# Patient Record
Sex: FEMALE | Race: WHITE | NOT HISPANIC OR LATINO | URBAN - METROPOLITAN AREA
[De-identification: names, ages, dates, MRNs, and addresses within clinical notes are randomized per-mention and may not be internally consistent; named-entity substitution may affect disease eponyms.]

---

## 2018-11-25 ENCOUNTER — INPATIENT (INPATIENT)
Facility: HOSPITAL | Age: 78
LOS: 8 days | Discharge: HOME | End: 2018-12-04
Attending: INTERNAL MEDICINE | Admitting: INTERNAL MEDICINE

## 2018-11-25 VITALS
SYSTOLIC BLOOD PRESSURE: 139 MMHG | TEMPERATURE: 97 F | RESPIRATION RATE: 18 BRPM | HEART RATE: 70 BPM | DIASTOLIC BLOOD PRESSURE: 63 MMHG | OXYGEN SATURATION: 88 %

## 2018-11-25 DIAGNOSIS — Z95.1 PRESENCE OF AORTOCORONARY BYPASS GRAFT: Chronic | ICD-10-CM

## 2018-11-25 DIAGNOSIS — Z98.890 OTHER SPECIFIED POSTPROCEDURAL STATES: Chronic | ICD-10-CM

## 2018-11-25 LAB
ALBUMIN SERPL ELPH-MCNC: 3.1 G/DL — LOW (ref 3.5–5.2)
ALP SERPL-CCNC: 178 U/L — HIGH (ref 30–115)
ALT FLD-CCNC: 35 U/L — SIGNIFICANT CHANGE UP (ref 0–41)
ANION GAP SERPL CALC-SCNC: 17 MMOL/L — HIGH (ref 7–14)
AST SERPL-CCNC: 39 U/L — SIGNIFICANT CHANGE UP (ref 0–41)
BASOPHILS # BLD AUTO: 0.02 K/UL — SIGNIFICANT CHANGE UP (ref 0–0.2)
BASOPHILS NFR BLD AUTO: 0.3 % — SIGNIFICANT CHANGE UP (ref 0–1)
BILIRUB SERPL-MCNC: 0.3 MG/DL — SIGNIFICANT CHANGE UP (ref 0.2–1.2)
BUN SERPL-MCNC: 80 MG/DL — CRITICAL HIGH (ref 10–20)
CALCIUM SERPL-MCNC: 9.5 MG/DL — SIGNIFICANT CHANGE UP (ref 8.5–10.1)
CHLORIDE SERPL-SCNC: 100 MMOL/L — SIGNIFICANT CHANGE UP (ref 98–110)
CO2 SERPL-SCNC: 24 MMOL/L — SIGNIFICANT CHANGE UP (ref 17–32)
CREAT SERPL-MCNC: 5.5 MG/DL — CRITICAL HIGH (ref 0.7–1.5)
EOSINOPHIL # BLD AUTO: 0.07 K/UL — SIGNIFICANT CHANGE UP (ref 0–0.7)
EOSINOPHIL NFR BLD AUTO: 0.9 % — SIGNIFICANT CHANGE UP (ref 0–8)
FLU A RESULT: NEGATIVE — SIGNIFICANT CHANGE UP
FLU A RESULT: NEGATIVE — SIGNIFICANT CHANGE UP
FLUAV AG NPH QL: NEGATIVE — SIGNIFICANT CHANGE UP
FLUBV AG NPH QL: NEGATIVE — SIGNIFICANT CHANGE UP
GAS PNL BLDA: SIGNIFICANT CHANGE UP
GAS PNL BLDA: SIGNIFICANT CHANGE UP
GAS PNL BLDV: SIGNIFICANT CHANGE UP
GLUCOSE BLDC GLUCOMTR-MCNC: 113 MG/DL — HIGH (ref 70–99)
GLUCOSE SERPL-MCNC: 93 MG/DL — SIGNIFICANT CHANGE UP (ref 70–99)
HCT VFR BLD CALC: 31.2 % — LOW (ref 37–47)
HGB BLD-MCNC: 9.3 G/DL — LOW (ref 12–16)
IMM GRANULOCYTES NFR BLD AUTO: 1.4 % — HIGH (ref 0.1–0.3)
LYMPHOCYTES # BLD AUTO: 0.58 K/UL — LOW (ref 1.2–3.4)
LYMPHOCYTES # BLD AUTO: 7.3 % — LOW (ref 20.5–51.1)
MCHC RBC-ENTMCNC: 26.5 PG — LOW (ref 27–31)
MCHC RBC-ENTMCNC: 29.8 G/DL — LOW (ref 32–37)
MCV RBC AUTO: 88.9 FL — SIGNIFICANT CHANGE UP (ref 81–99)
MONOCYTES # BLD AUTO: 0.91 K/UL — HIGH (ref 0.1–0.6)
MONOCYTES NFR BLD AUTO: 11.4 % — HIGH (ref 1.7–9.3)
NEUTROPHILS # BLD AUTO: 6.31 K/UL — SIGNIFICANT CHANGE UP (ref 1.4–6.5)
NEUTROPHILS NFR BLD AUTO: 78.7 % — HIGH (ref 42.2–75.2)
PLATELET # BLD AUTO: 120 K/UL — LOW (ref 130–400)
POTASSIUM SERPL-MCNC: 5 MMOL/L — SIGNIFICANT CHANGE UP (ref 3.5–5)
POTASSIUM SERPL-SCNC: 5 MMOL/L — SIGNIFICANT CHANGE UP (ref 3.5–5)
PROT SERPL-MCNC: 5.6 G/DL — LOW (ref 6–8)
RBC # BLD: 3.51 M/UL — LOW (ref 4.2–5.4)
RBC # FLD: 17.8 % — HIGH (ref 11.5–14.5)
RSV RESULT: NEGATIVE — SIGNIFICANT CHANGE UP
RSV RNA RESP QL NAA+PROBE: NEGATIVE — SIGNIFICANT CHANGE UP
SODIUM SERPL-SCNC: 141 MMOL/L — SIGNIFICANT CHANGE UP (ref 135–146)
WBC # BLD: 8 K/UL — SIGNIFICANT CHANGE UP (ref 4.8–10.8)
WBC # FLD AUTO: 8 K/UL — SIGNIFICANT CHANGE UP (ref 4.8–10.8)

## 2018-11-25 RX ORDER — LEVOTHYROXINE SODIUM 125 MCG
1 TABLET ORAL
Qty: 0 | Refills: 0 | COMMUNITY

## 2018-11-25 RX ORDER — CARVEDILOL PHOSPHATE 80 MG/1
1 CAPSULE, EXTENDED RELEASE ORAL
Qty: 0 | Refills: 0 | COMMUNITY

## 2018-11-25 RX ORDER — INSULIN GLARGINE 100 [IU]/ML
0 INJECTION, SOLUTION SUBCUTANEOUS
Qty: 0 | Refills: 0 | COMMUNITY

## 2018-11-25 RX ORDER — SERTRALINE 25 MG/1
50 TABLET, FILM COATED ORAL DAILY
Qty: 0 | Refills: 0 | Status: DISCONTINUED | OUTPATIENT
Start: 2018-11-25 | End: 2018-12-04

## 2018-11-25 RX ORDER — CALCITRIOL 0.5 UG/1
0.25 CAPSULE ORAL DAILY
Qty: 0 | Refills: 0 | Status: DISCONTINUED | OUTPATIENT
Start: 2018-11-25 | End: 2018-11-29

## 2018-11-25 RX ORDER — CARVEDILOL PHOSPHATE 80 MG/1
12.5 CAPSULE, EXTENDED RELEASE ORAL EVERY 12 HOURS
Qty: 0 | Refills: 0 | Status: DISCONTINUED | OUTPATIENT
Start: 2018-11-25 | End: 2018-12-04

## 2018-11-25 RX ORDER — INSULIN LISPRO 100/ML
5 VIAL (ML) SUBCUTANEOUS
Qty: 0 | Refills: 0 | Status: DISCONTINUED | OUTPATIENT
Start: 2018-11-25 | End: 2018-11-30

## 2018-11-25 RX ORDER — SERTRALINE 25 MG/1
1 TABLET, FILM COATED ORAL
Qty: 0 | Refills: 0 | COMMUNITY

## 2018-11-25 RX ORDER — CEFTRIAXONE 500 MG/1
1 INJECTION, POWDER, FOR SOLUTION INTRAMUSCULAR; INTRAVENOUS ONCE
Qty: 0 | Refills: 0 | Status: COMPLETED | OUTPATIENT
Start: 2018-11-25 | End: 2018-11-25

## 2018-11-25 RX ORDER — SITAGLIPTIN 50 MG/1
1 TABLET, FILM COATED ORAL
Qty: 0 | Refills: 0 | COMMUNITY

## 2018-11-25 RX ORDER — AZITHROMYCIN 500 MG/1
500 TABLET, FILM COATED ORAL EVERY 24 HOURS
Qty: 0 | Refills: 0 | Status: DISCONTINUED | OUTPATIENT
Start: 2018-11-25 | End: 2018-11-29

## 2018-11-25 RX ORDER — INSULIN GLARGINE 100 [IU]/ML
15 INJECTION, SOLUTION SUBCUTANEOUS AT BEDTIME
Qty: 0 | Refills: 0 | Status: DISCONTINUED | OUTPATIENT
Start: 2018-11-25 | End: 2018-11-30

## 2018-11-25 RX ORDER — CALCITRIOL 0.5 UG/1
1 CAPSULE ORAL
Qty: 0 | Refills: 0 | COMMUNITY

## 2018-11-25 RX ORDER — SODIUM CHLORIDE 9 MG/ML
1000 INJECTION, SOLUTION INTRAVENOUS ONCE
Qty: 0 | Refills: 0 | Status: COMPLETED | OUTPATIENT
Start: 2018-11-25 | End: 2018-11-25

## 2018-11-25 RX ORDER — DEXTROSE 50 % IN WATER 50 %
15 SYRINGE (ML) INTRAVENOUS ONCE
Qty: 0 | Refills: 0 | Status: DISCONTINUED | OUTPATIENT
Start: 2018-11-25 | End: 2018-12-04

## 2018-11-25 RX ORDER — GLUCAGON INJECTION, SOLUTION 0.5 MG/.1ML
1 INJECTION, SOLUTION SUBCUTANEOUS ONCE
Qty: 0 | Refills: 0 | Status: DISCONTINUED | OUTPATIENT
Start: 2018-11-25 | End: 2018-12-04

## 2018-11-25 RX ORDER — SODIUM CHLORIDE 9 MG/ML
1000 INJECTION INTRAMUSCULAR; INTRAVENOUS; SUBCUTANEOUS
Qty: 0 | Refills: 0 | Status: DISCONTINUED | OUTPATIENT
Start: 2018-11-25 | End: 2018-11-26

## 2018-11-25 RX ORDER — HEPARIN SODIUM 5000 [USP'U]/ML
5000 INJECTION INTRAVENOUS; SUBCUTANEOUS EVERY 8 HOURS
Qty: 0 | Refills: 0 | Status: DISCONTINUED | OUTPATIENT
Start: 2018-11-25 | End: 2018-12-04

## 2018-11-25 RX ORDER — CEFTRIAXONE 500 MG/1
1 INJECTION, POWDER, FOR SOLUTION INTRAMUSCULAR; INTRAVENOUS EVERY 24 HOURS
Qty: 0 | Refills: 0 | Status: DISCONTINUED | OUTPATIENT
Start: 2018-11-26 | End: 2018-11-27

## 2018-11-25 RX ORDER — DEXTROSE 50 % IN WATER 50 %
12.5 SYRINGE (ML) INTRAVENOUS ONCE
Qty: 0 | Refills: 0 | Status: DISCONTINUED | OUTPATIENT
Start: 2018-11-25 | End: 2018-12-04

## 2018-11-25 RX ORDER — ISOSORBIDE MONONITRATE 60 MG/1
1 TABLET, EXTENDED RELEASE ORAL
Qty: 0 | Refills: 0 | COMMUNITY

## 2018-11-25 RX ORDER — LEVOTHYROXINE SODIUM 125 MCG
50 TABLET ORAL DAILY
Qty: 0 | Refills: 0 | Status: DISCONTINUED | OUTPATIENT
Start: 2018-11-25 | End: 2018-12-04

## 2018-11-25 RX ORDER — PANTOPRAZOLE SODIUM 20 MG/1
40 TABLET, DELAYED RELEASE ORAL
Qty: 0 | Refills: 0 | Status: DISCONTINUED | OUTPATIENT
Start: 2018-11-25 | End: 2018-12-04

## 2018-11-25 RX ORDER — ISOSORBIDE MONONITRATE 60 MG/1
30 TABLET, EXTENDED RELEASE ORAL DAILY
Qty: 0 | Refills: 0 | Status: DISCONTINUED | OUTPATIENT
Start: 2018-11-25 | End: 2018-12-04

## 2018-11-25 RX ORDER — IPRATROPIUM/ALBUTEROL SULFATE 18-103MCG
3 AEROSOL WITH ADAPTER (GRAM) INHALATION
Qty: 0 | Refills: 0 | Status: COMPLETED | OUTPATIENT
Start: 2018-11-25 | End: 2018-11-25

## 2018-11-25 RX ORDER — INSULIN LISPRO 100/ML
VIAL (ML) SUBCUTANEOUS
Qty: 0 | Refills: 0 | Status: DISCONTINUED | OUTPATIENT
Start: 2018-11-25 | End: 2018-11-30

## 2018-11-25 RX ORDER — DEXTROSE 50 % IN WATER 50 %
25 SYRINGE (ML) INTRAVENOUS ONCE
Qty: 0 | Refills: 0 | Status: DISCONTINUED | OUTPATIENT
Start: 2018-11-25 | End: 2018-12-04

## 2018-11-25 RX ORDER — ASPIRIN/CALCIUM CARB/MAGNESIUM 324 MG
81 TABLET ORAL DAILY
Qty: 0 | Refills: 0 | Status: DISCONTINUED | OUTPATIENT
Start: 2018-11-25 | End: 2018-12-04

## 2018-11-25 RX ORDER — ASPIRIN/CALCIUM CARB/MAGNESIUM 324 MG
1 TABLET ORAL
Qty: 0 | Refills: 0 | COMMUNITY

## 2018-11-25 RX ORDER — BUDESONIDE AND FORMOTEROL FUMARATE DIHYDRATE 160; 4.5 UG/1; UG/1
2 AEROSOL RESPIRATORY (INHALATION)
Qty: 0 | Refills: 0 | COMMUNITY

## 2018-11-25 RX ORDER — FUROSEMIDE 40 MG
1 TABLET ORAL
Qty: 0 | Refills: 0 | COMMUNITY

## 2018-11-25 RX ORDER — SODIUM CHLORIDE 9 MG/ML
1000 INJECTION, SOLUTION INTRAVENOUS
Qty: 0 | Refills: 0 | Status: DISCONTINUED | OUTPATIENT
Start: 2018-11-25 | End: 2018-12-04

## 2018-11-25 RX ORDER — CEFTRIAXONE 500 MG/1
INJECTION, POWDER, FOR SOLUTION INTRAMUSCULAR; INTRAVENOUS
Qty: 0 | Refills: 0 | Status: DISCONTINUED | OUTPATIENT
Start: 2018-11-25 | End: 2018-11-27

## 2018-11-25 RX ADMIN — INSULIN GLARGINE 15 UNIT(S): 100 INJECTION, SOLUTION SUBCUTANEOUS at 23:34

## 2018-11-25 RX ADMIN — HEPARIN SODIUM 5000 UNIT(S): 5000 INJECTION INTRAVENOUS; SUBCUTANEOUS at 23:33

## 2018-11-25 RX ADMIN — CEFTRIAXONE 100 GRAM(S): 500 INJECTION, POWDER, FOR SOLUTION INTRAMUSCULAR; INTRAVENOUS at 16:00

## 2018-11-25 RX ADMIN — SODIUM CHLORIDE 50 MILLILITER(S): 9 INJECTION INTRAMUSCULAR; INTRAVENOUS; SUBCUTANEOUS at 22:28

## 2018-11-25 RX ADMIN — Medication 3 MILLILITER(S): at 16:02

## 2018-11-25 RX ADMIN — SODIUM CHLORIDE 1000 MILLILITER(S): 9 INJECTION, SOLUTION INTRAVENOUS at 16:02

## 2018-11-25 RX ADMIN — Medication 3 MILLILITER(S): at 16:00

## 2018-11-25 RX ADMIN — Medication 3 MILLILITER(S): at 16:01

## 2018-11-25 RX ADMIN — Medication 125 MILLIGRAM(S): at 16:02

## 2018-11-25 RX ADMIN — CEFTRIAXONE 100 GRAM(S): 500 INJECTION, POWDER, FOR SOLUTION INTRAMUSCULAR; INTRAVENOUS at 22:37

## 2018-11-25 RX ADMIN — CARVEDILOL PHOSPHATE 12.5 MILLIGRAM(S): 80 CAPSULE, EXTENDED RELEASE ORAL at 23:09

## 2018-11-25 NOTE — H&P ADULT - NSHPPHYSICALEXAM_GEN_ALL_CORE
Vital Signs Last 24 Hrs  T(C): 35.9 (25 Nov 2018 15:16), Max: 35.9 (25 Nov 2018 15:16)  T(F): 96.7 (25 Nov 2018 15:16), Max: 96.7 (25 Nov 2018 15:16)  HR: 63 (25 Nov 2018 16:16) (63 - 70)  BP: 139/63 (25 Nov 2018 15:16) (139/63 - 139/63)  BP(mean): --  RR: 18 (25 Nov 2018 15:16) (18 - 18)  SpO2: 99% (25 Nov 2018 16:16) (88% - 99%)    PHYSICAL EXAM:  GENERAL: AAOx3, no acute distress.  HEAD:  Atraumatic, Normocephalic  EYES: EOMI, PERRLA, conjunctiva and sclera clear  NECK: Supple, no JVD, cervical lymphadenopathy or thyromegaly  CHEST/LUNG: b/l diffuse rhonic and wheezing.  Pt on bipap.   HEART: Regular rate and rhythm; normal S1, S2, No murmurs, rubs, or gallops  ABDOMEN: Soft, nontender, nondistended; Bowel sounds present, no abdominal bruit, masses, or hepatosplenomegaly  EXTREMITIES: decreased pulses b/l - dorsalis pedis, tibial artery pulse.  Pale feet but warm to touch.    PSYCH:  Cooperative and appropriate  NEUROLOGY: No asterixis or tremor. Motor and sensory functions grossly intact  SKIN: Intact  Lymphatic system: No lymphadenopathy

## 2018-11-25 NOTE — H&P ADULT - ATTENDING COMMENTS
Agree with plan as above but may d/c rocephin. Patient still actively wheezing. Pt woken up for exam but deferred questioning to her granddaughter present at bedside. Granddaughter prefers her other family members be present when pt wakes up to discuss code status.     Labs, medications, images have been reviewed independently.

## 2018-11-25 NOTE — H&P ADULT - NSHPSOCIALHISTORY_GEN_ALL_CORE
Former smoker - quit 1 year ago when she was placed on home oxygen  -Pt smoked for 50 years  -Denies alcohol and illicit drug use.

## 2018-11-25 NOTE — ED PROVIDER NOTE - MEDICAL DECISION MAKING DETAILS
Patient to be admitted to an inpatient telemetry floor. Case discussed with and care endorsed to medical admitting resident. Admitting physician notified.

## 2018-11-25 NOTE — H&P ADULT - NSHPREVIEWOFSYSTEMS_GEN_ALL_CORE
CONSTITUTIONAL: No weakness, fevers or chills  EYES/ENT: No visual changes;  No vertigo or throat pain   NECK: No pain or stiffness  RESPIRATORY: SEE hpi  CARDIOVASCULAR: No chest pain or palpitations  GASTROINTESTINAL: No abdominal or epigastric pain. No nausea, vomiting, or hematemesis; No diarrhea or constipation. No melena or hematochezia.  GENITOURINARY: No dysuria, frequency or hematuria  NEUROLOGICAL: No numbness or weakness  SKIN: No itching, rashes

## 2018-11-25 NOTE — H&P ADULT - NSHPLABSRESULTS_GEN_ALL_CORE
9.3    8.00  )-----------( 120      ( 25 Nov 2018 16:00 )             31.2     11-25 @ 16:00  Auto Basophil #0.02 K/uL  Auto Basophil %0.3 %  Auto Eosinophil #0.07 K/uL  Auto Eosiniophil %0.9 %  Auto Immature Granulocyte %1.4 %<H>  Auto Lymphocyte #0.58 K/uL<L>  Auto Lymphocyte %7.3 %<L>  Auto Monocyte #0.91 K/uL<H>  Auto Monocyte %11.4 %<H>  Auto Neutrophil #6.31 K/uL  Auto Neutrophil %78.7 %<H>      141  |  100  |  80<HH>  ----------------------------<  93  11-25 @ 16:00  5.0   |  24  |  5.5<HH>        Ca    9.5     TPro  5.6<L>  /  Alb  3.1<L>  /  TBili  0.3  /  DBili  x   /  AST  39  /  ALT  35  /  AlkPhos  178<H>        CXR:  Right lower lobe infiltrate.  No PPM or AICD's.  Sternal clips visible.

## 2018-11-25 NOTE — ED PROVIDER NOTE - OBJECTIVE STATEMENT
78 yo female with PMHx COPD, HTN, HLD, DM, CKD presents for shortness of breath, congestion, productive cough x few days. Patient states she is home for the holidays and multiple sick contacts and congestion has been steadily getting worse. Patient is on Home O2 2L but states she is so congested she cannot breath from her nose. Patient feels very weak. Patient/family denies fevers, chest pain, abdominal pain, nausea, vomiting, diarrhea, leg pain/swelling, changes in PO intake, changes in urine output, changes in mental status.

## 2018-11-25 NOTE — H&P ADULT - HISTORY OF PRESENT ILLNESS
78 yo female with PMHx COPD on home oxygen, HTN, HLD, DM, history of bypass, CKD not on dialysis presents for shortness of breath, congestion, productive cough x 4 days. Patient states she is home for the holidays with multiple sick contacts at home and congestion has been steadily getting worse. Patient has 2L of home oxygen at home but states she is so congested she cannot breath from her nose. Patient feels tired and weak. Has a cough with productive sputum - greenish in color. Patient/family denies fevers, chest pain, abdominal pain, nausea, vomiting, diarrhea, leg pain/swelling, changes in PO intake, changes in urine output, changes in mental status.  Baseline, patient is able to walk but has to frequently stop due to severe PAD.  She reports her vascular surgeon says she's too high risk for surgery.      In ED, pt was found to be saturating at high 70's on 4L of NC and she was placed on bipap - saturating at 100%.

## 2018-11-25 NOTE — ED ADULT NURSE NOTE - NSIMPLEMENTINTERV_GEN_ALL_ED
Implemented All Fall with Harm Risk Interventions:  [place] to call system. Call bell, personal items and telephone within reach. Instruct patient to call for assistance. Room bathroom lighting operational. Non-slip footwear when patient is off stretcher. Physically safe environment: no spills, clutter or unnecessary equipment. Stretcher in lowest position, wheels locked, appropriate side rails in place. Provide visual cue, wrist band, yellow gown, etc. Monitor gait and stability. Monitor for mental status changes and reorient to person, place, and time. Review medications for side effects contributing to fall risk. Reinforce activity limits and safety measures with patient and family. Provide visual clues: red socks.

## 2018-11-25 NOTE — H&P ADULT - ASSESSMENT
78 yo female with PMHx COPD on home oxygen, HTN, HLD, DM, history of bypass, CKD not on dialysis presents for shortness of breath, congestion, productive cough x 4 days.    # COPD Exacerbation with bilateral lower lobe opacities  - pulmonary consult  - Rocephin 1 gram q 24, Azithromycin 500 q24  - nebulizers  - steroids 60 q 12  - flu and RVP panel sent by ED  - blood cultures    # HTN  -c/w home meds: carvedilol, enalapril, imdur    # DM  -monitor fingersticks  -ISS    # CAD s/p CABG  -c/w aspirin    # MARTIN on CKD  - Baseline creatinine unknown.  Please call Keysha Bruner in AM at [phone] to obtain baseline labs  - Nephro consult  - daily bmp's   - NS @ 50 cc/hr    #FENP:  Fluids: NS @ 50 cc/hr  Electrolytes: repeat potassium is 4.4  Nutrition: DASH/ TLC  Prophylaxis: Heparin and protonix  Activity: out of bed to chair + ambualte as tolerated    #Dispo: From home    #Code Status:  Pt and family want patient to be DNI but would like CPR performed.  It was explained that CPR stands for cardiopulmonary resuscitation and intubation is a part of CPR.  Family disagrees and re-iterate DNI with CPR.  MOLST form was brought to patient to sign DNI alone and family refused.  At this time, patient is full code. 76 yo female with PMHx COPD on home oxygen, HTN, HLD, DM, history of bypass, CKD not on dialysis presents for shortness of breath, congestion, productive cough x 4 days.    # COPD Exacerbation with bilateral lower lobe opacities  - pulmonary consult  - Rocephin 1 gram q 24, Azithromycin 500 q24  - nebulizers  - steroids 60 q 12  - flu and RVP panel sent by ED  - blood cultures    # HTN  -c/w home meds: carvedilol, imdur  -hold enalapril due to MARTIN    # DM  -monitor fingersticks  -ISS    # CAD s/p CABG  -c/w aspirin    # MARTIN on CKD  - Baseline creatinine unknown.  Please call Keysha Bruner in AM at [phone] to obtain baseline labs  - Nephro consult  - hold nephrotoxic agents  - daily bmp's   - NS @ 50 cc/hr    #FENP:  Fluids: NS @ 50 cc/hr  Electrolytes: repeat potassium is 4.4  Nutrition: DASH/ TLC  Prophylaxis: Heparin and protonix  Activity: out of bed to chair + ambulate as tolerated    #Dispo: From home    #Code Status:  Pt and family want patient to be DNI but would like CPR performed.  It was explained that CPR stands for cardiopulmonary resuscitation and intubation is a part of CPR.  Family disagrees and re-iterate DNI with CPR.  MOLST form was brought to patient to sign DNI alone and family refused.  At this time, patient is full code.

## 2018-11-25 NOTE — ED ADULT NURSE NOTE - OBJECTIVE STATEMENT
SOB and diff breathing for week, pt got sick with URI and breathing has progressively gottne worse, pt has copd on home o2 2lnc, hypoxic upon arrival. Pt placed on bipap, given neb tx , iv solumedrol and iv abx,

## 2018-11-25 NOTE — ED PROVIDER NOTE - PHYSICAL EXAMINATION
GEN: Tachypnic in respiratory distress. Appears tired.    HEAD:  Normocephalic, atraumatic.    EYES:  Clear conjunctivae without injection, drainage or discharge.    ENMT:  Dry MM.     NECK:  Supple, no masses. Normal ROM.    CARDIAC:  RRR, normal S1 and S2, no murmurs, rubs or gallops.    RESP:  Tachypnic to 30s in respiratory distress with retractions. B/L wheezing in all lung fields.    ABDOMEN:  Soft, non-tender, non-distended, no masses. Normal BS throughout.    MUSCULOSKELETAL: No leg swelling, no calf tenderness.   NEURO:  AAO x 3.     SKIN:  Normal skin color for age and race, well-perfused; warm and dry.

## 2018-11-25 NOTE — ED PROVIDER NOTE - ATTENDING CONTRIBUTION TO CARE
77 y F PMH COPD on 2L NC home O2, HTN, takes a water pill, HLD, DM, CKD, pw SOB and congestion worsening x 2 days. + greenish sputum. Feels chilled though family says this is baseline. No documented fevers, no chills, nausea, vomiting, chest pain, leg swelling, numbness, tingling, weakness. Low SpO2 on initial eval to high 70s.  Exam: NAD, Appears weak, appears stated age, NCAT, HEENT: mmm, EOMI, PERRLA, Neck: supple, nontender, nl ROM, Heart: RRR, no murmur, Lungs: tachypnea to 24, b/l diffuse wheezing, using accessory muscles for respiratory effort, no retractions or tripodding. no stridor, managing secretions, Abd: NTND, no guarding or rebound, no hernia palpated, no CVAT. MSK: chest, back, and ext nontender, nl rom, no deformity. No LE edema. normal 2+ b/l DP and radial pulses. Neuro: A&Ox3, normal strength, nl sensation throughout, speech normal though foreshortened sentences due to resp effort.   A/P: likely COPD exacerbation. Eval for PNA or other complication. BiPAP started on initial eval. Labs, imaging, symptom control, montior, reassess.

## 2018-11-25 NOTE — ED ADULT TRIAGE NOTE - CHIEF COMPLAINT QUOTE
pt complaining of cough congestion and shortness of breath. pt o2 dependant stating her nose is stuffed, difficulty breathing

## 2018-11-25 NOTE — ED PROVIDER NOTE - CARE PLAN
Principal Discharge DX:	Pneumonia  Secondary Diagnosis:	CO2 retention  Secondary Diagnosis:	COPD exacerbation  Secondary Diagnosis:	Shortness of breath  Secondary Diagnosis:	Hypoxia

## 2018-11-25 NOTE — H&P ADULT - PMH
Chronic kidney disease    COPD (chronic obstructive pulmonary disease)    Coronary artery disease    Diabetes mellitus    Dyslipidemia    Dyslipidemia    Hypertension

## 2018-11-26 LAB
ALBUMIN SERPL ELPH-MCNC: 2.7 G/DL — LOW (ref 3.5–5.2)
ALP SERPL-CCNC: 174 U/L — HIGH (ref 30–115)
ALT FLD-CCNC: 32 U/L — SIGNIFICANT CHANGE UP (ref 0–41)
ANION GAP SERPL CALC-SCNC: 17 MMOL/L — HIGH (ref 7–14)
ANION GAP SERPL CALC-SCNC: 17 MMOL/L — HIGH (ref 7–14)
AST SERPL-CCNC: 30 U/L — SIGNIFICANT CHANGE UP (ref 0–41)
BASOPHILS # BLD AUTO: 0.01 K/UL — SIGNIFICANT CHANGE UP (ref 0–0.2)
BASOPHILS NFR BLD AUTO: 0.2 % — SIGNIFICANT CHANGE UP (ref 0–1)
BILIRUB DIRECT SERPL-MCNC: <0.2 MG/DL — SIGNIFICANT CHANGE UP (ref 0–0.2)
BILIRUB INDIRECT FLD-MCNC: >0 MG/DL — LOW (ref 0.2–1.2)
BILIRUB SERPL-MCNC: 0.2 MG/DL — SIGNIFICANT CHANGE UP (ref 0.2–1.2)
BUN SERPL-MCNC: 78 MG/DL — CRITICAL HIGH (ref 10–20)
BUN SERPL-MCNC: 81 MG/DL — CRITICAL HIGH (ref 10–20)
CALCIUM SERPL-MCNC: 8.8 MG/DL — SIGNIFICANT CHANGE UP (ref 8.5–10.1)
CALCIUM SERPL-MCNC: 8.9 MG/DL — SIGNIFICANT CHANGE UP (ref 8.5–10.1)
CHLORIDE SERPL-SCNC: 102 MMOL/L — SIGNIFICANT CHANGE UP (ref 98–110)
CHLORIDE SERPL-SCNC: 102 MMOL/L — SIGNIFICANT CHANGE UP (ref 98–110)
CO2 SERPL-SCNC: 20 MMOL/L — SIGNIFICANT CHANGE UP (ref 17–32)
CO2 SERPL-SCNC: 22 MMOL/L — SIGNIFICANT CHANGE UP (ref 17–32)
CREAT SERPL-MCNC: 4.9 MG/DL — CRITICAL HIGH (ref 0.7–1.5)
CREAT SERPL-MCNC: 5.3 MG/DL — CRITICAL HIGH (ref 0.7–1.5)
EOSINOPHIL # BLD AUTO: 0 K/UL — SIGNIFICANT CHANGE UP (ref 0–0.7)
EOSINOPHIL NFR BLD AUTO: 0 % — SIGNIFICANT CHANGE UP (ref 0–8)
ESTIMATED AVERAGE GLUCOSE: 120 MG/DL — HIGH (ref 68–114)
GLUCOSE BLDC GLUCOMTR-MCNC: 200 MG/DL — HIGH (ref 70–99)
GLUCOSE BLDC GLUCOMTR-MCNC: 236 MG/DL — HIGH (ref 70–99)
GLUCOSE BLDC GLUCOMTR-MCNC: 241 MG/DL — HIGH (ref 70–99)
GLUCOSE BLDC GLUCOMTR-MCNC: 244 MG/DL — HIGH (ref 70–99)
GLUCOSE BLDC GLUCOMTR-MCNC: 273 MG/DL — HIGH (ref 70–99)
GLUCOSE SERPL-MCNC: 153 MG/DL — HIGH (ref 70–99)
GLUCOSE SERPL-MCNC: 204 MG/DL — HIGH (ref 70–99)
HBA1C BLD-MCNC: 5.8 % — HIGH (ref 4–5.6)
HCT VFR BLD CALC: 29.5 % — LOW (ref 37–47)
HGB BLD-MCNC: 8.8 G/DL — LOW (ref 12–16)
IMM GRANULOCYTES NFR BLD AUTO: 2 % — HIGH (ref 0.1–0.3)
LYMPHOCYTES # BLD AUTO: 0.28 K/UL — LOW (ref 1.2–3.4)
LYMPHOCYTES # BLD AUTO: 5.2 % — LOW (ref 20.5–51.1)
MCHC RBC-ENTMCNC: 26.7 PG — LOW (ref 27–31)
MCHC RBC-ENTMCNC: 29.8 G/DL — LOW (ref 32–37)
MCV RBC AUTO: 89.4 FL — SIGNIFICANT CHANGE UP (ref 81–99)
MONOCYTES # BLD AUTO: 0.11 K/UL — SIGNIFICANT CHANGE UP (ref 0.1–0.6)
MONOCYTES NFR BLD AUTO: 2 % — SIGNIFICANT CHANGE UP (ref 1.7–9.3)
NEUTROPHILS # BLD AUTO: 4.87 K/UL — SIGNIFICANT CHANGE UP (ref 1.4–6.5)
NEUTROPHILS NFR BLD AUTO: 90.6 % — HIGH (ref 42.2–75.2)
NRBC # BLD: 0 /100 WBCS — SIGNIFICANT CHANGE UP (ref 0–0)
NT-PROBNP SERPL-SCNC: HIGH PG/ML (ref 0–300)
PLATELET # BLD AUTO: 122 K/UL — LOW (ref 130–400)
POTASSIUM SERPL-MCNC: 4.9 MMOL/L — SIGNIFICANT CHANGE UP (ref 3.5–5)
POTASSIUM SERPL-MCNC: 4.9 MMOL/L — SIGNIFICANT CHANGE UP (ref 3.5–5)
POTASSIUM SERPL-SCNC: 4.9 MMOL/L — SIGNIFICANT CHANGE UP (ref 3.5–5)
POTASSIUM SERPL-SCNC: 4.9 MMOL/L — SIGNIFICANT CHANGE UP (ref 3.5–5)
PROT SERPL-MCNC: 5.5 G/DL — LOW (ref 6–8)
RBC # BLD: 3.3 M/UL — LOW (ref 4.2–5.4)
RBC # FLD: 17.6 % — HIGH (ref 11.5–14.5)
SODIUM SERPL-SCNC: 139 MMOL/L — SIGNIFICANT CHANGE UP (ref 135–146)
SODIUM SERPL-SCNC: 141 MMOL/L — SIGNIFICANT CHANGE UP (ref 135–146)
WBC # BLD: 5.38 K/UL — SIGNIFICANT CHANGE UP (ref 4.8–10.8)
WBC # FLD AUTO: 5.38 K/UL — SIGNIFICANT CHANGE UP (ref 4.8–10.8)

## 2018-11-26 RX ORDER — ACETAMINOPHEN 500 MG
650 TABLET ORAL ONCE
Qty: 0 | Refills: 0 | Status: COMPLETED | OUTPATIENT
Start: 2018-11-26 | End: 2018-11-26

## 2018-11-26 RX ORDER — SODIUM CHLORIDE 9 MG/ML
1000 INJECTION INTRAMUSCULAR; INTRAVENOUS; SUBCUTANEOUS
Qty: 0 | Refills: 0 | Status: DISCONTINUED | OUTPATIENT
Start: 2018-11-26 | End: 2018-11-28

## 2018-11-26 RX ORDER — SODIUM CHLORIDE 9 MG/ML
500 INJECTION INTRAMUSCULAR; INTRAVENOUS; SUBCUTANEOUS ONCE
Qty: 0 | Refills: 0 | Status: COMPLETED | OUTPATIENT
Start: 2018-11-26 | End: 2018-11-26

## 2018-11-26 RX ORDER — ACETAMINOPHEN 500 MG
650 TABLET ORAL EVERY 6 HOURS
Qty: 0 | Refills: 0 | Status: DISCONTINUED | OUTPATIENT
Start: 2018-11-26 | End: 2018-12-04

## 2018-11-26 RX ORDER — ONDANSETRON 8 MG/1
4 TABLET, FILM COATED ORAL EVERY 6 HOURS
Qty: 0 | Refills: 0 | Status: DISCONTINUED | OUTPATIENT
Start: 2018-11-26 | End: 2018-12-04

## 2018-11-26 RX ORDER — SODIUM CHLORIDE 9 MG/ML
1000 INJECTION INTRAMUSCULAR; INTRAVENOUS; SUBCUTANEOUS
Qty: 0 | Refills: 0 | Status: DISCONTINUED | OUTPATIENT
Start: 2018-11-26 | End: 2018-11-26

## 2018-11-26 RX ADMIN — SODIUM CHLORIDE 50 MILLILITER(S): 9 INJECTION INTRAMUSCULAR; INTRAVENOUS; SUBCUTANEOUS at 13:50

## 2018-11-26 RX ADMIN — HEPARIN SODIUM 5000 UNIT(S): 5000 INJECTION INTRAVENOUS; SUBCUTANEOUS at 13:10

## 2018-11-26 RX ADMIN — PANTOPRAZOLE SODIUM 40 MILLIGRAM(S): 20 TABLET, DELAYED RELEASE ORAL at 08:14

## 2018-11-26 RX ADMIN — Medication 5 UNIT(S): at 08:14

## 2018-11-26 RX ADMIN — Medication 60 MILLIGRAM(S): at 17:16

## 2018-11-26 RX ADMIN — Medication 5 UNIT(S): at 13:21

## 2018-11-26 RX ADMIN — Medication 2: at 18:14

## 2018-11-26 RX ADMIN — CEFTRIAXONE 100 GRAM(S): 500 INJECTION, POWDER, FOR SOLUTION INTRAMUSCULAR; INTRAVENOUS at 22:22

## 2018-11-26 RX ADMIN — SERTRALINE 50 MILLIGRAM(S): 25 TABLET, FILM COATED ORAL at 11:15

## 2018-11-26 RX ADMIN — SODIUM CHLORIDE 50 MILLILITER(S): 9 INJECTION INTRAMUSCULAR; INTRAVENOUS; SUBCUTANEOUS at 00:34

## 2018-11-26 RX ADMIN — Medication 5 UNIT(S): at 18:15

## 2018-11-26 RX ADMIN — INSULIN GLARGINE 15 UNIT(S): 100 INJECTION, SOLUTION SUBCUTANEOUS at 23:02

## 2018-11-26 RX ADMIN — SODIUM CHLORIDE 500 MILLILITER(S): 9 INJECTION INTRAMUSCULAR; INTRAVENOUS; SUBCUTANEOUS at 12:01

## 2018-11-26 RX ADMIN — Medication 650 MILLIGRAM(S): at 01:02

## 2018-11-26 RX ADMIN — Medication 3: at 13:25

## 2018-11-26 RX ADMIN — Medication 60 MILLIGRAM(S): at 05:54

## 2018-11-26 RX ADMIN — SODIUM CHLORIDE 50 MILLILITER(S): 9 INJECTION INTRAMUSCULAR; INTRAVENOUS; SUBCUTANEOUS at 22:57

## 2018-11-26 RX ADMIN — Medication 81 MILLIGRAM(S): at 11:16

## 2018-11-26 RX ADMIN — ISOSORBIDE MONONITRATE 30 MILLIGRAM(S): 60 TABLET, EXTENDED RELEASE ORAL at 11:15

## 2018-11-26 RX ADMIN — CALCITRIOL 0.25 MICROGRAM(S): 0.5 CAPSULE ORAL at 11:16

## 2018-11-26 RX ADMIN — Medication 1: at 08:15

## 2018-11-26 RX ADMIN — Medication 50 MICROGRAM(S): at 05:54

## 2018-11-26 RX ADMIN — CARVEDILOL PHOSPHATE 12.5 MILLIGRAM(S): 80 CAPSULE, EXTENDED RELEASE ORAL at 17:16

## 2018-11-26 RX ADMIN — HEPARIN SODIUM 5000 UNIT(S): 5000 INJECTION INTRAVENOUS; SUBCUTANEOUS at 05:54

## 2018-11-26 RX ADMIN — AZITHROMYCIN 255 MILLIGRAM(S): 500 TABLET, FILM COATED ORAL at 00:34

## 2018-11-26 RX ADMIN — CARVEDILOL PHOSPHATE 12.5 MILLIGRAM(S): 80 CAPSULE, EXTENDED RELEASE ORAL at 05:58

## 2018-11-26 RX ADMIN — HEPARIN SODIUM 5000 UNIT(S): 5000 INJECTION INTRAVENOUS; SUBCUTANEOUS at 23:02

## 2018-11-26 RX ADMIN — Medication 650 MILLIGRAM(S): at 02:02

## 2018-11-26 NOTE — CONSULT NOTE ADULT - ASSESSMENT
IMPRESSION:  Acute COPD exacerbation - likely secondary to viral etiology    PLAN:    CNS:    HEENT: Oral care    PULMONARY:  HOB @ 45 degrees.  Nebs q4 and PRN  Continue solumedrol  Keep SpO2>92%  BiPAP at bedtime    CARDIOVASCULAR: I<O    GI: GI prophylaxis.  Feeding     RENAL:  Follow up lytes.  Correct as needed    INFECTIOUS DISEASE: Follow up cultures.  Continue IV abx  Check RVP    HEMATOLOGICAL:  DVT prophylaxis.    ENDOCRINE:  Follow up FS.  Insulin protocol if needed. IMPRESSION:    Acute COPD exacerbation - likely secondary to viral etiology  multiple co morbidities    PLAN:    CNS: avoid CNS depressant    HEENT: Oral care    PULMONARY:  HOB @ 45 degrees.  Nebs q4 and PRN  Continue solumedrol  Keep SpO2>92%  BiPAP at bedtime    CARDIOVASCULAR: I<O    GI: GI prophylaxis.  Feeding     RENAL:  Follow up lytes.  Correct as needed    INFECTIOUS DISEASE: Follow up cultures.  Continue IV abx  Check RVP    HEMATOLOGICAL:  DVT prophylaxis.    ENDOCRINE:  Follow up FS.  Insulin protocol if needed.    no need for MICU for now will follow

## 2018-11-26 NOTE — PROGRESS NOTE ADULT - ASSESSMENT
76 yo female with PMHx COPD on home oxygen, HTN, HLD, DM, history of bypass, CKD not on dialysis presents for shortness of breath, congestion, productive cough x 4 days.    COPD Exacerbation with bilateral lower lobe opacities  - Resume Solumedrol, currently 99% on 2L NC while sleeping, can be off NC  - s/p Rocephin 1 gram q 24, Azithromycin 500 q24. Resume  - nebulizers  - steroids 60 q 12, resume  - flu and RVP panel negative  - blood cultures    HTN  -c/w home meds: carvedilol, imdur  -hold enalapril due to MARTIN    DM  -monitor fingersticks  -ISS    CAD s/p CABG; c/w aspirin    MARTIN on CKD  - Nephro consult  - hold nephrotoxic agents  - daily bmp's   - NS increased to 75 cc/hr. Bolus 500cc now.  - F/u BMP in AM    TTE pending for baseline  Nutrition: DASH/ TLC  Prophylaxis: Heparin and protonix  Activity: out of bed to chair + ambulate as tolerated    Dispo: From home    Code Status:  Pt and family want patient to be DNI but would like CPR performed.  It was explained that CPR stands for cardiopulmonary resuscitation and intubation is a part of CPR.  Family disagrees and re-iterate DNI with CPR.  MOLST form was brought to patient to sign DNI alone and family refused.  At this time, patient is full code. 78 yo female with PMHx COPD on home oxygen, HTN, HLD, DM, history of bypass, CKD not on dialysis presents for shortness of breath, congestion, productive cough x 4 days.    COPD Exacerbation with bilateral lower lobe opacities  - Resume Solumedrol, currently 99% on 2L NC while sleeping, can be off NC  - s/p Rocephin 1 gram q 24, Azithromycin 500 q24. Resume  - nebulizers  - steroids 60 q 12, resume  - flu and RVP panel negative  - blood cultures    HTN  -c/w home meds: carvedilol, imdur  -hold enalapril due to MARTIN    DM  -monitor fingersticks  -ISS    CAD s/p CABG; c/w aspirin    MARTIN on CKD  - Nephro consult  - hold nephrotoxic agents  - daily bmp's   - NS at 50 cc/hr. Bolus 500cc now.  - F/u BMP in AM    TTE pending for baseline  Nutrition: DASH/ TLC  Prophylaxis: Heparin and protonix  Activity: out of bed to chair + ambulate as tolerated    Dispo: From home    Code Status:  Pt and family want patient to be DNI but would like CPR performed.  It was explained that CPR stands for cardiopulmonary resuscitation and intubation is a part of CPR.  Family disagrees and re-iterate DNI with CPR.  MOLST form was brought to patient to sign DNI alone and family refused.  At this time, patient is full code.

## 2018-11-26 NOTE — CONSULT NOTE ADULT - SUBJECTIVE AND OBJECTIVE BOX
NEPHROLOGY CONSULTATION NOTE    Patient is a 77y Female pmh HTN, DM, CKD (baseline cr. not available), COPD on home oxygen, CAD s/p CABG, DLD, PAD presented to the hospital with SOB, congestion and productive cough with green sputum for 2 days. As per daughter, pt has decreased oral intake for couple of days, though she usually eats very little routinely. Daughter also reports that her father/patient's , is suffering from Respiratory infection and is on ABx. Patient denies any n/v/f, abdominal pain, diarrhea, weakness, numbness.    PAST MEDICAL & SURGICAL HISTORY:  Dyslipidemia  Coronary artery disease  Diabetes mellitus  COPD (chronic obstructive pulmonary disease)  Dyslipidemia  Chronic kidney disease  Hypertension  H/O hernia repair  S/P CABG (coronary artery bypass graft)    Allergies:  No Known Allergies    Home Medications:  Aspirin Enteric Coated 81 mg oral delayed release tablet: 1 tab(s) orally once a day (25 Nov 2018 20:26)  budesonide-formoterol 160 mcg-4.5 mcg/inh inhalation aerosol: 2 puff(s) inhaled 2 times a day (25 Nov 2018 20:26)  calcitriol 0.25 mcg oral capsule: 1 cap(s) orally once a day (25 Nov 2018 20:26)  carvedilol 12.5 mg oral tablet: 1 tab(s) orally 2 times a day (25 Nov 2018 20:26)  enalapril 10 mg oral tablet:  (25 Nov 2018 20:26)  furosemide 20 mg oral tablet: 1 tab(s) orally once a day (25 Nov 2018 20:26)  glipiZIDE 5 mg oral tablet:  (25 Nov 2018 20:26)  insulin glargine 100 units/mL subcutaneous solution:  (25 Nov 2018 20:26)  isosorbide mononitrate 30 mg oral tablet, extended release: 1 tab(s) orally once a day (in the morning) (25 Nov 2018 20:26)  levothyroxine 50 mcg (0.05 mg) oral tablet: 1 tab(s) orally once a day (25 Nov 2018 20:26)  sertraline 50 mg oral tablet: 1 tab(s) orally once a day (25 Nov 2018 20:26)  SITagliptin 25 mg oral tablet: 1 tab(s) orally once a day (25 Nov 2018 20:26)    Hospital Medications:   MEDICATIONS  (STANDING):  aspirin enteric coated 81 milliGRAM(s) Oral daily  azithromycin  IVPB 500 milliGRAM(s) IV Intermittent every 24 hours  calcitriol   Capsule 0.25 MICROGram(s) Oral daily  carvedilol 12.5 milliGRAM(s) Oral every 12 hours  cefTRIAXone   IVPB      cefTRIAXone   IVPB 1 Gram(s) IV Intermittent every 24 hours  dextrose 5%. 1000 milliLiter(s) (50 mL/Hr) IV Continuous <Continuous>  heparin  Injectable 5000 Unit(s) SubCutaneous every 8 hours  insulin glargine Injectable (LANTUS) 15 Unit(s) SubCutaneous at bedtime  insulin lispro (HumaLOG) corrective regimen sliding scale   SubCutaneous three times a day before meals  insulin lispro Injectable (HumaLOG) 5 Unit(s) SubCutaneous three times a day before meals  isosorbide   mononitrate ER Tablet (IMDUR) 30 milliGRAM(s) Oral daily  levothyroxine 50 MICROGram(s) Oral daily  methylPREDNISolone sodium succinate Injectable 60 milliGRAM(s) IV Push every 12 hours  pantoprazole    Tablet 40 milliGRAM(s) Oral before breakfast  sertraline 50 milliGRAM(s) Oral daily  sodium chloride 0.9%. 1000 milliLiter(s) (50 mL/Hr) IV Continuous <Continuous>      SOCIAL HISTORY:  Denies ETOH,Smoking,   FAMILY HISTORY:        REVIEW OF SYSTEMS:    All other review of systems is negative unless indicated above.    VITALS:  T(F): 98.3 (11-26-18 @ 08:23), Max: 98.3 (11-26-18 @ 08:23)  HR: 74 (11-26-18 @ 11:53)  BP: 150/68 (11-26-18 @ 11:53)  RR: 22 (11-26-18 @ 08:23)  SpO2: 100% (11-26-18 @ 11:53)        I&O's Detail        PHYSICAL EXAM:  Constitutional: NAD  Respiratory: CTAB, no wheezes, rales or rhonchi  Cardiovascular: S1, S2, RRR  Gastrointestinal: BS+, soft, NT/ND  Extremities: No peripheral edema  Neurological: A/O x 3  : No atm.     Vascular Access:    LABS:  11-26    139  |  102  |  81<HH>  ----------------------------<  204<H>  4.9   |  20  |  5.3<HH>    Ca    8.8      26 Nov 2018 09:30    TPro  5.5<L>  /  Alb  2.7<L>  /  TBili  0.2  /  DBili  <0.2  /  AST  30  /  ALT  32  /  AlkPhos  174<H>  11-26    Creatinine Trend: 5.3 <--, 4.9 <--, 5.5 <--                        8.8    5.38  )-----------( 122      ( 26 Nov 2018 09:30 )             29.5     Blood Gas Profile - Arterial (11.25.18 @ 23:16)    pH, Arterial: 7.24    pCO2, Arterial: 55 mmHg    pO2, Arterial: 90 mmHg    HCO3, Arterial: 24 mmoL/L    Base Excess, Arterial: -4.1 mmoL/L    Oxygen Saturation, Arterial: 97 %    Blood Gas Arterial, Lactate: 0.6 mmoL/L (11.25.18 @ 23:16)    Urine Studies:              RADIOLOGY & ADDITIONAL STUDIES:  < from: US Kidney and Bladder (11.26.18 @ 09:40) >  Impression:    No hydronephrosis or stone in either kidney.    Bilateral renal simple cysts.    < end of copied text >    < from: Xray Chest 1 View-PORTABLE IMMEDIATE (11.25.18 @ 18:29) >  Impression:      Pulmonary venous hypertension, mild. Follow-up as needed.    < end of copied text >

## 2018-11-26 NOTE — CONSULT NOTE ADULT - ASSESSMENT
Patient with MARTIN on CKD presented to hospital for SOB, congestion and productive cough.  PMH HTN, DM, CKD (baseline cr. not available), COPD on home oxygen, CAD s/p CABG, DLD, PAD.    # MARTIN on CKD Plus acidosis   - serum cr. trending between 5.5 and 4.9   - can be Prerenal MARTIN on CKD due to dehydration/ decreased intake vs CKD stable/progression    - ABG noted for Respiratory acidosis plus HAGMA. serum lactate WNL.   - No need for RRT at moment   - will contact her primary nephrologist in [facility] to inquire about her baseline Cr.   - cont. IVF with NS, hold lasix for now.   - Monitor I & O   - US KUB noted, no hydro   - Repeat BMP, check IP, Mg, check ECHO   - Repeat VBG    # HTN   - BP noted, above goal   - start home meds    # Anemia   - Hb noted   - no need for KYMBERLY at the moment   - check Fe studies   - Monitor h/h, Tx if hb < 7.    # PNA / Sepsis   - cont. ABx   - consider ID consult    Will follow Patient with MARTIN on CKD presented to hospital for SOB, congestion and productive cough.  PMH HTN, DM, CKD (baseline cr. not available), COPD on home oxygen, CAD s/p CABG, DLD, PAD.     MARTIN on CKD Plus acidosis   - serum cr. trending between 5.5 and 4.9   - can be Prerenal MARTIN on CKD due to dehydration/ decreased intake vs CKD stable/progression    - ABG noted for Respiratory acidosis plus HAGMA. serum lactate WNL.   - No need for RRT at moment   - will contact her primary nephrologist in [facility] to inquire about her baseline Cr.   - cont. IVF with NS, hold lasix for now.   - Monitor I & O   - US KUB noted, no hydro   - Repeat BMP, check IP, Mg, check ECHO   - Repeat VBG     HTN   - BP noted, above goal   - start home meds     Anemia   - Hb noted   - no need for KYMBERLY at the moment   - check Fe studies   - Monitor h/h, Tx if hb < 7.     PNA / Sepsis   - cont. ABx   -    Will follow

## 2018-11-26 NOTE — CONSULT NOTE ADULT - SUBJECTIVE AND OBJECTIVE BOX
Patient is a 77y old  Female who presents with a chief complaint of difficulty breathing (25 Nov 2018 20:21)      HPI:  76 yo female with PMHx COPD on home oxygen, HTN, HLD, DM, history of bypass, CKD not on dialysis presents for shortness of breath, congestion, productive cough x 4 days. Patient states she is home for the holidays with multiple sick contacts at home and congestion has been steadily getting worse. Patient has 2L of home oxygen at home but states she is so congested she cannot breath from her nose. Patient feels tired and weak. Has a cough with productive sputum - greenish in color. Patient/family denies fevers, chest pain, abdominal pain, nausea, vomiting, diarrhea, leg pain/swelling, changes in PO intake, changes in urine output, changes in mental status.  Baseline, patient is able to walk but has to frequently stop due to severe PAD.  She reports her vascular surgeon says she's too high risk for surgery.      In ED, pt was found to be saturating at high 70's on 4L of NC and she was placed on bipap - saturating at 100%. (25 Nov 2018 20:21)      PAST MEDICAL & SURGICAL HISTORY:  Dyslipidemia  Coronary artery disease  Diabetes mellitus  COPD (chronic obstructive pulmonary disease)  Dyslipidemia  Chronic kidney disease  Hypertension  H/O hernia repair  S/P CABG (coronary artery bypass graft)      FAMILY HISTORY:  :  No known cardiovacular family hisotry     ROS:  See HPI     Allergies    No Known Allergies      PHYSICAL EXAM    ICU Vital Signs Last 24 Hrs  T(C): 35.9 (25 Nov 2018 15:16), Max: 35.9 (25 Nov 2018 15:16)  T(F): 96.7 (25 Nov 2018 15:16), Max: 96.7 (25 Nov 2018 15:16)  HR: 71 (26 Nov 2018 04:00) (63 - 71)  BP: 166/72 (25 Nov 2018 23:04) (139/63 - 166/72)  RR: 18 (25 Nov 2018 15:16) (18 - 18)  SpO2: 99% on 3L (26 Nov 2018 04:00) (88% - 99%)      General: In NAD   HEENT:  CHIKI              Lymphatic system: No cervical LN   Lungs: Bilateral wheezing  Cardiovascular: Regular  Gastrointestinal: Soft, Positive BS  Musculoskeletal: No clubbing.  Moves all extremities.  Full range of motion   Skin: Warm.  Intact  Neurological: No motor or sensory deficit         LABS:                          9.3    8.00  )-----------( 120      ( 25 Nov 2018 16:00 )             31.2                                               11-26    141  |  102  |  78<HH>  ----------------------------<  153<H>  4.9   |  22  |  4.9<HH>    Ca    8.9      26 Nov 2018 01:00    TPro  5.6<L>  /  Alb  3.1<L>  /  TBili  0.3  /  DBili  x   /  AST  39  /  ALT  35  /  AlkPhos  178<H>  11-25                                                                                           LIVER FUNCTIONS - ( 25 Nov 2018 16:00 )  Alb: 3.1 g/dL / Pro: 5.6 g/dL / ALK PHOS: 178 U/L / ALT: 35 U/L / AST: 39 U/L / GGT: x                                                                                                                                   ABG - ( 25 Nov 2018 23:16 )  pH, Arterial: 7.24  pH, Blood: x     /  pCO2: 55    /  pO2: 90    / HCO3: 24    / Base Excess: -4.1  /  SaO2: 97              MEDICATIONS  (STANDING):  aspirin enteric coated 81 milliGRAM(s) Oral daily  azithromycin  IVPB 500 milliGRAM(s) IV Intermittent every 24 hours  calcitriol   Capsule 0.25 MICROGram(s) Oral daily  carvedilol 12.5 milliGRAM(s) Oral every 12 hours  cefTRIAXone   IVPB      cefTRIAXone   IVPB 1 Gram(s) IV Intermittent every 24 hours  dextrose 5%. 1000 milliLiter(s) (50 mL/Hr) IV Continuous <Continuous>  dextrose 50% Injectable 12.5 Gram(s) IV Push once  dextrose 50% Injectable 25 Gram(s) IV Push once  dextrose 50% Injectable 25 Gram(s) IV Push once  heparin  Injectable 5000 Unit(s) SubCutaneous every 8 hours  insulin glargine Injectable (LANTUS) 15 Unit(s) SubCutaneous at bedtime  insulin lispro (HumaLOG) corrective regimen sliding scale   SubCutaneous three times a day before meals  insulin lispro Injectable (HumaLOG) 5 Unit(s) SubCutaneous three times a day before meals  isosorbide   mononitrate ER Tablet (IMDUR) 30 milliGRAM(s) Oral daily  levothyroxine 50 MICROGram(s) Oral daily  methylPREDNISolone sodium succinate Injectable 60 milliGRAM(s) IV Push every 12 hours  pantoprazole    Tablet 40 milliGRAM(s) Oral before breakfast  sertraline 50 milliGRAM(s) Oral daily  sodium chloride 0.9%. 1000 milliLiter(s) (50 mL/Hr) IV Continuous <Continuous>    MEDICATIONS  (PRN):  dextrose 40% Gel 15 Gram(s) Oral once PRN Blood Glucose LESS THAN 70 milliGRAM(s)/deciliter  glucagon  Injectable 1 milliGRAM(s) IntraMuscular once PRN Glucose LESS THAN 70 milligrams/deciliter Patient is a 77y old  Female who presents with a chief complaint of difficulty breathing (25 Nov 2018 20:21)      HPI:  78 yo female with PMHx COPD on home oxygen, HTN, HLD, DM, history of bypass, CKD not on dialysis presents for shortness of breath, congestion, productive cough x 4 days. Patient states she is home for the holidays with multiple sick contacts at home and congestion has been steadily getting worse. Patient has 2L of home oxygen at home but states she is so congested she cannot breath from her nose. Patient feels tired and weak. Has a cough with productive sputum - greenish in color. Patient/family denies fevers, chest pain, abdominal pain, nausea, vomiting, diarrhea, leg pain/swelling, changes in PO intake, changes in urine output, changes in mental status.  Baseline, patient is able to walk but has to frequently stop due to severe PAD.  She reports her vascular surgeon says she's too high risk for surgery.      In ED, pt was found to be saturating at high 70's on 4L of NC and she was placed on bipap - saturating at 100%. (25 Nov 2018 20:21) today feels much better      PAST MEDICAL & SURGICAL HISTORY:  Dyslipidemia  Coronary artery disease  Diabetes mellitus  COPD (chronic obstructive pulmonary disease)  Dyslipidemia  Chronic kidney disease  Hypertension  H/O hernia repair  S/P CABG (coronary artery bypass graft)      FAMILY HISTORY:  :  No known cardiovacular family hisotry     ROS:  See HPI     Allergies    No Known Allergies      PHYSICAL EXAM    ICU Vital Signs Last 24 Hrs  T(C): 35.9 (25 Nov 2018 15:16), Max: 35.9 (25 Nov 2018 15:16)  T(F): 96.7 (25 Nov 2018 15:16), Max: 96.7 (25 Nov 2018 15:16)  HR: 71 (26 Nov 2018 04:00) (63 - 71)  BP: 166/72 (25 Nov 2018 23:04) (139/63 - 166/72)  RR: 18 (25 Nov 2018 15:16) (18 - 18)  SpO2: 99% on 3L (26 Nov 2018 04:00) (88% - 99%)      General: In NAD   HEENT:  CHIKI              Lymphatic system: No cervical LN   Lungs: Bilateral wheezing  Cardiovascular: Regular  Gastrointestinal: Soft, Positive BS  Musculoskeletal: No clubbing.  Moves all extremities.  Full range of motion   Skin: Warm.  Intact  Neurological: No motor or sensory deficit         LABS:                          9.3    8.00  )-----------( 120      ( 25 Nov 2018 16:00 )             31.2                                               11-26    141  |  102  |  78<HH>  ----------------------------<  153<H>  4.9   |  22  |  4.9<HH>    Ca    8.9      26 Nov 2018 01:00    TPro  5.6<L>  /  Alb  3.1<L>  /  TBili  0.3  /  DBili  x   /  AST  39  /  ALT  35  /  AlkPhos  178<H>  11-25                                                                                           LIVER FUNCTIONS - ( 25 Nov 2018 16:00 )  Alb: 3.1 g/dL / Pro: 5.6 g/dL / ALK PHOS: 178 U/L / ALT: 35 U/L / AST: 39 U/L / GGT: x                                                                                                                                   ABG - ( 25 Nov 2018 23:16 )  pH, Arterial: 7.24  pH, Blood: x     /  pCO2: 55    /  pO2: 90    / HCO3: 24    / Base Excess: -4.1  /  SaO2: 97              MEDICATIONS  (STANDING):  aspirin enteric coated 81 milliGRAM(s) Oral daily  azithromycin  IVPB 500 milliGRAM(s) IV Intermittent every 24 hours  calcitriol   Capsule 0.25 MICROGram(s) Oral daily  carvedilol 12.5 milliGRAM(s) Oral every 12 hours  cefTRIAXone   IVPB      cefTRIAXone   IVPB 1 Gram(s) IV Intermittent every 24 hours  dextrose 5%. 1000 milliLiter(s) (50 mL/Hr) IV Continuous <Continuous>  dextrose 50% Injectable 12.5 Gram(s) IV Push once  dextrose 50% Injectable 25 Gram(s) IV Push once  dextrose 50% Injectable 25 Gram(s) IV Push once  heparin  Injectable 5000 Unit(s) SubCutaneous every 8 hours  insulin glargine Injectable (LANTUS) 15 Unit(s) SubCutaneous at bedtime  insulin lispro (HumaLOG) corrective regimen sliding scale   SubCutaneous three times a day before meals  insulin lispro Injectable (HumaLOG) 5 Unit(s) SubCutaneous three times a day before meals  isosorbide   mononitrate ER Tablet (IMDUR) 30 milliGRAM(s) Oral daily  levothyroxine 50 MICROGram(s) Oral daily  methylPREDNISolone sodium succinate Injectable 60 milliGRAM(s) IV Push every 12 hours  pantoprazole    Tablet 40 milliGRAM(s) Oral before breakfast  sertraline 50 milliGRAM(s) Oral daily  sodium chloride 0.9%. 1000 milliLiter(s) (50 mL/Hr) IV Continuous <Continuous>    MEDICATIONS  (PRN):  dextrose 40% Gel 15 Gram(s) Oral once PRN Blood Glucose LESS THAN 70 milliGRAM(s)/deciliter  glucagon  Injectable 1 milliGRAM(s) IntraMuscular once PRN Glucose LESS THAN 70 milligrams/deciliter

## 2018-11-26 NOTE — PROGRESS NOTE ADULT - SUBJECTIVE AND OBJECTIVE BOX
LENGTH OF HOSPITAL STAY: 1d    CHIEF COMPLAINT:   Patient is a 77y old  Female who presents with a chief complaint of difficulty breathing (26 Nov 2018 08:12)      HISTORY OF PRESENTING ILLNESS:    HPI:  76 yo female with PMHx COPD on home oxygen, HTN, HLD, DM, history of bypass, CKD not on dialysis presents for shortness of breath, congestion, productive cough x 4 days. Patient states she is home for the holidays with multiple sick contacts at home and congestion has been steadily getting worse. Patient has 2L of home oxygen at home but states she is so congested she cannot breath from her nose. Patient feels tired and weak. Has a cough with productive sputum - greenish in color. Patient/family denies fevers, chest pain, abdominal pain, nausea, vomiting, diarrhea, leg pain/swelling, changes in PO intake, changes in urine output, changes in mental status.  Baseline, patient is able to walk but has to frequently stop due to severe PAD.  She reports her vascular surgeon says she's too high risk for surgery.      In ED, pt was found to be saturating at high 70's on 4L of NC and she was placed on bipap - saturating at 100%. (25 Nov 2018 20:21)    PAST MEDICAL & SURGICAL HISTORY  PAST MEDICAL & SURGICAL HISTORY:  Dyslipidemia  Coronary artery disease  Diabetes mellitus  COPD (chronic obstructive pulmonary disease)  Dyslipidemia  Chronic kidney disease  Hypertension  H/O hernia repair  S/P CABG (coronary artery bypass graft)    SOCIAL HISTORY:    ALLERGIES:  No Known Allergies    MEDICATIONS:  STANDING MEDICATIONS  aspirin enteric coated 81 milliGRAM(s) Oral daily  azithromycin  IVPB 500 milliGRAM(s) IV Intermittent every 24 hours  calcitriol   Capsule 0.25 MICROGram(s) Oral daily  carvedilol 12.5 milliGRAM(s) Oral every 12 hours  cefTRIAXone   IVPB      cefTRIAXone   IVPB 1 Gram(s) IV Intermittent every 24 hours  dextrose 5%. 1000 milliLiter(s) IV Continuous <Continuous>  dextrose 50% Injectable 12.5 Gram(s) IV Push once  dextrose 50% Injectable 25 Gram(s) IV Push once  dextrose 50% Injectable 25 Gram(s) IV Push once  heparin  Injectable 5000 Unit(s) SubCutaneous every 8 hours  insulin glargine Injectable (LANTUS) 15 Unit(s) SubCutaneous at bedtime  insulin lispro (HumaLOG) corrective regimen sliding scale   SubCutaneous three times a day before meals  insulin lispro Injectable (HumaLOG) 5 Unit(s) SubCutaneous three times a day before meals  isosorbide   mononitrate ER Tablet (IMDUR) 30 milliGRAM(s) Oral daily  levothyroxine 50 MICROGram(s) Oral daily  methylPREDNISolone sodium succinate Injectable 60 milliGRAM(s) IV Push every 12 hours  pantoprazole    Tablet 40 milliGRAM(s) Oral before breakfast  sertraline 50 milliGRAM(s) Oral daily  sodium chloride 0.9%. 1000 milliLiter(s) IV Continuous <Continuous>    PRN MEDICATIONS  acetaminophen   Tablet .. 650 milliGRAM(s) Oral every 6 hours PRN  dextrose 40% Gel 15 Gram(s) Oral once PRN  glucagon  Injectable 1 milliGRAM(s) IntraMuscular once PRN  ondansetron Injectable 4 milliGRAM(s) IV Push every 6 hours PRN    VITALS & PHYSICAL EXAM:  Vital Signs Last 24 Hrs  T(C): 36.8 (26 Nov 2018 08:23), Max: 36.8 (26 Nov 2018 08:23)  T(F): 98.3 (26 Nov 2018 08:23), Max: 98.3 (26 Nov 2018 08:23)  HR: 72 (26 Nov 2018 08:23) (63 - 72)  BP: 138/63 (26 Nov 2018 08:23) (138/63 - 166/72)  BP(mean): --  RR: 22 (26 Nov 2018 08:23) (18 - 22)  SpO2: 96% (26 Nov 2018 08:23) (88% - 99%)    GENERAL: AAOx3, no acute distress.  HEAD:  Atraumatic, Normocephalic  EYES: EOMI, PERRLA, conjunctiva and sclera clear  NECK: Supple, no JVD, cervical lymphadenopathy or thyromegaly  CHEST/LUNG: b/l diffuse wheezing.  Pt on bipap.   HEART: Regular rate and rhythm; normal S1, S2, No murmurs, rubs, or gallops  ABDOMEN: Soft, nontender, nondistended; Bowel sounds present, no abdominal bruit, masses, or hepatosplenomegaly  EXTREMITIES: decreased pulses b/l - dorsalis pedis, tibial artery pulse.  Pale feet but warm to touch.    PSYCH:  Cooperative and appropriate  NEUROLOGY: No asterixis or tremor. Motor and sensory functions grossly intact  SKIN: Intact  Lymphatic system: No lymphadenopathy    LABS:                        8.8    5.38  )-----------( 122      ( 26 Nov 2018 09:30 )             29.5     11-26    139  |  102  |  81<HH>  ----------------------------<  204<H>  4.9   |  20  |  5.3<HH>    Ca    8.8      26 Nov 2018 09:30    TPro  5.5<L>  /  Alb  2.7<L>  /  TBili  0.2  /  DBili  <0.2  /  AST  30  /  ALT  32  /  AlkPhos  174<H>  11-26    ABG - ( 25 Nov 2018 23:16 )  pH, Arterial: 7.24  pH, Blood: x     /  pCO2: 55    /  pO2: 90    / HCO3: 24    / Base Excess: -4.1  /  SaO2: 97          RADIOLOGY:  < from: US Kidney and Bladder (11.26.18 @ 09:40) >  Impression:  No hydronephrosis or stone in either kidney.  Bilateral renal simple cysts.  < end of copied text >    < from: Xray Chest 1 View-PORTABLE IMMEDIATE (11.25.18 @ 18:29) >  Impression:    Pulmonary venous hypertension, mild. Follow-up as needed.  < end of copied text >

## 2018-11-27 LAB
ALBUMIN SERPL ELPH-MCNC: 2.5 G/DL — LOW (ref 3.5–5.2)
ALP SERPL-CCNC: 147 U/L — HIGH (ref 30–115)
ALT FLD-CCNC: 28 U/L — SIGNIFICANT CHANGE UP (ref 0–41)
ANION GAP SERPL CALC-SCNC: 15 MMOL/L — HIGH (ref 7–14)
ANISOCYTOSIS BLD QL: SLIGHT — SIGNIFICANT CHANGE UP
AST SERPL-CCNC: 20 U/L — SIGNIFICANT CHANGE UP (ref 0–41)
BASOPHILS # BLD AUTO: 0 K/UL — SIGNIFICANT CHANGE UP (ref 0–0.2)
BASOPHILS NFR BLD AUTO: 0 % — SIGNIFICANT CHANGE UP (ref 0–1)
BILIRUB SERPL-MCNC: <0.2 MG/DL — SIGNIFICANT CHANGE UP (ref 0.2–1.2)
BUN SERPL-MCNC: 93 MG/DL — CRITICAL HIGH (ref 10–20)
CALCIUM SERPL-MCNC: 9.1 MG/DL — SIGNIFICANT CHANGE UP (ref 8.5–10.1)
CHLORIDE SERPL-SCNC: 105 MMOL/L — SIGNIFICANT CHANGE UP (ref 98–110)
CK MB CFR SERPL CALC: 2.9 NG/ML — SIGNIFICANT CHANGE UP (ref 0.6–6.3)
CK SERPL-CCNC: 18 U/L — SIGNIFICANT CHANGE UP (ref 0–225)
CO2 SERPL-SCNC: 20 MMOL/L — SIGNIFICANT CHANGE UP (ref 17–32)
CREAT SERPL-MCNC: 5.4 MG/DL — CRITICAL HIGH (ref 0.7–1.5)
EOSINOPHIL # BLD AUTO: 0 K/UL — SIGNIFICANT CHANGE UP (ref 0–0.7)
EOSINOPHIL NFR BLD AUTO: 0 % — SIGNIFICANT CHANGE UP (ref 0–8)
GIANT PLATELETS BLD QL SMEAR: PRESENT — SIGNIFICANT CHANGE UP
GLUCOSE BLDC GLUCOMTR-MCNC: 228 MG/DL — HIGH (ref 70–99)
GLUCOSE BLDC GLUCOMTR-MCNC: 251 MG/DL — HIGH (ref 70–99)
GLUCOSE BLDC GLUCOMTR-MCNC: 261 MG/DL — HIGH (ref 70–99)
GLUCOSE BLDC GLUCOMTR-MCNC: 273 MG/DL — HIGH (ref 70–99)
GLUCOSE BLDC GLUCOMTR-MCNC: 277 MG/DL — HIGH (ref 70–99)
GLUCOSE SERPL-MCNC: 219 MG/DL — HIGH (ref 70–99)
HCT VFR BLD CALC: 28.5 % — LOW (ref 37–47)
HGB BLD-MCNC: 8.4 G/DL — LOW (ref 12–16)
LYMPHOCYTES # BLD AUTO: 0.38 K/UL — LOW (ref 1.2–3.4)
LYMPHOCYTES # BLD AUTO: 3.6 % — LOW (ref 20.5–51.1)
MACROCYTES BLD QL: SLIGHT — SIGNIFICANT CHANGE UP
MAGNESIUM SERPL-MCNC: 1.9 MG/DL — SIGNIFICANT CHANGE UP (ref 1.8–2.4)
MANUAL SMEAR VERIFICATION: SIGNIFICANT CHANGE UP
MCHC RBC-ENTMCNC: 26.6 PG — LOW (ref 27–31)
MCHC RBC-ENTMCNC: 29.5 G/DL — LOW (ref 32–37)
MCV RBC AUTO: 90.2 FL — SIGNIFICANT CHANGE UP (ref 81–99)
MONOCYTES # BLD AUTO: 0 K/UL — LOW (ref 0.1–0.6)
MONOCYTES NFR BLD AUTO: 0 % — LOW (ref 1.7–9.3)
MYELOCYTES NFR BLD: 0.9 % — HIGH (ref 0–0)
NEUTROPHILS # BLD AUTO: 10.21 K/UL — HIGH (ref 1.4–6.5)
NEUTROPHILS NFR BLD AUTO: 91.9 % — HIGH (ref 42.2–75.2)
NEUTS BAND # BLD: 3.6 % — SIGNIFICANT CHANGE UP (ref 0–6)
NRBC # BLD: 0 /100 WBCS — SIGNIFICANT CHANGE UP (ref 0–0)
PLAT MORPH BLD: NORMAL — SIGNIFICANT CHANGE UP
PLATELET # BLD AUTO: 135 K/UL — SIGNIFICANT CHANGE UP (ref 130–400)
POIKILOCYTOSIS BLD QL AUTO: SIGNIFICANT CHANGE UP
POTASSIUM SERPL-MCNC: 5.2 MMOL/L — HIGH (ref 3.5–5)
POTASSIUM SERPL-SCNC: 5.2 MMOL/L — HIGH (ref 3.5–5)
PROT SERPL-MCNC: 5.3 G/DL — LOW (ref 6–8)
RBC # BLD: 3.16 M/UL — LOW (ref 4.2–5.4)
RBC # FLD: 17.6 % — HIGH (ref 11.5–14.5)
RBC BLD AUTO: NORMAL — SIGNIFICANT CHANGE UP
SODIUM SERPL-SCNC: 140 MMOL/L — SIGNIFICANT CHANGE UP (ref 135–146)
TROPONIN T SERPL-MCNC: <0.01 NG/ML — SIGNIFICANT CHANGE UP
WBC # BLD: 10.69 K/UL — SIGNIFICANT CHANGE UP (ref 4.8–10.8)
WBC # FLD AUTO: 10.69 K/UL — SIGNIFICANT CHANGE UP (ref 4.8–10.8)

## 2018-11-27 RX ORDER — IPRATROPIUM/ALBUTEROL SULFATE 18-103MCG
3 AEROSOL WITH ADAPTER (GRAM) INHALATION EVERY 6 HOURS
Qty: 0 | Refills: 0 | Status: DISCONTINUED | OUTPATIENT
Start: 2018-11-27 | End: 2018-11-29

## 2018-11-27 RX ORDER — SODIUM BICARBONATE 1 MEQ/ML
650 SYRINGE (ML) INTRAVENOUS THREE TIMES A DAY
Qty: 0 | Refills: 0 | Status: DISCONTINUED | OUTPATIENT
Start: 2018-11-27 | End: 2018-12-04

## 2018-11-27 RX ORDER — INFLUENZA VIRUS VACCINE 15; 15; 15; 15 UG/.5ML; UG/.5ML; UG/.5ML; UG/.5ML
0.5 SUSPENSION INTRAMUSCULAR ONCE
Qty: 0 | Refills: 0 | Status: DISCONTINUED | OUTPATIENT
Start: 2018-11-27 | End: 2018-12-04

## 2018-11-27 RX ADMIN — Medication 3: at 17:54

## 2018-11-27 RX ADMIN — Medication 81 MILLIGRAM(S): at 12:10

## 2018-11-27 RX ADMIN — CARVEDILOL PHOSPHATE 12.5 MILLIGRAM(S): 80 CAPSULE, EXTENDED RELEASE ORAL at 17:56

## 2018-11-27 RX ADMIN — SERTRALINE 50 MILLIGRAM(S): 25 TABLET, FILM COATED ORAL at 12:09

## 2018-11-27 RX ADMIN — HEPARIN SODIUM 5000 UNIT(S): 5000 INJECTION INTRAVENOUS; SUBCUTANEOUS at 06:11

## 2018-11-27 RX ADMIN — Medication 3 MILLILITER(S): at 20:14

## 2018-11-27 RX ADMIN — Medication 60 MILLIGRAM(S): at 06:10

## 2018-11-27 RX ADMIN — Medication 650 MILLIGRAM(S): at 15:45

## 2018-11-27 RX ADMIN — INSULIN GLARGINE 15 UNIT(S): 100 INJECTION, SOLUTION SUBCUTANEOUS at 22:54

## 2018-11-27 RX ADMIN — Medication 650 MILLIGRAM(S): at 21:07

## 2018-11-27 RX ADMIN — HEPARIN SODIUM 5000 UNIT(S): 5000 INJECTION INTRAVENOUS; SUBCUTANEOUS at 13:50

## 2018-11-27 RX ADMIN — AZITHROMYCIN 255 MILLIGRAM(S): 500 TABLET, FILM COATED ORAL at 23:45

## 2018-11-27 RX ADMIN — Medication 60 MILLIGRAM(S): at 17:55

## 2018-11-27 RX ADMIN — CARVEDILOL PHOSPHATE 12.5 MILLIGRAM(S): 80 CAPSULE, EXTENDED RELEASE ORAL at 08:27

## 2018-11-27 RX ADMIN — ISOSORBIDE MONONITRATE 30 MILLIGRAM(S): 60 TABLET, EXTENDED RELEASE ORAL at 12:09

## 2018-11-27 RX ADMIN — CALCITRIOL 0.25 MICROGRAM(S): 0.5 CAPSULE ORAL at 12:09

## 2018-11-27 RX ADMIN — Medication 5 UNIT(S): at 17:54

## 2018-11-27 RX ADMIN — Medication 3 MILLILITER(S): at 15:48

## 2018-11-27 RX ADMIN — SODIUM CHLORIDE 50 MILLILITER(S): 9 INJECTION INTRAMUSCULAR; INTRAVENOUS; SUBCUTANEOUS at 06:11

## 2018-11-27 RX ADMIN — Medication 2: at 08:25

## 2018-11-27 RX ADMIN — PANTOPRAZOLE SODIUM 40 MILLIGRAM(S): 20 TABLET, DELAYED RELEASE ORAL at 08:27

## 2018-11-27 RX ADMIN — AZITHROMYCIN 255 MILLIGRAM(S): 500 TABLET, FILM COATED ORAL at 00:42

## 2018-11-27 RX ADMIN — HEPARIN SODIUM 5000 UNIT(S): 5000 INJECTION INTRAVENOUS; SUBCUTANEOUS at 22:55

## 2018-11-27 RX ADMIN — Medication 1: at 12:07

## 2018-11-27 RX ADMIN — Medication 5 UNIT(S): at 08:25

## 2018-11-27 RX ADMIN — Medication 50 MICROGRAM(S): at 08:27

## 2018-11-27 RX ADMIN — SODIUM CHLORIDE 50 MILLILITER(S): 9 INJECTION INTRAMUSCULAR; INTRAVENOUS; SUBCUTANEOUS at 12:11

## 2018-11-27 NOTE — PROGRESS NOTE ADULT - ASSESSMENT
78 yo female with PMHx COPD on home oxygen, HTN, HLD, DM, history of bypass, CKD not on dialysis presents for shortness of breath, congestion, productive cough x 4 days.    #AHRF 2/2 COPD Exacerbation   - C/w solumedrol   -  d/c rocephin c/w azithromycin   - Q6 nebulizers  - c/w solumedrol 60 q 12  - flu and RVP panel negative  - blood cultures  - check echo   - c/w bipap HS and PRN    #MARTIN on CKD  - Nephro consult  - hold nephrotoxic agents  - daily bmp's   - c./w NS at 50 cc/hr     #Anemia  - likely anemia of chronic disease vs TEDDY   - will check iron studies     #HTN  -c/w home meds: carvedilol, imdur  -hold enalapril due to MARTIN    #DM  -monitor fingersticks  -ISS    #CAD s/p CABG; c/w aspirin    Nutrition: DASH/ TLC  Prophylaxis: Heparin and protonix  Activity: out of bed to chair + ambulate as tolerated    Dispo: From home    Code Status:  Pt and family want patient to be DNI but would like CPR performed.  It was explained that CPR stands for cardiopulmonary resuscitation and intubation is a part of CPR.  Family disagrees and re-iterate DNI with CPR.  MOLST form was brought to patient to sign DNI alone and family refused.  At this time, patient is full code. 77F with PMHx COPD on home oxygen, HTN, HLD, DM, history of bypass, CKD not on dialysis presents for shortness of breath, congestion, productive cough x 4 days.    #AHRF 2/2 COPD Exacerbation   - C/w solumedrol   -  d/c rocephin c/w azithromycin   - Q6 nebulizers  - c/w solumedrol 60 q 12  - flu and RVP panel negative  - blood cultures  - check echo   - c/w bipap HS and PRN    #MARTIN on CKD  - Nephro consult  - hold nephrotoxic agents  - daily bmp's   - c./w NS at 50 cc/hr     #Anemia  - likely anemia of chronic disease vs TEDDY   - will check iron studies     #HTN  -c/w home meds: carvedilol, imdur  -hold enalapril due to MARTIN    #DM  -monitor fingersticks  -ISS    #CAD s/p CABG; c/w aspirin    Nutrition: DASH/ TLC  Prophylaxis: Heparin and protonix  Activity: out of bed to chair + ambulate as tolerated    Dispo: From home    Code Status:  Pt and family want patient to be DNI but would like CPR performed.  It was explained that CPR stands for cardiopulmonary resuscitation and intubation is a part of CPR.  Family disagrees and re-iterate DNI with CPR.  MOLST form was brought to patient to sign DNI alone and family refused.  At this time, patient is full code.

## 2018-11-27 NOTE — PATIENT PROFILE ADULT - FUNCTIONAL SCREEN CURRENT LEVEL: EATING, MLM
0 = independent I have personally performed a face to face diagnostic evaluation on this patient. I have reviewed the ACP note and agree with the history, exam and plan of care, except as noted.

## 2018-11-27 NOTE — PROGRESS NOTE ADULT - SUBJECTIVE AND OBJECTIVE BOX
CHIEF COMPLAINT:    Patient is a 77y old  Female who presents with a chief complaint of difficulty breathing (27 Nov 2018 10:42)      INTERVAL HPI/OVERNIGHT EVENTS:    Patient seen and examined at bedside. No acute overnight events occurred.    ROS: Pt reports feeling very weak. No improvement in wheezing/ SOB. All other systems are negative.    Vital Signs:    T(F): 96.6 (11-27-18 @ 08:18), Max: 98.1 (11-26-18 @ 21:13)  HR: 66 (11-27-18 @ 08:18) (63 - 74)  BP: 121/67 (11-27-18 @ 08:18) (108/53 - 121/67)  RR: 20 (11-27-18 @ 08:18) (18 - 20)  SpO2: 99% (11-27-18 @ 09:09) (96% - 100%)  I&O's Summary    Daily     Daily   CAPILLARY BLOOD GLUCOSE      POCT Blood Glucose.: 228 mg/dL (27 Nov 2018 08:03)  POCT Blood Glucose.: 236 mg/dL (26 Nov 2018 23:01)  POCT Blood Glucose.: 244 mg/dL (26 Nov 2018 16:51)  POCT Blood Glucose.: 241 mg/dL (26 Nov 2018 15:09)      PHYSICAL EXAM:    GENERAL:  NAD  SKIN: No rashes or lesions  HEENT: Atraumatic. Normocephalic. Anicteric  NECK:  No JVD.   PULMONARY: Diffuse, loud wheezing.  CVS: Normal S1, S2. Regular rate and rhythm. No murmurs.  ABDOMEN/GI: Soft, Nontender, Nondistended; Bowel sounds are present  EXTREMITIES:  No edema B/L LE.  NEUROLOGIC:  No motor deficit.  PSYCH: Alert & oriented x 3, normal affect    Consultant(s) Notes Reviewed:  [x ] YES  [ ] NO  EKG reviewed      LABS:                        8.4    10.69 )-----------( 135      ( 27 Nov 2018 09:36 )             28.5     11-27    140  |  105  |  93<HH>  ----------------------------<  219<H>  5.2<H>   |  20  |  5.4<HH>    Ca    9.1      27 Nov 2018 09:36  Mg     1.9     11-27    TPro  5.3<L>  /  Alb  2.5<L>  /  TBili  <0.2  /  DBili  x   /  AST  20  /  ALT  28  /  AlkPhos  147<H>  11-27      Serum Pro-Brain Natriuretic Peptide: 25209 pg/mL (11-26-18 @ 01:00)        Culture - Blood (collected 26 Nov 2018 01:00)  Source: .Blood None  Preliminary Report (27 Nov 2018 13:01):    No growth to date.    Medications:  Standing  aspirin enteric coated 81 milliGRAM(s) Oral daily  azithromycin  IVPB 500 milliGRAM(s) IV Intermittent every 24 hours  calcitriol   Capsule 0.25 MICROGram(s) Oral daily  carvedilol 12.5 milliGRAM(s) Oral every 12 hours  cefTRIAXone   IVPB      cefTRIAXone   IVPB 1 Gram(s) IV Intermittent every 24 hours  dextrose 5%. 1000 milliLiter(s) IV Continuous <Continuous>  dextrose 50% Injectable 12.5 Gram(s) IV Push once  dextrose 50% Injectable 25 Gram(s) IV Push once  dextrose 50% Injectable 25 Gram(s) IV Push once  heparin  Injectable 5000 Unit(s) SubCutaneous every 8 hours  insulin glargine Injectable (LANTUS) 15 Unit(s) SubCutaneous at bedtime  insulin lispro (HumaLOG) corrective regimen sliding scale   SubCutaneous three times a day before meals  insulin lispro Injectable (HumaLOG) 5 Unit(s) SubCutaneous three times a day before meals  isosorbide   mononitrate ER Tablet (IMDUR) 30 milliGRAM(s) Oral daily  levothyroxine 50 MICROGram(s) Oral daily  methylPREDNISolone sodium succinate Injectable 60 milliGRAM(s) IV Push every 12 hours  pantoprazole    Tablet 40 milliGRAM(s) Oral before breakfast  sertraline 50 milliGRAM(s) Oral daily  sodium chloride 0.9%. 1000 milliLiter(s) IV Continuous <Continuous>    PRN Meds  acetaminophen   Tablet .. 650 milliGRAM(s) Oral every 6 hours PRN  dextrose 40% Gel 15 Gram(s) Oral once PRN  glucagon  Injectable 1 milliGRAM(s) IntraMuscular once PRN  ondansetron Injectable 4 milliGRAM(s) IV Push every 6 hours PRN      Case discussed with resident    Care discussed with pt

## 2018-11-27 NOTE — PROGRESS NOTE ADULT - SUBJECTIVE AND OBJECTIVE BOX
OVERNIGHT EVENTS: on BIPAP overnight, CXR this AM reviewed    Vital Signs Last 24 Hrs  T(C): 36.7 (27 Nov 2018 00:12), Max: 36.7 (26 Nov 2018 21:13)  T(F): 98 (27 Nov 2018 00:12), Max: 98.1 (26 Nov 2018 21:13)  HR: 63 (27 Nov 2018 00:12) (63 - 74)  BP: 110/55 (27 Nov 2018 00:12) (108/53 - 150/68)  RR: 18 (27 Nov 2018 00:12) (18 - 20)  SpO2: 98% (27 Nov 2018 00:12) (96% - 100%)    PHYSICAL EXAMINATION:    GENERAL: The patient is awake and alert in no apparent distress.     HEENT: Head is normocephalic and atraumatic. Extraocular muscles are intact. Mucous membranes are moist.    NECK: Supple.    LUNGS: diffuse exp wheezing    HEART: Regular rate and rhythm without murmur.    ABDOMEN: Soft, nontender, and nondistended.      EXTREMITIES: Without any cyanosis, clubbing, rash, lesions or edema.    NEUROLOGIC: Grossly intact.    SKIN: No ulceration or induration present.      LABS:                        8.8    5.38  )-----------( 122      ( 26 Nov 2018 09:30 )             29.5     11-26    139  |  102  |  81<HH>  ----------------------------<  204<H>  4.9   |  20  |  5.3<HH>    Ca    8.8      26 Nov 2018 09:30    TPro  5.5<L>  /  Alb  2.7<L>  /  TBili  0.2  /  DBili  <0.2  /  AST  30  /  ALT  32  /  AlkPhos  174<H>  11-26        ABG - ( 25 Nov 2018 23:16 )  pH, Arterial: 7.24  pH, Blood: x     /  pCO2: 55    /  pO2: 90    / HCO3: 24    / Base Excess: -4.1  /  SaO2: 97                      Serum Pro-Brain Natriuretic Peptide: 07576 pg/mL (11-26-18 @ 01:00)              MICROBIOLOGY:      MEDICATIONS  (STANDING):  aspirin enteric coated 81 milliGRAM(s) Oral daily  azithromycin  IVPB 500 milliGRAM(s) IV Intermittent every 24 hours  calcitriol   Capsule 0.25 MICROGram(s) Oral daily  carvedilol 12.5 milliGRAM(s) Oral every 12 hours  cefTRIAXone   IVPB      cefTRIAXone   IVPB 1 Gram(s) IV Intermittent every 24 hours  dextrose 5%. 1000 milliLiter(s) (50 mL/Hr) IV Continuous <Continuous>  dextrose 50% Injectable 12.5 Gram(s) IV Push once  dextrose 50% Injectable 25 Gram(s) IV Push once  dextrose 50% Injectable 25 Gram(s) IV Push once  heparin  Injectable 5000 Unit(s) SubCutaneous every 8 hours  insulin glargine Injectable (LANTUS) 15 Unit(s) SubCutaneous at bedtime  insulin lispro (HumaLOG) corrective regimen sliding scale   SubCutaneous three times a day before meals  insulin lispro Injectable (HumaLOG) 5 Unit(s) SubCutaneous three times a day before meals  isosorbide   mononitrate ER Tablet (IMDUR) 30 milliGRAM(s) Oral daily  levothyroxine 50 MICROGram(s) Oral daily  methylPREDNISolone sodium succinate Injectable 60 milliGRAM(s) IV Push every 12 hours  pantoprazole    Tablet 40 milliGRAM(s) Oral before breakfast  sertraline 50 milliGRAM(s) Oral daily  sodium chloride 0.9%. 1000 milliLiter(s) (50 mL/Hr) IV Continuous <Continuous>    MEDICATIONS  (PRN):  acetaminophen   Tablet .. 650 milliGRAM(s) Oral every 6 hours PRN Moderate Pain (4 - 6)  dextrose 40% Gel 15 Gram(s) Oral once PRN Blood Glucose LESS THAN 70 milliGRAM(s)/deciliter  glucagon  Injectable 1 milliGRAM(s) IntraMuscular once PRN Glucose LESS THAN 70 milligrams/deciliter  ondansetron Injectable 4 milliGRAM(s) IV Push every 6 hours PRN Nausea and/or Vomiting      RADIOLOGY & ADDITIONAL STUDIES:

## 2018-11-27 NOTE — PROGRESS NOTE ADULT - ASSESSMENT
IMPRESSION:    Acute hypercapnic respiratory failure/ COPD exacerbation - likely secondary to viral etiology  multiple co morbidities    PLAN:    - keep IV steroids today  - dc rocephin  - BIPAP ar nite and as needed  - neb q 6 h  - DVT prophylaxis  - poor prognosis

## 2018-11-27 NOTE — PROGRESS NOTE ADULT - ASSESSMENT
Patient with MARTIN on CKD presented to hospital for SOB, congestion and productive cough.  PMH HTN, DM, CKD (baseline cr. not available), COPD on home oxygen, CAD s/p CABG, DLD, PAD.     MARTIN on CKD Plus acidosis   - serum cr. trending between 5.5 and 4.9   - can be Prerenal MARTIN on CKD due to dehydration/ decreased intake vs CKD stable/progression    - ABG noted for Respiratory acidosis plus HAGMA. serum lactate WNL.   - No need for RRT at moment   - will contact her primary nephrologist in [facility] to inquire about her baseline Cr.   - cont. IVF with NS, hold lasix for now.   - Monitor I & O   - US KUB noted, no hydro   - Repeat BMP, check IP, Mg, check ECHO   - Repeat VBG     HTN   - BP noted, under control, keep current     Anemia   - Hb noted   - no need for KYMBERLY at the moment   - check Fe studies   - Monitor h/h, Tx if hb < 7.     PNA / Sepsis   - cont. ABx   - consider ID consult    Will follow

## 2018-11-27 NOTE — PROGRESS NOTE ADULT - SUBJECTIVE AND OBJECTIVE BOX
Nephrology progress note    Patient is seen and examined, events over the last 24 h noted.  No new complaints.    Allergies:  No Known Allergies    Hospital Medications:   MEDICATIONS  (STANDING):  aspirin enteric coated 81 milliGRAM(s) Oral daily  azithromycin  IVPB 500 milliGRAM(s) IV Intermittent every 24 hours  calcitriol   Capsule 0.25 MICROGram(s) Oral daily  carvedilol 12.5 milliGRAM(s) Oral every 12 hours  cefTRIAXone   IVPB      cefTRIAXone   IVPB 1 Gram(s) IV Intermittent every 24 hours  dextrose 5%. 1000 milliLiter(s) (50 mL/Hr) IV Continuous <Continuous>  heparin  Injectable 5000 Unit(s) SubCutaneous every 8 hours  insulin glargine Injectable (LANTUS) 15 Unit(s) SubCutaneous at bedtime  insulin lispro (HumaLOG) corrective regimen sliding scale   SubCutaneous three times a day before meals  insulin lispro Injectable (HumaLOG) 5 Unit(s) SubCutaneous three times a day before meals  isosorbide   mononitrate ER Tablet (IMDUR) 30 milliGRAM(s) Oral daily  levothyroxine 50 MICROGram(s) Oral daily  methylPREDNISolone sodium succinate Injectable 60 milliGRAM(s) IV Push every 12 hours  pantoprazole    Tablet 40 milliGRAM(s) Oral before breakfast  sertraline 50 milliGRAM(s) Oral daily  sodium chloride 0.9%. 1000 milliLiter(s) (50 mL/Hr) IV Continuous <Continuous>        VITALS:  T(F): 96.6 (11-27-18 @ 08:18), Max: 98.1 (11-26-18 @ 21:13)  HR: 66 (11-27-18 @ 08:18)  BP: 121/67 (11-27-18 @ 08:18)  RR: 20 (11-27-18 @ 08:18)  SpO2: 99% (11-27-18 @ 09:09)          PHYSICAL EXAM:  Constitutional: NAD  Respiratory: CTAB, no wheezes, rales or rhonchi  Cardiovascular: S1, S2, RRR  Gastrointestinal: BS+, soft, NT/ND  Extremities: No peripheral edema  :  tam in place      LABS:  11-26    139  |  102  |  81<HH>  ----------------------------<  204<H>  4.9   |  20  |  5.3<HH>    Ca    8.8      26 Nov 2018 09:30    TPro  5.5<L>  /  Alb  2.7<L>  /  TBili  0.2  /  DBili  <0.2  /  AST  30  /  ALT  32  /  AlkPhos  174<H>  11-26                          8.4    10.69 )-----------( 135      ( 27 Nov 2018 09:36 )             28.5     Blood Gas Arterial, Lactate: 0.6 mmoL/L (11.25.18 @ 23:16)    Blood Gas Profile - Arterial (11.25.18 @ 23:16)    pH, Arterial: 7.24    pCO2, Arterial: 55 mmHg    pO2, Arterial: 90 mmHg    HCO3, Arterial: 24 mmoL/L    Base Excess, Arterial: -4.1 mmoL/L    Oxygen Saturation, Arterial: 97 %      Urine Studies:      RADIOLOGY & ADDITIONAL STUDIES:  < from: US Kidney and Bladder (11.26.18 @ 09:40) >  Impression:    No hydronephrosis or stone in either kidney.    Bilateral renal simple cysts.    < end of copied text >    < from: Xray Chest 1 View-PORTABLE IMMEDIATE (11.25.18 @ 18:29) >  Impression:      Pulmonary venous hypertension, mild. Follow-up as needed.    < end of copied text >

## 2018-11-27 NOTE — PROGRESS NOTE ADULT - SUBJECTIVE AND OBJECTIVE BOX
SUBJECTIVE:    Patient is a 77y old Female who presents with a chief complaint of difficulty breathing (27 Nov 2018 13:08)    Currently admitted to medicine with the primary diagnosis of COPD exacerbation      Today is hospital day 2d. This morning she is resting comfortably in bed and reports no new issues or overnight events.     PAST MEDICAL & SURGICAL HISTORY  Dyslipidemia  Coronary artery disease  Diabetes mellitus  COPD (chronic obstructive pulmonary disease)  Dyslipidemia  Chronic kidney disease  Hypertension  H/O hernia repair  S/P CABG (coronary artery bypass graft)    SOCIAL HISTORY:  Negative for smoking/alcohol/drug use.     ALLERGIES:  No Known Allergies    MEDICATIONS:  STANDING MEDICATIONS  ALBUTerol/ipratropium for Nebulization 3 milliLiter(s) Nebulizer every 6 hours  aspirin enteric coated 81 milliGRAM(s) Oral daily  azithromycin  IVPB 500 milliGRAM(s) IV Intermittent every 24 hours  calcitriol   Capsule 0.25 MICROGram(s) Oral daily  carvedilol 12.5 milliGRAM(s) Oral every 12 hours  dextrose 5%. 1000 milliLiter(s) IV Continuous <Continuous>  dextrose 50% Injectable 12.5 Gram(s) IV Push once  dextrose 50% Injectable 25 Gram(s) IV Push once  dextrose 50% Injectable 25 Gram(s) IV Push once  heparin  Injectable 5000 Unit(s) SubCutaneous every 8 hours  insulin glargine Injectable (LANTUS) 15 Unit(s) SubCutaneous at bedtime  insulin lispro (HumaLOG) corrective regimen sliding scale   SubCutaneous three times a day before meals  insulin lispro Injectable (HumaLOG) 5 Unit(s) SubCutaneous three times a day before meals  isosorbide   mononitrate ER Tablet (IMDUR) 30 milliGRAM(s) Oral daily  levothyroxine 50 MICROGram(s) Oral daily  methylPREDNISolone sodium succinate Injectable 60 milliGRAM(s) IV Push every 12 hours  pantoprazole    Tablet 40 milliGRAM(s) Oral before breakfast  sertraline 50 milliGRAM(s) Oral daily  sodium chloride 0.9%. 1000 milliLiter(s) IV Continuous <Continuous>    PRN MEDICATIONS  acetaminophen   Tablet .. 650 milliGRAM(s) Oral every 6 hours PRN  dextrose 40% Gel 15 Gram(s) Oral once PRN  glucagon  Injectable 1 milliGRAM(s) IntraMuscular once PRN  ondansetron Injectable 4 milliGRAM(s) IV Push every 6 hours PRN    VITALS:   T(F): 96.6  HR: 66  BP: 121/67  RR: 20  SpO2: 99%    LABS:                        8.4    10.69 )-----------( 135      ( 27 Nov 2018 09:36 )             28.5     11-27    140  |  105  |  93<HH>  ----------------------------<  219<H>  5.2<H>   |  20  |  5.4<HH>    Ca    9.1      27 Nov 2018 09:36  Mg     1.9     11-27    TPro  5.3<L>  /  Alb  2.5<L>  /  TBili  <0.2  /  DBili  x   /  AST  20  /  ALT  28  /  AlkPhos  147<H>  11-27        ABG - ( 25 Nov 2018 23:16 )  pH, Arterial: 7.24  pH, Blood: x     /  pCO2: 55    /  pO2: 90    / HCO3: 24    / Base Excess: -4.1  /  SaO2: 97          Culture - Blood (collected 26 Nov 2018 01:00)  Source: .Blood None  Preliminary Report (27 Nov 2018 13:01):    No growth to date.      RADIOLOGY:    < from: Xray Chest 1 View- PORTABLE-Urgent (11.27.18 @ 07:54) >  IMPRESSION:      Diminishing interstitial airspace opacities.      < end of copied text >      PHYSICAL EXAM:  GEN: No acute distress  LUNGS: Clear to auscultation bilaterally   HEART: S1/S2 present. RRR.   ABD: Soft, non-tender, non-distended. Bowel sounds present  EXT: NC/NC/NE/2+PP/PEGUERO  NEURO: AAOX3 SUBJECTIVE:    Patient is a 77y old Female who presents with a chief complaint of difficulty breathing (27 Nov 2018 13:08)    Currently admitted to medicine with the primary diagnosis of COPD exacerbation      Today is hospital day 2d. Patient comfortable this AM on NC sating 100%.     PAST MEDICAL & SURGICAL HISTORY  Dyslipidemia  Coronary artery disease  Diabetes mellitus  COPD (chronic obstructive pulmonary disease)  Dyslipidemia  Chronic kidney disease  Hypertension  H/O hernia repair  S/P CABG (coronary artery bypass graft)    SOCIAL HISTORY:  Negative for smoking/alcohol/drug use.     ALLERGIES:  No Known Allergies    MEDICATIONS:  STANDING MEDICATIONS  ALBUTerol/ipratropium for Nebulization 3 milliLiter(s) Nebulizer every 6 hours  aspirin enteric coated 81 milliGRAM(s) Oral daily  azithromycin  IVPB 500 milliGRAM(s) IV Intermittent every 24 hours  calcitriol   Capsule 0.25 MICROGram(s) Oral daily  carvedilol 12.5 milliGRAM(s) Oral every 12 hours  dextrose 5%. 1000 milliLiter(s) IV Continuous <Continuous>  dextrose 50% Injectable 12.5 Gram(s) IV Push once  dextrose 50% Injectable 25 Gram(s) IV Push once  dextrose 50% Injectable 25 Gram(s) IV Push once  heparin  Injectable 5000 Unit(s) SubCutaneous every 8 hours  insulin glargine Injectable (LANTUS) 15 Unit(s) SubCutaneous at bedtime  insulin lispro (HumaLOG) corrective regimen sliding scale   SubCutaneous three times a day before meals  insulin lispro Injectable (HumaLOG) 5 Unit(s) SubCutaneous three times a day before meals  isosorbide   mononitrate ER Tablet (IMDUR) 30 milliGRAM(s) Oral daily  levothyroxine 50 MICROGram(s) Oral daily  methylPREDNISolone sodium succinate Injectable 60 milliGRAM(s) IV Push every 12 hours  pantoprazole    Tablet 40 milliGRAM(s) Oral before breakfast  sertraline 50 milliGRAM(s) Oral daily  sodium chloride 0.9%. 1000 milliLiter(s) IV Continuous <Continuous>    PRN MEDICATIONS  acetaminophen   Tablet .. 650 milliGRAM(s) Oral every 6 hours PRN  dextrose 40% Gel 15 Gram(s) Oral once PRN  glucagon  Injectable 1 milliGRAM(s) IntraMuscular once PRN  ondansetron Injectable 4 milliGRAM(s) IV Push every 6 hours PRN    VITALS:   T(F): 96.6  HR: 66  BP: 121/67  RR: 20  SpO2: 99%    LABS:                        8.4    10.69 )-----------( 135      ( 27 Nov 2018 09:36 )             28.5     11-27    140  |  105  |  93<HH>  ----------------------------<  219<H>  5.2<H>   |  20  |  5.4<HH>    Ca    9.1      27 Nov 2018 09:36  Mg     1.9     11-27    TPro  5.3<L>  /  Alb  2.5<L>  /  TBili  <0.2  /  DBili  x   /  AST  20  /  ALT  28  /  AlkPhos  147<H>  11-27        ABG - ( 25 Nov 2018 23:16 )  pH, Arterial: 7.24  pH, Blood: x     /  pCO2: 55    /  pO2: 90    / HCO3: 24    / Base Excess: -4.1  /  SaO2: 97          Culture - Blood (collected 26 Nov 2018 01:00)  Source: .Blood None  Preliminary Report (27 Nov 2018 13:01):    No growth to date.      RADIOLOGY:    < from: Xray Chest 1 View- PORTABLE-Urgent (11.27.18 @ 07:54) >  IMPRESSION:      Diminishing interstitial airspace opacities.      < end of copied text >      PHYSICAL EXAM:  GEN: No acute distress  LUNGS: Clear to auscultation bilaterally   HEART: S1/S2 present. RRR.   ABD: Soft, non-tender, non-distended. Bowel sounds present  EXT: NC/NC/NE/2+PP/PEGUERO  NEURO: AAOX3

## 2018-11-28 LAB
ALBUMIN SERPL ELPH-MCNC: 2.7 G/DL — LOW (ref 3.5–5.2)
ALP SERPL-CCNC: 138 U/L — HIGH (ref 30–115)
ALT FLD-CCNC: 28 U/L — SIGNIFICANT CHANGE UP (ref 0–41)
ANION GAP SERPL CALC-SCNC: 15 MMOL/L — HIGH (ref 7–14)
AST SERPL-CCNC: 17 U/L — SIGNIFICANT CHANGE UP (ref 0–41)
BASOPHILS # BLD AUTO: 0.01 K/UL — SIGNIFICANT CHANGE UP (ref 0–0.2)
BASOPHILS NFR BLD AUTO: 0.1 % — SIGNIFICANT CHANGE UP (ref 0–1)
BILIRUB SERPL-MCNC: <0.2 MG/DL — SIGNIFICANT CHANGE UP (ref 0.2–1.2)
BLD GP AB SCN SERPL QL: SIGNIFICANT CHANGE UP
BUN SERPL-MCNC: 98 MG/DL — CRITICAL HIGH (ref 10–20)
CALCIUM SERPL-MCNC: 9.1 MG/DL — SIGNIFICANT CHANGE UP (ref 8.5–10.1)
CHLORIDE SERPL-SCNC: 105 MMOL/L — SIGNIFICANT CHANGE UP (ref 98–110)
CO2 SERPL-SCNC: 20 MMOL/L — SIGNIFICANT CHANGE UP (ref 17–32)
CREAT SERPL-MCNC: 5.4 MG/DL — CRITICAL HIGH (ref 0.7–1.5)
EOSINOPHIL # BLD AUTO: 0 K/UL — SIGNIFICANT CHANGE UP (ref 0–0.7)
EOSINOPHIL NFR BLD AUTO: 0 % — SIGNIFICANT CHANGE UP (ref 0–8)
GGT SERPL-CCNC: 68 U/L — HIGH (ref 1–40)
GLUCOSE BLDC GLUCOMTR-MCNC: 167 MG/DL — HIGH (ref 70–99)
GLUCOSE BLDC GLUCOMTR-MCNC: 197 MG/DL — HIGH (ref 70–99)
GLUCOSE BLDC GLUCOMTR-MCNC: 200 MG/DL — HIGH (ref 70–99)
GLUCOSE BLDC GLUCOMTR-MCNC: 214 MG/DL — HIGH (ref 70–99)
GLUCOSE BLDC GLUCOMTR-MCNC: 256 MG/DL — HIGH (ref 70–99)
GLUCOSE BLDC GLUCOMTR-MCNC: 295 MG/DL — HIGH (ref 70–99)
GLUCOSE SERPL-MCNC: 197 MG/DL — HIGH (ref 70–99)
HCT VFR BLD CALC: 28.7 % — LOW (ref 37–47)
HGB BLD-MCNC: 8.7 G/DL — LOW (ref 12–16)
IMM GRANULOCYTES NFR BLD AUTO: 5.1 % — HIGH (ref 0.1–0.3)
IRON SATN MFR SERPL: 28 % — SIGNIFICANT CHANGE UP (ref 15–50)
IRON SATN MFR SERPL: 61 UG/DL — SIGNIFICANT CHANGE UP (ref 35–150)
LYMPHOCYTES # BLD AUTO: 0.6 K/UL — LOW (ref 1.2–3.4)
LYMPHOCYTES # BLD AUTO: 5.4 % — LOW (ref 20.5–51.1)
MAGNESIUM SERPL-MCNC: 1.9 MG/DL — SIGNIFICANT CHANGE UP (ref 1.8–2.4)
MCHC RBC-ENTMCNC: 26.8 PG — LOW (ref 27–31)
MCHC RBC-ENTMCNC: 30.3 G/DL — LOW (ref 32–37)
MCV RBC AUTO: 88.3 FL — SIGNIFICANT CHANGE UP (ref 81–99)
MONOCYTES # BLD AUTO: 0.41 K/UL — SIGNIFICANT CHANGE UP (ref 0.1–0.6)
MONOCYTES NFR BLD AUTO: 3.7 % — SIGNIFICANT CHANGE UP (ref 1.7–9.3)
NEUTROPHILS # BLD AUTO: 9.62 K/UL — HIGH (ref 1.4–6.5)
NEUTROPHILS NFR BLD AUTO: 85.7 % — HIGH (ref 42.2–75.2)
NRBC # BLD: 0 /100 WBCS — SIGNIFICANT CHANGE UP (ref 0–0)
PHOSPHATE SERPL-MCNC: 6.3 MG/DL — HIGH (ref 2.1–4.9)
PLATELET # BLD AUTO: 133 K/UL — SIGNIFICANT CHANGE UP (ref 130–400)
POTASSIUM SERPL-MCNC: 5 MMOL/L — SIGNIFICANT CHANGE UP (ref 3.5–5)
POTASSIUM SERPL-SCNC: 5 MMOL/L — SIGNIFICANT CHANGE UP (ref 3.5–5)
PROT SERPL-MCNC: 5.4 G/DL — LOW (ref 6–8)
RBC # BLD: 3.25 M/UL — LOW (ref 4.2–5.4)
RBC # FLD: 17.6 % — HIGH (ref 11.5–14.5)
SODIUM SERPL-SCNC: 140 MMOL/L — SIGNIFICANT CHANGE UP (ref 135–146)
TIBC SERPL-MCNC: 217 UG/DL — LOW (ref 220–430)
TYPE + AB SCN PNL BLD: SIGNIFICANT CHANGE UP
UIBC SERPL-MCNC: 156 UG/DL — SIGNIFICANT CHANGE UP (ref 110–370)
WBC # BLD: 11.21 K/UL — HIGH (ref 4.8–10.8)
WBC # FLD AUTO: 11.21 K/UL — HIGH (ref 4.8–10.8)

## 2018-11-28 RX ORDER — BUDESONIDE AND FORMOTEROL FUMARATE DIHYDRATE 160; 4.5 UG/1; UG/1
2 AEROSOL RESPIRATORY (INHALATION)
Qty: 0 | Refills: 0 | Status: DISCONTINUED | OUTPATIENT
Start: 2018-11-28 | End: 2018-12-04

## 2018-11-28 RX ORDER — FUROSEMIDE 40 MG
20 TABLET ORAL DAILY
Qty: 0 | Refills: 0 | Status: DISCONTINUED | OUTPATIENT
Start: 2018-11-28 | End: 2018-11-30

## 2018-11-28 RX ADMIN — INSULIN GLARGINE 15 UNIT(S): 100 INJECTION, SOLUTION SUBCUTANEOUS at 22:51

## 2018-11-28 RX ADMIN — AZITHROMYCIN 255 MILLIGRAM(S): 500 TABLET, FILM COATED ORAL at 22:48

## 2018-11-28 RX ADMIN — Medication 3 MILLILITER(S): at 06:33

## 2018-11-28 RX ADMIN — Medication 3 MILLILITER(S): at 20:37

## 2018-11-28 RX ADMIN — Medication 50 MICROGRAM(S): at 06:03

## 2018-11-28 RX ADMIN — Medication 650 MILLIGRAM(S): at 13:07

## 2018-11-28 RX ADMIN — HEPARIN SODIUM 5000 UNIT(S): 5000 INJECTION INTRAVENOUS; SUBCUTANEOUS at 22:48

## 2018-11-28 RX ADMIN — CALCITRIOL 0.25 MICROGRAM(S): 0.5 CAPSULE ORAL at 13:10

## 2018-11-28 RX ADMIN — Medication 5 UNIT(S): at 17:49

## 2018-11-28 RX ADMIN — Medication 3 MILLILITER(S): at 14:29

## 2018-11-28 RX ADMIN — HEPARIN SODIUM 5000 UNIT(S): 5000 INJECTION INTRAVENOUS; SUBCUTANEOUS at 13:08

## 2018-11-28 RX ADMIN — HEPARIN SODIUM 5000 UNIT(S): 5000 INJECTION INTRAVENOUS; SUBCUTANEOUS at 06:03

## 2018-11-28 RX ADMIN — Medication 5 UNIT(S): at 13:05

## 2018-11-28 RX ADMIN — Medication 60 MILLIGRAM(S): at 06:03

## 2018-11-28 RX ADMIN — BUDESONIDE AND FORMOTEROL FUMARATE DIHYDRATE 2 PUFF(S): 160; 4.5 AEROSOL RESPIRATORY (INHALATION) at 23:28

## 2018-11-28 RX ADMIN — Medication 3: at 13:06

## 2018-11-28 RX ADMIN — SERTRALINE 50 MILLIGRAM(S): 25 TABLET, FILM COATED ORAL at 13:07

## 2018-11-28 RX ADMIN — Medication 20 MILLIGRAM(S): at 14:59

## 2018-11-28 RX ADMIN — PANTOPRAZOLE SODIUM 40 MILLIGRAM(S): 20 TABLET, DELAYED RELEASE ORAL at 06:03

## 2018-11-28 RX ADMIN — Medication 81 MILLIGRAM(S): at 13:07

## 2018-11-28 RX ADMIN — ISOSORBIDE MONONITRATE 30 MILLIGRAM(S): 60 TABLET, EXTENDED RELEASE ORAL at 13:07

## 2018-11-28 RX ADMIN — Medication 650 MILLIGRAM(S): at 06:03

## 2018-11-28 RX ADMIN — Medication 2: at 17:50

## 2018-11-28 RX ADMIN — Medication 1: at 08:24

## 2018-11-28 RX ADMIN — CARVEDILOL PHOSPHATE 12.5 MILLIGRAM(S): 80 CAPSULE, EXTENDED RELEASE ORAL at 17:51

## 2018-11-28 RX ADMIN — Medication 650 MILLIGRAM(S): at 22:47

## 2018-11-28 RX ADMIN — CARVEDILOL PHOSPHATE 12.5 MILLIGRAM(S): 80 CAPSULE, EXTENDED RELEASE ORAL at 06:03

## 2018-11-28 RX ADMIN — Medication 5 UNIT(S): at 08:24

## 2018-11-28 RX ADMIN — Medication 60 MILLIGRAM(S): at 17:51

## 2018-11-28 NOTE — PROGRESS NOTE ADULT - ASSESSMENT
Patient with MARTIN on CKD presented to hospital for SOB, congestion and productive cough.  PMH HTN, DM, CKD (baseline cr. not available), COPD on home oxygen, CAD s/p CABG, DLD, PAD.    ·	 MARTIN on CKD 3 (baseline 2.5 from PMD )  ·	serum creat stable for now  ·	cont IVF for now / DC lasix please  ·	strict I and O   ·	continue sodium bicarbonate PO  ·	No need for RRT for now   ·	follow BMP, check UA and uprote /creat ratio  ·	check 2d echo    ·	 HTN   - BP noted, keep current     ·	 Anemia   - Hb noted check Fe studies will need KYMBERLY       ·	 PNA / Sepsis  - on Azithronycin     Will follow

## 2018-11-28 NOTE — PROGRESS NOTE ADULT - SUBJECTIVE AND OBJECTIVE BOX
Nephrology progress note    Patient is seen and examined, events over the last 24 h noted .    Allergies:  No Known Allergies    Hospital Medications:   MEDICATIONS  (STANDING):  ALBUTerol/ipratropium for Nebulization 3 milliLiter(s) Nebulizer every 6 hours  aspirin enteric coated 81 milliGRAM(s) Oral daily  azithromycin  IVPB 500 milliGRAM(s) IV Intermittent every 24 hours  buDESOnide 160 MICROgram(s)/formoterol 4.5 MICROgram(s) Inhaler 2 Puff(s) Inhalation two times a day  calcitriol   Capsule 0.25 MICROGram(s) Oral daily  carvedilol 12.5 milliGRAM(s) Oral every 12 hours  dextrose 5%. 1000 milliLiter(s) (50 mL/Hr) IV Continuous <Continuous>  dextrose 50% Injectable 12.5 Gram(s) IV Push once  dextrose 50% Injectable 25 Gram(s) IV Push once  dextrose 50% Injectable 25 Gram(s) IV Push once  furosemide    Tablet 20 milliGRAM(s) Oral daily  heparin  Injectable 5000 Unit(s) SubCutaneous every 8 hours  influenza   Vaccine 0.5 milliLiter(s) IntraMuscular once  insulin glargine Injectable (LANTUS) 15 Unit(s) SubCutaneous at bedtime  insulin lispro (HumaLOG) corrective regimen sliding scale   SubCutaneous three times a day before meals  insulin lispro Injectable (HumaLOG) 5 Unit(s) SubCutaneous three times a day before meals  isosorbide   mononitrate ER Tablet (IMDUR) 30 milliGRAM(s) Oral daily  levothyroxine 50 MICROGram(s) Oral daily  methylPREDNISolone sodium succinate Injectable 60 milliGRAM(s) IV Push every 12 hours  pantoprazole    Tablet 40 milliGRAM(s) Oral before breakfast  sertraline 50 milliGRAM(s) Oral daily  sodium bicarbonate 650 milliGRAM(s) Oral three times a day        VITALS:  T(F): 96.9 (11-28-18 @ 06:06), Max: 96.9 (11-28-18 @ 06:06)  HR: 61 (11-28-18 @ 06:06)  BP: 152/68 (11-28-18 @ 06:06)  RR: 18 (11-28-18 @ 06:06)  SpO2: 98% (11-27-18 @ 15:30)  Wt(kg): --    11-27 @ 07:01  -  11-28 @ 07:00  --------------------------------------------------------  IN: 0 mL / OUT: 200 mL / NET: -200 mL      Height (cm): 165.1 (11-27 @ 18:57)  Weight (kg): 72.6 (11-27 @ 18:57)  BMI (kg/m2): 26.6 (11-27 @ 18:57)  BSA (m2): 1.8 (11-27 @ 18:57)    PHYSICAL EXAM:  Constitutional: NAD  HEENT: anicteric sclera, oropharynx clear, MMM  Neck: No JVD  Respiratory: CTAB, no wheezes, rales or rhonchi  Cardiovascular: S1, S2, RRR  Gastrointestinal: BS+, soft, NT/ND  Extremities: No cyanosis or clubbing. No peripheral edema  :  No tam.   Skin: No rashes    LABS:  11-28    140  |  105  |  98<HH>  ----------------------------<  197<H>  5.0   |  20  |  5.4<HH>    Ca    9.1      28 Nov 2018 07:33  Phos  6.3     11-28  Mg     1.9     11-28    TPro  5.4<L>  /  Alb  2.7<L>  /  TBili  <0.2  /  DBili      /  AST  17  /  ALT  28  /  AlkPhos  138<H>  11-28                          8.7    11.21 )-----------( 133      ( 28 Nov 2018 07:33 )             28.7       Urine Studies:      RADIOLOGY & ADDITIONAL STUDIES: Nephrology progress note    Patient is seen and examined, events over the last 24 h noted .  denied any chest pain no SOB     Allergies:  No Known Allergies    Hospital Medications:   MEDICATIONS  (STANDING):  ALBUTerol/ipratropium for Nebulization 3 milliLiter(s) Nebulizer every 6 hours  aspirin enteric coated 81 milliGRAM(s) Oral daily  azithromycin  IVPB 500 milliGRAM(s) IV Intermittent every 24 hours  buDESOnide 160 MICROgram(s)/formoterol 4.5 MICROgram(s) Inhaler 2 Puff(s) Inhalation two times a day  calcitriol   Capsule 0.25 MICROGram(s) Oral daily  carvedilol 12.5 milliGRAM(s) Oral every 12 hours  furosemide    Tablet 20 milliGRAM(s) Oral daily  heparin  Injectable 5000 Unit(s) SubCutaneous every 8 hours  influenza   Vaccine 0.5 milliLiter(s) IntraMuscular once  insulin glargine Injectable (LANTUS) 15 Unit(s) SubCutaneous at bedtime  insulin lispro (HumaLOG) corrective regimen sliding scale   SubCutaneous three times a day before meals  insulin lispro Injectable (HumaLOG) 5 Unit(s) SubCutaneous three times a day before meals  isosorbide   mononitrate ER Tablet (IMDUR) 30 milliGRAM(s) Oral daily  levothyroxine 50 MICROGram(s) Oral daily  methylPREDNISolone sodium succinate Injectable 60 milliGRAM(s) IV Push every 12 hours  pantoprazole    Tablet 40 milliGRAM(s) Oral before breakfast  sertraline 50 milliGRAM(s) Oral daily  sodium bicarbonate 650 milliGRAM(s) Oral three times a day        VITALS:  T(F): 96.9 (11-28-18 @ 06:06), Max: 96.9 (11-28-18 @ 06:06)  HR: 61 (11-28-18 @ 06:06)  BP: 152/68 (11-28-18 @ 06:06)  RR: 18 (11-28-18 @ 06:06)  SpO2: 98% (11-27-18 @ 15:30)      11-27 @ 07:01  -  11-28 @ 07:00  --------------------------------------------------------  IN: 0 mL / OUT: 200 mL / NET: -200 mL      Height (cm): 165.1 (11-27 @ 18:57)  Weight (kg): 72.6 (11-27 @ 18:57)  BMI (kg/m2): 26.6 (11-27 @ 18:57)  BSA (m2): 1.8 (11-27 @ 18:57)    PHYSICAL EXAM:  Constitutional: NAD  HEENT: anicteric sclera, oropharynx clear, MMM  Neck: No JVD  Respiratory: CTAB, no wheezes, rales or rhonchi  Cardiovascular: S1, S2, RRR  Gastrointestinal: BS+, soft, NT/ND  Extremities: No cyanosis or clubbing. No peripheral edema  :  No tam.   Skin: No rashes    LABS:  11-28    140  |  105  |  98<HH>  ----------------------------<  197<H>  5.0   |  20  |  5.4<HH>  Creatinine Trend: 5.4<--, 5.4<--, 5.3<--, 4.9<--, 5.5<--	  Ca    9.1      28 Nov 2018 07:33  Phos  6.3     11-28  Mg     1.9     11-28    TPro  5.4<L>  /  Alb  2.7<L>  /  TBili  <0.2  /  DBili      /  AST  17  /  ALT  28  /  AlkPhos  138<H>  11-28                          8.7    11.21 )-----------( 133      ( 28 Nov 2018 07:33 )             28.7       Urine Studies:      RADIOLOGY & ADDITIONAL STUDIES:

## 2018-11-28 NOTE — PROGRESS NOTE ADULT - SUBJECTIVE AND OBJECTIVE BOX
ANA HINTON  77y Female    CHIEF COMPLAINT:    Patient is a 77y old  Female who presents with a chief complaint of difficulty breathing (27 Nov 2018 13:42)      INTERVAL HPI/OVERNIGHT EVENTS:    Patient seen and examined.    ROS: All other systems are negative.    Vital Signs:    T(F): 96.9 (11-28-18 @ 06:06), Max: 96.9 (11-28-18 @ 06:06)  HR: 61 (11-28-18 @ 06:06) (61 - 70)  BP: 152/68 (11-28-18 @ 06:06) (121/67 - 153/69)  RR: 18 (11-28-18 @ 06:06) (16 - 20)  SpO2: 98% (11-27-18 @ 15:30) (98% - 100%)  I&O's Summary    27 Nov 2018 07:01  -  28 Nov 2018 07:00  --------------------------------------------------------  IN: 0 mL / OUT: 200 mL / NET: -200 mL      Daily Height in cm: 165.1 (27 Nov 2018 18:57)    Daily   CAPILLARY BLOOD GLUCOSE      POCT Blood Glucose.: 251 mg/dL (27 Nov 2018 22:53)  POCT Blood Glucose.: 261 mg/dL (27 Nov 2018 21:24)  POCT Blood Glucose.: 273 mg/dL (27 Nov 2018 18:55)  POCT Blood Glucose.: 277 mg/dL (27 Nov 2018 17:53)  POCT Blood Glucose.: 228 mg/dL (27 Nov 2018 08:03)      PHYSICAL EXAM:    GENERAL:  NAD  SKIN: No rashes or lesions  HENT: Atrumatic. Normocephalic. PERRL. Moist membranes.  NECK: Supple, No JVD. No lymphadenopathy.  PULMONARY: CTA B/L. No wheezing. No rales  CVS: Normal S1, S2. Rate and Rythm are regular. No murmurs.  ABDOMEN/GI: Soft, Nontender, Nondistended; BS present  EXTREMITIES: Peripheral pulses intact. No edema B/L LE.  NEUROLOGIC:  No motor or sensory deficit.  PSYCH: Alert & oriented x 3    Consultant(s) Notes Reviewed:  [x ] YES  [ ] NO  Care Discussed with Consultants/Other Providers [ x] YES  [ ] NO    EKG reviewed  Telemetry reviewed    LABS:                        8.4    10.69 )-----------( 135      ( 27 Nov 2018 09:36 )             28.5     11-27    140  |  105  |  93<HH>  ----------------------------<  219<H>  5.2<H>   |  20  |  5.4<HH>    Ca    9.1      27 Nov 2018 09:36  Mg     1.9     11-27    TPro  5.3<L>  /  Alb  2.5<L>  /  TBili  <0.2  /  DBili  x   /  AST  20  /  ALT  28  /  AlkPhos  147<H>  11-27      Serum Pro-Brain Natriuretic Peptide: 33855 pg/mL (11-26-18 @ 01:00)    Trop <0.01, CKMB 2.9, CK 18, 11-27-18 @ 12:49      Culture - Blood (collected 26 Nov 2018 09:30)  Source: .Blood None  Preliminary Report (27 Nov 2018 17:02):    No growth to date.    Culture - Blood (collected 26 Nov 2018 01:00)  Source: .Blood None  Preliminary Report (27 Nov 2018 13:01):    No growth to date.        RADIOLOGY & ADDITIONAL TESTS:      Imaging or report Personally Reviewed:  [ ] YES  [ ] NO    Medications:  Standing  ALBUTerol/ipratropium for Nebulization 3 milliLiter(s) Nebulizer every 6 hours  aspirin enteric coated 81 milliGRAM(s) Oral daily  azithromycin  IVPB 500 milliGRAM(s) IV Intermittent every 24 hours  calcitriol   Capsule 0.25 MICROGram(s) Oral daily  carvedilol 12.5 milliGRAM(s) Oral every 12 hours  dextrose 5%. 1000 milliLiter(s) IV Continuous <Continuous>  dextrose 50% Injectable 12.5 Gram(s) IV Push once  dextrose 50% Injectable 25 Gram(s) IV Push once  dextrose 50% Injectable 25 Gram(s) IV Push once  heparin  Injectable 5000 Unit(s) SubCutaneous every 8 hours  influenza   Vaccine 0.5 milliLiter(s) IntraMuscular once  insulin glargine Injectable (LANTUS) 15 Unit(s) SubCutaneous at bedtime  insulin lispro (HumaLOG) corrective regimen sliding scale   SubCutaneous three times a day before meals  insulin lispro Injectable (HumaLOG) 5 Unit(s) SubCutaneous three times a day before meals  isosorbide   mononitrate ER Tablet (IMDUR) 30 milliGRAM(s) Oral daily  levothyroxine 50 MICROGram(s) Oral daily  methylPREDNISolone sodium succinate Injectable 60 milliGRAM(s) IV Push every 12 hours  pantoprazole    Tablet 40 milliGRAM(s) Oral before breakfast  sertraline 50 milliGRAM(s) Oral daily  sodium bicarbonate 650 milliGRAM(s) Oral three times a day  sodium chloride 0.9%. 1000 milliLiter(s) IV Continuous <Continuous>    PRN Meds  acetaminophen   Tablet .. 650 milliGRAM(s) Oral every 6 hours PRN  dextrose 40% Gel 15 Gram(s) Oral once PRN  glucagon  Injectable 1 milliGRAM(s) IntraMuscular once PRN  ondansetron Injectable 4 milliGRAM(s) IV Push every 6 hours PRN      Case discussed with resident    Care discussed with pt/family ANA HINTON  77y Female    CHIEF COMPLAINT:    Patient is a 77y old  Female who presents with a chief complaint of difficulty breathing (27 Nov 2018 13:42)      INTERVAL HPI/OVERNIGHT EVENTS:    Patient seen and examined. Still having sob and cough with greenish colored sputum. No fever. No cp    ROS: All other systems are negative.    Vital Signs:    T(F): 96.9 (11-28-18 @ 06:06), Max: 96.9 (11-28-18 @ 06:06)  HR: 61 (11-28-18 @ 06:06) (61 - 70)  BP: 152/68 (11-28-18 @ 06:06) (121/67 - 153/69)  RR: 18 (11-28-18 @ 06:06) (16 - 20)  SpO2: 98% (11-27-18 @ 15:30) (98% - 100%)  I&O's Summary    27 Nov 2018 07:01  -  28 Nov 2018 07:00  --------------------------------------------------------  IN: 0 mL / OUT: 200 mL / NET: -200 mL      Daily Height in cm: 165.1 (27 Nov 2018 18:57)    Daily   CAPILLARY BLOOD GLUCOSE      POCT Blood Glucose.: 251 mg/dL (27 Nov 2018 22:53)  POCT Blood Glucose.: 261 mg/dL (27 Nov 2018 21:24)  POCT Blood Glucose.: 273 mg/dL (27 Nov 2018 18:55)  POCT Blood Glucose.: 277 mg/dL (27 Nov 2018 17:53)  POCT Blood Glucose.: 228 mg/dL (27 Nov 2018 08:03)      PHYSICAL EXAM:    GENERAL:  NAD  SKIN: No rashes or lesions  HENT: Atrumatic. Normocephalic. PERRL. Moist membranes.  NECK: Supple, No JVD. No lymphadenopathy.  PULMONARY: CTA B/L. No wheezing. No rales  CVS: Normal S1, S2. Rate and Rythm are regular. No murmurs.  ABDOMEN/GI: Soft, Nontender, Nondistended; BS present  EXTREMITIES: Peripheral pulses intact. No edema B/L LE.  NEUROLOGIC:  No motor or sensory deficit.  PSYCH: Alert & oriented x 3    Consultant(s) Notes Reviewed:  [x ] YES  [ ] NO  Care Discussed with Consultants/Other Providers [ x] YES  [ ] NO    EKG reviewed  Telemetry reviewed    LABS:                        8.4    10.69 )-----------( 135      ( 27 Nov 2018 09:36 )             28.5     11-27    140  |  105  |  93<HH>  ----------------------------<  219<H>  5.2<H>   |  20  |  5.4<HH>    Ca    9.1      27 Nov 2018 09:36  Mg     1.9     11-27    TPro  5.3<L>  /  Alb  2.5<L>  /  TBili  <0.2  /  DBili  x   /  AST  20  /  ALT  28  /  AlkPhos  147<H>  11-27      Serum Pro-Brain Natriuretic Peptide: 71854 pg/mL (11-26-18 @ 01:00)    Trop <0.01, CKMB 2.9, CK 18, 11-27-18 @ 12:49      Culture - Blood (collected 26 Nov 2018 09:30)  Source: .Blood None  Preliminary Report (27 Nov 2018 17:02):    No growth to date.    Culture - Blood (collected 26 Nov 2018 01:00)  Source: .Blood None  Preliminary Report (27 Nov 2018 13:01):    No growth to date.        RADIOLOGY & ADDITIONAL TESTS:      Imaging or report Personally Reviewed:  [ ] YES  [ ] NO    Medications:  Standing  ALBUTerol/ipratropium for Nebulization 3 milliLiter(s) Nebulizer every 6 hours  aspirin enteric coated 81 milliGRAM(s) Oral daily  azithromycin  IVPB 500 milliGRAM(s) IV Intermittent every 24 hours  calcitriol   Capsule 0.25 MICROGram(s) Oral daily  carvedilol 12.5 milliGRAM(s) Oral every 12 hours  dextrose 5%. 1000 milliLiter(s) IV Continuous <Continuous>  dextrose 50% Injectable 12.5 Gram(s) IV Push once  dextrose 50% Injectable 25 Gram(s) IV Push once  dextrose 50% Injectable 25 Gram(s) IV Push once  heparin  Injectable 5000 Unit(s) SubCutaneous every 8 hours  influenza   Vaccine 0.5 milliLiter(s) IntraMuscular once  insulin glargine Injectable (LANTUS) 15 Unit(s) SubCutaneous at bedtime  insulin lispro (HumaLOG) corrective regimen sliding scale   SubCutaneous three times a day before meals  insulin lispro Injectable (HumaLOG) 5 Unit(s) SubCutaneous three times a day before meals  isosorbide   mononitrate ER Tablet (IMDUR) 30 milliGRAM(s) Oral daily  levothyroxine 50 MICROGram(s) Oral daily  methylPREDNISolone sodium succinate Injectable 60 milliGRAM(s) IV Push every 12 hours  pantoprazole    Tablet 40 milliGRAM(s) Oral before breakfast  sertraline 50 milliGRAM(s) Oral daily  sodium bicarbonate 650 milliGRAM(s) Oral three times a day  sodium chloride 0.9%. 1000 milliLiter(s) IV Continuous <Continuous>    PRN Meds  acetaminophen   Tablet .. 650 milliGRAM(s) Oral every 6 hours PRN  dextrose 40% Gel 15 Gram(s) Oral once PRN  glucagon  Injectable 1 milliGRAM(s) IntraMuscular once PRN  ondansetron Injectable 4 milliGRAM(s) IV Push every 6 hours PRN      Case discussed with resident    Care discussed with pt/family ANA HINTON  77y Female    CHIEF COMPLAINT:    Patient is a 77y old  Female who presents with a chief complaint of difficulty breathing (27 Nov 2018 13:42)      INTERVAL HPI/OVERNIGHT EVENTS:    Patient seen and examined. Still having sob and cough with greenish colored sputum. No fever. No cp    ROS: All other systems are negative.    Vital Signs:    T(F): 96.9 (11-28-18 @ 06:06), Max: 96.9 (11-28-18 @ 06:06)  HR: 61 (11-28-18 @ 06:06) (61 - 70)  BP: 152/68 (11-28-18 @ 06:06) (121/67 - 153/69)  RR: 18 (11-28-18 @ 06:06) (16 - 20)  SpO2: 98% (11-27-18 @ 15:30) (98% - 100%)  I&O's Summary    27 Nov 2018 07:01  -  28 Nov 2018 07:00  --------------------------------------------------------  IN: 0 mL / OUT: 200 mL / NET: -200 mL      Daily Height in cm: 165.1 (27 Nov 2018 18:57)    Daily   CAPILLARY BLOOD GLUCOSE      POCT Blood Glucose.: 251 mg/dL (27 Nov 2018 22:53)  POCT Blood Glucose.: 261 mg/dL (27 Nov 2018 21:24)  POCT Blood Glucose.: 273 mg/dL (27 Nov 2018 18:55)  POCT Blood Glucose.: 277 mg/dL (27 Nov 2018 17:53)  POCT Blood Glucose.: 228 mg/dL (27 Nov 2018 08:03)      PHYSICAL EXAM:    GENERAL:  NAD  SKIN: No rashes or lesions  HENT: Atrumatic. Normocephalic. PERRL. Moist membranes.  NECK: Supple, No JVD. No lymphadenopathy.  PULMONARY: B/L wheezing  CVS: Normal S1, S2. Rate and Rythm are regular. No murmurs.  ABDOMEN/GI: Soft, Nontender, Nondistended; BS present  EXTREMITIES: Peripheral pulses intact. No edema B/L LE.  NEUROLOGIC:  No motor or sensory deficit.  PSYCH: Alert & oriented x 3    Consultant(s) Notes Reviewed:  [x ] YES  [ ] NO  Care Discussed with Consultants/Other Providers [ x] YES  [ ] NO    EKG reviewed  Telemetry reviewed    LABS:                        8.4    10.69 )-----------( 135      ( 27 Nov 2018 09:36 )             28.5     11-27    140  |  105  |  93<HH>  ----------------------------<  219<H>  5.2<H>   |  20  |  5.4<HH>    Ca    9.1      27 Nov 2018 09:36  Mg     1.9     11-27    TPro  5.3<L>  /  Alb  2.5<L>  /  TBili  <0.2  /  DBili  x   /  AST  20  /  ALT  28  /  AlkPhos  147<H>  11-27      Serum Pro-Brain Natriuretic Peptide: 53188 pg/mL (11-26-18 @ 01:00)    Trop <0.01, CKMB 2.9, CK 18, 11-27-18 @ 12:49      Culture - Blood (collected 26 Nov 2018 09:30)  Source: .Blood None  Preliminary Report (27 Nov 2018 17:02):    No growth to date.    Culture - Blood (collected 26 Nov 2018 01:00)  Source: .Blood None  Preliminary Report (27 Nov 2018 13:01):    No growth to date.        RADIOLOGY & ADDITIONAL TESTS:      Imaging or report Personally Reviewed:  [ ] YES  [ ] NO    Medications:  Standing  ALBUTerol/ipratropium for Nebulization 3 milliLiter(s) Nebulizer every 6 hours  aspirin enteric coated 81 milliGRAM(s) Oral daily  azithromycin  IVPB 500 milliGRAM(s) IV Intermittent every 24 hours  calcitriol   Capsule 0.25 MICROGram(s) Oral daily  carvedilol 12.5 milliGRAM(s) Oral every 12 hours  dextrose 5%. 1000 milliLiter(s) IV Continuous <Continuous>  dextrose 50% Injectable 12.5 Gram(s) IV Push once  dextrose 50% Injectable 25 Gram(s) IV Push once  dextrose 50% Injectable 25 Gram(s) IV Push once  heparin  Injectable 5000 Unit(s) SubCutaneous every 8 hours  influenza   Vaccine 0.5 milliLiter(s) IntraMuscular once  insulin glargine Injectable (LANTUS) 15 Unit(s) SubCutaneous at bedtime  insulin lispro (HumaLOG) corrective regimen sliding scale   SubCutaneous three times a day before meals  insulin lispro Injectable (HumaLOG) 5 Unit(s) SubCutaneous three times a day before meals  isosorbide   mononitrate ER Tablet (IMDUR) 30 milliGRAM(s) Oral daily  levothyroxine 50 MICROGram(s) Oral daily  methylPREDNISolone sodium succinate Injectable 60 milliGRAM(s) IV Push every 12 hours  pantoprazole    Tablet 40 milliGRAM(s) Oral before breakfast  sertraline 50 milliGRAM(s) Oral daily  sodium bicarbonate 650 milliGRAM(s) Oral three times a day  sodium chloride 0.9%. 1000 milliLiter(s) IV Continuous <Continuous>    PRN Meds  acetaminophen   Tablet .. 650 milliGRAM(s) Oral every 6 hours PRN  dextrose 40% Gel 15 Gram(s) Oral once PRN  glucagon  Injectable 1 milliGRAM(s) IntraMuscular once PRN  ondansetron Injectable 4 milliGRAM(s) IV Push every 6 hours PRN      Case discussed with resident    Care discussed with pt/family

## 2018-11-28 NOTE — PROGRESS NOTE ADULT - SUBJECTIVE AND OBJECTIVE BOX
OVERNIGHT EVENTS: still c/o cough/ wheezing/ sob    Vital Signs Last 24 Hrs  T(C): 35.9 (28 Nov 2018 13:05), Max: 36.1 (28 Nov 2018 06:06)  T(F): 96.7 (28 Nov 2018 13:05), Max: 96.9 (28 Nov 2018 06:06)  HR: 70 (28 Nov 2018 13:05) (61 - 70)  BP: 143/77 (28 Nov 2018 13:05) (143/77 - 153/69)  BP(mean): --  RR: 18 (28 Nov 2018 13:05) (18 - 18)  SpO2: 93% (28 Nov 2018 12:38) (93% - 93%)    PHYSICAL EXAMINATION:    GENERAL: The patient is awake and alert in no apparent distress.     HEENT: Head is normocephalic and atraumatic. Extraocular muscles are intact. Mucous membranes are moist.    NECK: Supple.    LUNGS: diffuse wheezing    HEART: Regular rate and rhythm without murmur.    ABDOMEN: Soft, nontender, and nondistended.      EXTREMITIES: Without any cyanosis, clubbing, rash, lesions or edema.    NEUROLOGIC: Grossly intact.    SKIN: No ulceration or induration present.      LABS:                        8.7    11.21 )-----------( 133      ( 28 Nov 2018 07:33 )             28.7     11-28    140  |  105  |  98<HH>  ----------------------------<  197<H>  5.0   |  20  |  5.4<HH>    Ca    9.1      28 Nov 2018 07:33  Phos  6.3     11-28  Mg     1.9     11-28    TPro  5.4<L>  /  Alb  2.7<L>  /  TBili  <0.2  /  DBili  x   /  AST  17  /  ALT  28  /  AlkPhos  138<H>  11-28          CARDIAC MARKERS ( 27 Nov 2018 12:49 )  x     / <0.01 ng/mL / 18 U/L / x     / 2.9 ng/mL        Serum Pro-Brain Natriuretic Peptide: 92247 pg/mL (11-26-18 @ 01:00)            11-27-18 @ 07:01  -  11-28-18 @ 07:00  --------------------------------------------------------  IN: 0 mL / OUT: 200 mL / NET: -200 mL    11-28-18 @ 07:01  -  11-28-18 @ 16:01  --------------------------------------------------------  IN: 720 mL / OUT: 300 mL / NET: 420 mL        MICROBIOLOGY:  Culture Results:   No growth to date. (11-26 @ 09:30)  Culture Results:   No growth to date. (11-26 @ 01:00)      MEDICATIONS  (STANDING):  ALBUTerol/ipratropium for Nebulization 3 milliLiter(s) Nebulizer every 6 hours  aspirin enteric coated 81 milliGRAM(s) Oral daily  azithromycin  IVPB 500 milliGRAM(s) IV Intermittent every 24 hours  buDESOnide 160 MICROgram(s)/formoterol 4.5 MICROgram(s) Inhaler 2 Puff(s) Inhalation two times a day  calcitriol   Capsule 0.25 MICROGram(s) Oral daily  carvedilol 12.5 milliGRAM(s) Oral every 12 hours  dextrose 5%. 1000 milliLiter(s) (50 mL/Hr) IV Continuous <Continuous>  dextrose 50% Injectable 12.5 Gram(s) IV Push once  dextrose 50% Injectable 25 Gram(s) IV Push once  dextrose 50% Injectable 25 Gram(s) IV Push once  furosemide    Tablet 20 milliGRAM(s) Oral daily  heparin  Injectable 5000 Unit(s) SubCutaneous every 8 hours  influenza   Vaccine 0.5 milliLiter(s) IntraMuscular once  insulin glargine Injectable (LANTUS) 15 Unit(s) SubCutaneous at bedtime  insulin lispro (HumaLOG) corrective regimen sliding scale   SubCutaneous three times a day before meals  insulin lispro Injectable (HumaLOG) 5 Unit(s) SubCutaneous three times a day before meals  isosorbide   mononitrate ER Tablet (IMDUR) 30 milliGRAM(s) Oral daily  levothyroxine 50 MICROGram(s) Oral daily  methylPREDNISolone sodium succinate Injectable 60 milliGRAM(s) IV Push every 12 hours  pantoprazole    Tablet 40 milliGRAM(s) Oral before breakfast  sertraline 50 milliGRAM(s) Oral daily  sodium bicarbonate 650 milliGRAM(s) Oral three times a day    MEDICATIONS  (PRN):  acetaminophen   Tablet .. 650 milliGRAM(s) Oral every 6 hours PRN Moderate Pain (4 - 6)  dextrose 40% Gel 15 Gram(s) Oral once PRN Blood Glucose LESS THAN 70 milliGRAM(s)/deciliter  glucagon  Injectable 1 milliGRAM(s) IntraMuscular once PRN Glucose LESS THAN 70 milligrams/deciliter  ondansetron Injectable 4 milliGRAM(s) IV Push every 6 hours PRN Nausea and/or Vomiting      RADIOLOGY & ADDITIONAL STUDIES:

## 2018-11-28 NOTE — PROGRESS NOTE ADULT - ASSESSMENT
ANA HINTON 77y Female  MRN#: [id number]   CODE STATUS: Full code    SUBJECTIVE  Patient is a 77y old Female who presents with a chief complaint of shortness of breath and productive cough (28 Nov 2018 07:21)  Currently admitted to medicine with the primary diagnosis of acute exacerbation of COPD  Today is hospital day 3d, and this morning she is resting in bed with 2L NC and reports no overnight events. Patient states that her breathing seems to be worse than before, with frequent coughs and congestion. Patient denies any chest pain, abdominal pain, fever.chills, or any urinary symptoms.       OBJECTIVE  PAST MEDICAL & SURGICAL HISTORY  Dyslipidemia  Coronary artery disease  Diabetes mellitus  COPD (chronic obstructive pulmonary disease)  Dyslipidemia  Chronic kidney disease  Hypertension  H/O hernia repair  S/P CABG (coronary artery bypass graft)    ALLERGIES:  No Known Allergies    MEDICATIONS:  STANDING MEDICATIONS  ALBUTerol/ipratropium for Nebulization 3 milliLiter(s) Nebulizer every 6 hours  aspirin enteric coated 81 milliGRAM(s) Oral daily  azithromycin  IVPB 500 milliGRAM(s) IV Intermittent every 24 hours  buDESOnide 160 MICROgram(s)/formoterol 4.5 MICROgram(s) Inhaler 2 Puff(s) Inhalation two times a day  calcitriol   Capsule 0.25 MICROGram(s) Oral daily  carvedilol 12.5 milliGRAM(s) Oral every 12 hours  dextrose 5%. 1000 milliLiter(s) IV Continuous <Continuous>  dextrose 50% Injectable 12.5 Gram(s) IV Push once  dextrose 50% Injectable 25 Gram(s) IV Push once  dextrose 50% Injectable 25 Gram(s) IV Push once  furosemide    Tablet 20 milliGRAM(s) Oral daily  heparin  Injectable 5000 Unit(s) SubCutaneous every 8 hours  influenza   Vaccine 0.5 milliLiter(s) IntraMuscular once  insulin glargine Injectable (LANTUS) 15 Unit(s) SubCutaneous at bedtime  insulin lispro (HumaLOG) corrective regimen sliding scale   SubCutaneous three times a day before meals  insulin lispro Injectable (HumaLOG) 5 Unit(s) SubCutaneous three times a day before meals  isosorbide   mononitrate ER Tablet (IMDUR) 30 milliGRAM(s) Oral daily  levothyroxine 50 MICROGram(s) Oral daily  methylPREDNISolone sodium succinate Injectable 60 milliGRAM(s) IV Push every 12 hours  pantoprazole    Tablet 40 milliGRAM(s) Oral before breakfast  sertraline 50 milliGRAM(s) Oral daily  sodium bicarbonate 650 milliGRAM(s) Oral three times a day    PRN MEDICATIONS  acetaminophen   Tablet .. 650 milliGRAM(s) Oral every 6 hours PRN  dextrose 40% Gel 15 Gram(s) Oral once PRN  glucagon  Injectable 1 milliGRAM(s) IntraMuscular once PRN  ondansetron Injectable 4 milliGRAM(s) IV Push every 6 hours PRN      VITAL SIGNS: Last 24 Hours  T(C): 36.1 (28 Nov 2018 06:06), Max: 36.1 (28 Nov 2018 06:06)  T(F): 96.9 (28 Nov 2018 06:06), Max: 96.9 (28 Nov 2018 06:06)  HR: 61 (28 Nov 2018 06:06) (61 - 70)  BP: 152/68 (28 Nov 2018 06:06) (135/61 - 153/69)  BP(mean): --  RR: 18 (28 Nov 2018 06:06) (16 - 18)  SpO2: 98% (27 Nov 2018 15:30) (98% - 98%)    LABS:                        8.7    11.21 )-----------( 133      ( 28 Nov 2018 07:33 )             28.7     11-28    140  |  105  |  98<HH>  ----------------------------<  197<H>  5.0   |  20  |  5.4<HH>    Ca    9.1      28 Nov 2018 07:33  Phos  6.3     11-28  Mg     1.9     11-28    TPro  5.4<L>  /  Alb  2.7<L>  /  TBili  <0.2  /  DBili  x   /  AST  17  /  ALT  28  /  AlkPhos  138<H>  11-28    Creatine Kinase, Serum: 18 U/L (11-27-18 @ 12:49)  Troponin T, Serum: <0.01 ng/mL (11-27-18 @ 12:49)      Culture - Blood (collected 26 Nov 2018 09:30)  Source: .Blood None  Preliminary Report (27 Nov 2018 17:02):    No growth to date.    Culture - Blood (collected 26 Nov 2018 01:00)  Source: .Blood None  Preliminary Report (27 Nov 2018 13:01):    No growth to date.      CARDIAC MARKERS ( 27 Nov 2018 12:49 )  x     / <0.01 ng/mL / 18 U/L / x     / 2.9 ng/mL      RADIOLOGY:  < from: Xray Chest 1 View- PORTABLE-Urgent (11.27.18 @ 07:54) >  Diminishing interstitial airspace opacities.    < end of copied text >    < from: US Kidney and Bladder (11.26.18 @ 09:40) >  No hydronephrosis or stone in either kidney.    Bilateral renal simple cysts.    < end of copied text >      PHYSICAL EXAM:    GENERAL: well-developed, in mild respiratory distress, having trouble catching breathes when speaking  HEENT:  Atraumatic, Normocephalic. conjunctiva and sclera clear, No JVD  PULMONARY: Bilateral diffused expiratory rhonchi   CARDIOVASCULAR: Regular rate and rhythm; No murmurs  GASTROINTESTINAL: Soft, Nontender, Nondistended; Bowel sounds present  MUSCULOSKELETAL: No petal edema  NEUROLOGY: non-focal  SKIN: No rashes or lesions      ADMISSION SUMMARY  77F with PMHx COPD on home oxygen, HTN, HLD, DM, history of bypass, CKD not on dialysis presents for shortness of breath, congestion, productive cough x 4 days.    #) AHRF 2/2 COPD Exacerbation 2/2 viral infection?  - History of COPD with home O2 2L  - Continue IV solumedrol 60mg BID, now day 3  - Continue azithromycin   - Q6 nebulizers  - Start Symbicort 160mg   - flu and RVP panel negative  - Blood cultures negative x2  - check echo to rule out any cardiac causes  - Continue bipap HS and PRN  - Pulmonary consulted for optimizing medical treatment    #) Questionable MARTIN on CKD stage V  - BNP 84091, likely caused by volume overload 2/2 CKD  - Unknown baseline, Cr ~5.5 since admission  - Nephro recs appreciated: Call PMD at [place] for baseline Cr, check Fe studies, IP, and PTH, start sodium bicarb 650mg PO q8hrs  - hold nephrotoxic agents  - Daily BMP    #) Normocytic Anemia  - Etiologies include anemia of chronic disease (CKD) vs TEDDY vs hemolysis  - pending iron studies, will check haptoglobin, LDH, reticulocyte, bilirubin    #) HTN  - Continue home meds: carvedilol, imdur  - Hold enalapril due to elevated Cr with unknown baseline    #) DM  - Continue insulin basal + bolus  - Monitor fingersticks, will adjust if FS persistently elevated      #CAD s/p CABG  - c/w aspirin, imdur and Coreg      DVT ppx: heparin subQ  GI ppx: protonix  Diet: DASH/ TLC  Activity: out of bed to chair + ambulate as tolerated  Code: Full code  Dispo: acute, pending pulmonary consult ANA HINTON 77y Female  MRN#: [id number]   CODE STATUS: Full code    SUBJECTIVE  Patient is a 77y old Female who presents with a chief complaint of shortness of breath and productive cough (28 Nov 2018 07:21)  Currently admitted to medicine with the primary diagnosis of acute exacerbation of COPD  Today is hospital day 3d, and this morning she is resting in bed with 2L NC and reports no overnight events. Patient states that her breathing seems to be worse than before, with frequent coughs and congestion. Patient denies any chest pain, abdominal pain, fever.chills, or any urinary symptoms.       OBJECTIVE  PAST MEDICAL & SURGICAL HISTORY  Dyslipidemia  Coronary artery disease  Diabetes mellitus  COPD (chronic obstructive pulmonary disease)  Dyslipidemia  Chronic kidney disease  Hypertension  H/O hernia repair  S/P CABG (coronary artery bypass graft)    ALLERGIES:  No Known Allergies    MEDICATIONS:  STANDING MEDICATIONS  ALBUTerol/ipratropium for Nebulization 3 milliLiter(s) Nebulizer every 6 hours  aspirin enteric coated 81 milliGRAM(s) Oral daily  azithromycin  IVPB 500 milliGRAM(s) IV Intermittent every 24 hours  buDESOnide 160 MICROgram(s)/formoterol 4.5 MICROgram(s) Inhaler 2 Puff(s) Inhalation two times a day  calcitriol   Capsule 0.25 MICROGram(s) Oral daily  carvedilol 12.5 milliGRAM(s) Oral every 12 hours  dextrose 5%. 1000 milliLiter(s) IV Continuous <Continuous>  dextrose 50% Injectable 12.5 Gram(s) IV Push once  dextrose 50% Injectable 25 Gram(s) IV Push once  dextrose 50% Injectable 25 Gram(s) IV Push once  furosemide    Tablet 20 milliGRAM(s) Oral daily  heparin  Injectable 5000 Unit(s) SubCutaneous every 8 hours  influenza   Vaccine 0.5 milliLiter(s) IntraMuscular once  insulin glargine Injectable (LANTUS) 15 Unit(s) SubCutaneous at bedtime  insulin lispro (HumaLOG) corrective regimen sliding scale   SubCutaneous three times a day before meals  insulin lispro Injectable (HumaLOG) 5 Unit(s) SubCutaneous three times a day before meals  isosorbide   mononitrate ER Tablet (IMDUR) 30 milliGRAM(s) Oral daily  levothyroxine 50 MICROGram(s) Oral daily  methylPREDNISolone sodium succinate Injectable 60 milliGRAM(s) IV Push every 12 hours  pantoprazole    Tablet 40 milliGRAM(s) Oral before breakfast  sertraline 50 milliGRAM(s) Oral daily  sodium bicarbonate 650 milliGRAM(s) Oral three times a day    PRN MEDICATIONS  acetaminophen   Tablet .. 650 milliGRAM(s) Oral every 6 hours PRN  dextrose 40% Gel 15 Gram(s) Oral once PRN  glucagon  Injectable 1 milliGRAM(s) IntraMuscular once PRN  ondansetron Injectable 4 milliGRAM(s) IV Push every 6 hours PRN      VITAL SIGNS: Last 24 Hours  T(C): 36.1 (28 Nov 2018 06:06), Max: 36.1 (28 Nov 2018 06:06)  T(F): 96.9 (28 Nov 2018 06:06), Max: 96.9 (28 Nov 2018 06:06)  HR: 61 (28 Nov 2018 06:06) (61 - 70)  BP: 152/68 (28 Nov 2018 06:06) (135/61 - 153/69)  BP(mean): --  RR: 18 (28 Nov 2018 06:06) (16 - 18)  SpO2: 98% (27 Nov 2018 15:30) (98% - 98%)    LABS:                        8.7    11.21 )-----------( 133      ( 28 Nov 2018 07:33 )             28.7     11-28    140  |  105  |  98<HH>  ----------------------------<  197<H>  5.0   |  20  |  5.4<HH>    Ca    9.1      28 Nov 2018 07:33  Phos  6.3     11-28  Mg     1.9     11-28    TPro  5.4<L>  /  Alb  2.7<L>  /  TBili  <0.2  /  DBili  x   /  AST  17  /  ALT  28  /  AlkPhos  138<H>  11-28    Creatine Kinase, Serum: 18 U/L (11-27-18 @ 12:49)  Troponin T, Serum: <0.01 ng/mL (11-27-18 @ 12:49)      Culture - Blood (collected 26 Nov 2018 09:30)  Source: .Blood None  Preliminary Report (27 Nov 2018 17:02):    No growth to date.    Culture - Blood (collected 26 Nov 2018 01:00)  Source: .Blood None  Preliminary Report (27 Nov 2018 13:01):    No growth to date.      CARDIAC MARKERS ( 27 Nov 2018 12:49 )  x     / <0.01 ng/mL / 18 U/L / x     / 2.9 ng/mL      RADIOLOGY:  < from: Xray Chest 1 View- PORTABLE-Urgent (11.27.18 @ 07:54) >  Diminishing interstitial airspace opacities.    < end of copied text >    < from: US Kidney and Bladder (11.26.18 @ 09:40) >  No hydronephrosis or stone in either kidney.    Bilateral renal simple cysts.    < end of copied text >      PHYSICAL EXAM:    GENERAL: well-developed, in mild respiratory distress, having trouble catching breathes when speaking  HEENT:  Atraumatic, Normocephalic. conjunctiva and sclera clear, No JVD  PULMONARY: Bilateral diffused expiratory rhonchi   CARDIOVASCULAR: Regular rate and rhythm; No murmurs  GASTROINTESTINAL: Soft, Nontender, Nondistended; Bowel sounds present  MUSCULOSKELETAL: No petal edema  NEUROLOGY: non-focal  SKIN: No rashes or lesions      ADMISSION SUMMARY  77F with PMHx COPD on home oxygen, HTN, HLD, DM, history of bypass, CKD not on dialysis presents for shortness of breath, congestion, productive cough x 4 days.    #) AHRF 2/2 COPD Exacerbation 2/2 viral infection?  - History of COPD with home O2 2L  - Continue IV solumedrol 60mg BID, now day 3  - Continue azithromycin   - Q6 nebulizers  - Start Symbicort 160mg   - flu and RVP panel negative  - Blood cultures negative x2  - check echo to rule out any cardiac causes  - Continue bipap HS and PRN  - Pulmonary consulted for optimizing medical treatment    #) Questionable MARTIN on CKD stage V  - BNP 12180, likely caused by volume overload 2/2 CKD  - Unknown baseline, Cr ~5.5 since admission  - Nephro recs appreciated: Call PMD at [place] for baseline Cr, check Fe studies, IP, and PTH, start sodium bicarb 650mg PO q8hrs  - hold nephrotoxic agents  - Daily BMP    #) Normocytic Anemia  - Etiologies include anemia of chronic disease (CKD) vs TEDDY vs hemolysis  - pending iron studies, will check haptoglobin, LDH, reticulocyte, bilirubin    #) HTN  - Continue home meds: carvedilol, imdur  - Hold enalapril due to elevated Cr with unknown baseline    #) DM  - Continue insulin basal + bolus  - Monitor fingersticks, will adjust if FS persistently elevated    #) CAD s/p CABG  - c/w aspirin, imdur and Coreg      DVT ppx: heparin subQ  GI ppx: protonix  Diet: DASH/ TLC  Activity: out of bed to chair + ambulate as tolerated  Code: Full code  Dispo: acute

## 2018-11-28 NOTE — PROGRESS NOTE ADULT - ASSESSMENT
76 yo female with PMHx COPD on home oxygen, HTN, HLD, DM, history of bypass, CKD not on dialysis presents for shortness of breath, congestion, productive cough x 4 days.    1.	COPD exacerbation  2.	CKD-V  3.	DM-2  4.	HTN/DL 78 yo female with PMHx COPD on home oxygen, HTN, HLD, DM, history of bypass, CKD not on dialysis presents for shortness of breath, congestion, productive cough x 4 days.    1.	COPD exacerbation  2.	CKD-V  3.	DM-2  4.	HTN/DL  5.	Hyperkalemia         PLAN:    ·	Cont Nebs and Solumedrol   ·	Add Symbicort twice a day  ·	Cont Zinthromax  ·	Pulm eval noted  ·	Monitor FS. Cont Insulin  ·	Follow electrolytes.  ·	Plan of care D/W the pt and her daughter on bedside.

## 2018-11-28 NOTE — PROGRESS NOTE ADULT - ASSESSMENT
IMPRESSION:    Acute hypercapnic respiratory failure/ COPD exacerbation  multiple co morbidities    PLAN:    - keep IV steroids   - dc rocephin  - BIPAP ar nite and as needed  - neb q 6 h  - DVT prophylaxis  - poor prognosis

## 2018-11-29 LAB
ALBUMIN SERPL ELPH-MCNC: 2.6 G/DL — LOW (ref 3.5–5.2)
ALP SERPL-CCNC: 117 U/L — HIGH (ref 30–115)
ALT FLD-CCNC: 25 U/L — SIGNIFICANT CHANGE UP (ref 0–41)
ANION GAP SERPL CALC-SCNC: 15 MMOL/L — HIGH (ref 7–14)
AST SERPL-CCNC: 16 U/L — SIGNIFICANT CHANGE UP (ref 0–41)
BILIRUB SERPL-MCNC: <0.2 MG/DL — SIGNIFICANT CHANGE UP (ref 0.2–1.2)
BUN SERPL-MCNC: 105 MG/DL — CRITICAL HIGH (ref 10–20)
CALCIUM SERPL-MCNC: 9 MG/DL — SIGNIFICANT CHANGE UP (ref 8.4–10.5)
CALCIUM SERPL-MCNC: 9.1 MG/DL — SIGNIFICANT CHANGE UP (ref 8.5–10.1)
CHLORIDE SERPL-SCNC: 104 MMOL/L — SIGNIFICANT CHANGE UP (ref 98–110)
CO2 SERPL-SCNC: 21 MMOL/L — SIGNIFICANT CHANGE UP (ref 17–32)
CREAT SERPL-MCNC: 5.5 MG/DL — CRITICAL HIGH (ref 0.7–1.5)
FERRITIN SERPL-MCNC: 198 NG/ML — HIGH (ref 15–150)
GLUCOSE BLDC GLUCOMTR-MCNC: 198 MG/DL — HIGH (ref 70–99)
GLUCOSE BLDC GLUCOMTR-MCNC: 256 MG/DL — HIGH (ref 70–99)
GLUCOSE BLDC GLUCOMTR-MCNC: 281 MG/DL — HIGH (ref 70–99)
GLUCOSE BLDC GLUCOMTR-MCNC: 366 MG/DL — HIGH (ref 70–99)
GLUCOSE SERPL-MCNC: 248 MG/DL — HIGH (ref 70–99)
HCT VFR BLD CALC: 27.6 % — LOW (ref 37–47)
HGB BLD-MCNC: 8.4 G/DL — LOW (ref 12–16)
LDH SERPL L TO P-CCNC: 193 — SIGNIFICANT CHANGE UP (ref 50–242)
MAGNESIUM SERPL-MCNC: 1.8 MG/DL — SIGNIFICANT CHANGE UP (ref 1.8–2.4)
MCHC RBC-ENTMCNC: 26.6 PG — LOW (ref 27–31)
MCHC RBC-ENTMCNC: 30.4 G/DL — LOW (ref 32–37)
MCV RBC AUTO: 87.3 FL — SIGNIFICANT CHANGE UP (ref 81–99)
NRBC # BLD: 0 /100 WBCS — SIGNIFICANT CHANGE UP (ref 0–0)
PLATELET # BLD AUTO: 148 K/UL — SIGNIFICANT CHANGE UP (ref 130–400)
POTASSIUM SERPL-MCNC: 5.3 MMOL/L — HIGH (ref 3.5–5)
POTASSIUM SERPL-SCNC: 5.3 MMOL/L — HIGH (ref 3.5–5)
PROT SERPL-MCNC: 5.1 G/DL — LOW (ref 6–8)
PTH-INTACT FLD-MCNC: 114 PG/ML — HIGH (ref 15–65)
RBC # BLD: 3.16 M/UL — LOW (ref 4.2–5.4)
RBC # BLD: 3.16 M/UL — LOW (ref 4.2–5.4)
RBC # FLD: 17.7 % — HIGH (ref 11.5–14.5)
RETICS #: 20.9 K/UL — LOW (ref 25–125)
RETICS/RBC NFR: 0.7 % — SIGNIFICANT CHANGE UP (ref 0.5–1.5)
SODIUM SERPL-SCNC: 140 MMOL/L — SIGNIFICANT CHANGE UP (ref 135–146)
WBC # BLD: 13.53 K/UL — HIGH (ref 4.8–10.8)
WBC # FLD AUTO: 13.53 K/UL — HIGH (ref 4.8–10.8)

## 2018-11-29 RX ORDER — IPRATROPIUM/ALBUTEROL SULFATE 18-103MCG
3 AEROSOL WITH ADAPTER (GRAM) INHALATION EVERY 4 HOURS
Qty: 0 | Refills: 0 | Status: DISCONTINUED | OUTPATIENT
Start: 2018-11-29 | End: 2018-12-04

## 2018-11-29 RX ORDER — SEVELAMER CARBONATE 2400 MG/1
800 POWDER, FOR SUSPENSION ORAL EVERY 8 HOURS
Qty: 0 | Refills: 0 | Status: DISCONTINUED | OUTPATIENT
Start: 2018-11-29 | End: 2018-12-04

## 2018-11-29 RX ADMIN — Medication 3 MILLILITER(S): at 13:52

## 2018-11-29 RX ADMIN — SEVELAMER CARBONATE 800 MILLIGRAM(S): 2400 POWDER, FOR SUSPENSION ORAL at 22:04

## 2018-11-29 RX ADMIN — CALCITRIOL 0.25 MICROGRAM(S): 0.5 CAPSULE ORAL at 16:08

## 2018-11-29 RX ADMIN — Medication 5 UNIT(S): at 12:17

## 2018-11-29 RX ADMIN — Medication 650 MILLIGRAM(S): at 05:10

## 2018-11-29 RX ADMIN — Medication 81 MILLIGRAM(S): at 12:20

## 2018-11-29 RX ADMIN — Medication 60 MILLIGRAM(S): at 05:11

## 2018-11-29 RX ADMIN — HEPARIN SODIUM 5000 UNIT(S): 5000 INJECTION INTRAVENOUS; SUBCUTANEOUS at 16:09

## 2018-11-29 RX ADMIN — Medication 60 MILLIGRAM(S): at 22:03

## 2018-11-29 RX ADMIN — BUDESONIDE AND FORMOTEROL FUMARATE DIHYDRATE 2 PUFF(S): 160; 4.5 AEROSOL RESPIRATORY (INHALATION) at 08:00

## 2018-11-29 RX ADMIN — Medication 650 MILLIGRAM(S): at 16:10

## 2018-11-29 RX ADMIN — Medication 3 MILLILITER(S): at 19:43

## 2018-11-29 RX ADMIN — Medication 600 MILLIGRAM(S): at 17:47

## 2018-11-29 RX ADMIN — Medication 5 UNIT(S): at 16:52

## 2018-11-29 RX ADMIN — Medication 50 MICROGRAM(S): at 05:10

## 2018-11-29 RX ADMIN — PANTOPRAZOLE SODIUM 40 MILLIGRAM(S): 20 TABLET, DELAYED RELEASE ORAL at 05:10

## 2018-11-29 RX ADMIN — Medication 3 MILLILITER(S): at 08:18

## 2018-11-29 RX ADMIN — Medication 5 UNIT(S): at 08:00

## 2018-11-29 RX ADMIN — HEPARIN SODIUM 5000 UNIT(S): 5000 INJECTION INTRAVENOUS; SUBCUTANEOUS at 22:03

## 2018-11-29 RX ADMIN — ISOSORBIDE MONONITRATE 30 MILLIGRAM(S): 60 TABLET, EXTENDED RELEASE ORAL at 12:20

## 2018-11-29 RX ADMIN — Medication 5: at 12:17

## 2018-11-29 RX ADMIN — Medication 3 MILLILITER(S): at 15:21

## 2018-11-29 RX ADMIN — SERTRALINE 50 MILLIGRAM(S): 25 TABLET, FILM COATED ORAL at 12:20

## 2018-11-29 RX ADMIN — CARVEDILOL PHOSPHATE 12.5 MILLIGRAM(S): 80 CAPSULE, EXTENDED RELEASE ORAL at 05:10

## 2018-11-29 RX ADMIN — INSULIN GLARGINE 15 UNIT(S): 100 INJECTION, SOLUTION SUBCUTANEOUS at 22:04

## 2018-11-29 RX ADMIN — HEPARIN SODIUM 5000 UNIT(S): 5000 INJECTION INTRAVENOUS; SUBCUTANEOUS at 05:10

## 2018-11-29 RX ADMIN — Medication 600 MILLIGRAM(S): at 12:19

## 2018-11-29 RX ADMIN — Medication 5: at 08:00

## 2018-11-29 RX ADMIN — Medication 3: at 16:52

## 2018-11-29 RX ADMIN — Medication 650 MILLIGRAM(S): at 22:04

## 2018-11-29 RX ADMIN — Medication 60 MILLIGRAM(S): at 16:09

## 2018-11-29 RX ADMIN — CARVEDILOL PHOSPHATE 12.5 MILLIGRAM(S): 80 CAPSULE, EXTENDED RELEASE ORAL at 17:47

## 2018-11-29 RX ADMIN — Medication 20 MILLIGRAM(S): at 05:10

## 2018-11-29 NOTE — PROGRESS NOTE ADULT - ASSESSMENT
IMPRESSION:    Acute hypercapnic respiratory failure/ COPD exacerbation  multiple co morbidities    PLAN:    - keep IV steroids   - repeat CXR  - dc rocephin  - BIPAP ar nite and as needed  - neb q 6 h  - DVT prophylaxis  - poor prognosis  - DNR/ DNI

## 2018-11-29 NOTE — PROGRESS NOTE ADULT - ASSESSMENT
respiratory failure/copd/? MARTIN/stage 5 ckd :  # creatinine stable  # non oliguric  # sono : no hydro  # check bladder scan for PVR  # lasix 20 with cr 5.4 has  minimal effect if any, d/c lasix  # no IV fluids  # ph noted, start renagel 2 tablets q 8   # corrected calcium around 10, d/c calcitriol  # if BP remain elevated, start norvasc 5   # continue sodium bicarbonate  # check ferritin, will start ESAnce BP better controlled  # no indication for RRT  # will follow

## 2018-11-29 NOTE — PROGRESS NOTE ADULT - ASSESSMENT
ANA HINTON 77y Female  MRN#: [id number]   CODE STATUS: Full code    SUBJECTIVE  Patient is a 77y old Female who presented with a chief complaint of shortness of breath and productive cough  Currently admitted to medicine with the primary diagnosis of acute exacerbation of COPD  Today is hospital day 4d, and this morning she is resting in bed with 2L NC and reports no overnight events. Patient states that her breathing has not improved, with frequent coughs and congestion. Patient is able to speak in full sentence without any shortness of breath. Patient denies any chest pain, abdominal pain, fever.chills, or any urinary symptoms.       OBJECTIVE  PAST MEDICAL & SURGICAL HISTORY  Dyslipidemia  Coronary artery disease  Diabetes mellitus  COPD (chronic obstructive pulmonary disease)  Dyslipidemia  Chronic kidney disease  Hypertension  H/O hernia repair  S/P CABG (coronary artery bypass graft)    ALLERGIES:  No Known Allergies    MEDICATIONS:  STANDING MEDICATIONS  ALBUTerol/ipratropium for Nebulization 3 milliLiter(s) Nebulizer every 4 hours  aspirin enteric coated 81 milliGRAM(s) Oral daily  buDESOnide 160 MICROgram(s)/formoterol 4.5 MICROgram(s) Inhaler 2 Puff(s) Inhalation two times a day  carvedilol 12.5 milliGRAM(s) Oral every 12 hours  dextrose 5%. 1000 milliLiter(s) IV Continuous <Continuous>  dextrose 50% Injectable 12.5 Gram(s) IV Push once  dextrose 50% Injectable 25 Gram(s) IV Push once  dextrose 50% Injectable 25 Gram(s) IV Push once  furosemide    Tablet 20 milliGRAM(s) Oral daily  guaiFENesin  milliGRAM(s) Oral every 12 hours  heparin  Injectable 5000 Unit(s) SubCutaneous every 8 hours  influenza   Vaccine 0.5 milliLiter(s) IntraMuscular once  insulin glargine Injectable (LANTUS) 15 Unit(s) SubCutaneous at bedtime  insulin lispro (HumaLOG) corrective regimen sliding scale   SubCutaneous three times a day before meals  insulin lispro Injectable (HumaLOG) 5 Unit(s) SubCutaneous three times a day before meals  isosorbide   mononitrate ER Tablet (IMDUR) 30 milliGRAM(s) Oral daily  levoFLOXacin  Tablet 500 milliGRAM(s) Oral every 48 hours  levothyroxine 50 MICROGram(s) Oral daily  methylPREDNISolone sodium succinate Injectable 60 milliGRAM(s) IV Push every 8 hours  pantoprazole    Tablet 40 milliGRAM(s) Oral before breakfast  sertraline 50 milliGRAM(s) Oral daily  sevelamer hydrochloride 800 milliGRAM(s) Oral every 8 hours  sodium bicarbonate 650 milliGRAM(s) Oral three times a day    PRN MEDICATIONS  acetaminophen   Tablet .. 650 milliGRAM(s) Oral every 6 hours PRN  dextrose 40% Gel 15 Gram(s) Oral once PRN  glucagon  Injectable 1 milliGRAM(s) IntraMuscular once PRN  ondansetron Injectable 4 milliGRAM(s) IV Push every 6 hours PRN      VITAL SIGNS: Last 24 Hours  T(C): 36.1 (29 Nov 2018 14:02), Max: 36.3 (29 Nov 2018 06:15)  T(F): 97 (29 Nov 2018 14:02), Max: 97.4 (29 Nov 2018 06:15)  HR: 69 (29 Nov 2018 14:02) (68 - 81)  BP: 174/82 (29 Nov 2018 14:02) (132/62 - 174/82)  BP(mean): --  RR: 18 (29 Nov 2018 14:02) (18 - 18)  SpO2: 97% (29 Nov 2018 10:59) (97% - 97%)    LABS:                        8.4    13.53 )-----------( 148      ( 29 Nov 2018 08:03 )             27.6     11-29    140  |  104  |  105<HH>  ----------------------------<  248<H>  5.3<H>   |  21  |  5.5<HH>    Ca    9.1      29 Nov 2018 08:03  Phos  6.3     11-28  Mg     1.8     11-29    TPro  5.1<L>  /  Alb  2.6<L>  /  TBili  <0.2  /  DBili  x   /  AST  16  /  ALT  25  /  AlkPhos  117<H>  11-29    RADIOLOGY:      PHYSICAL EXAM:    GENERAL: well-developed, in mild respiratory distress, having trouble catching breathes when speaking  HEENT:  Atraumatic, Normocephalic. conjunctiva and sclera clear, No JVD  PULMONARY: Bilateral diffused expiratory rhonchi   CARDIOVASCULAR: Regular rate and rhythm; No murmurs  GASTROINTESTINAL: Soft, Nontender, Nondistended; Bowel sounds present  MUSCULOSKELETAL: No petal edema  NEUROLOGY: non-focal  SKIN: No rashes or lesions      ADMISSION SUMMARY  77F with PMHx COPD on home oxygen, HTN, HLD, DM, history of bypass, CKD not on dialysis presents for shortness of breath, congestion, productive cough x 4 days.    #) AHRF 2/2 COPD Exacerbation 2/2 viral infection?  - History of COPD with home O2 2L  - Increase IV solumedrol to 60mg Q8hrs, now day 4  - D/c azithromycin and start levaquin 500mg Q48hrs  - Q4 nebulizers  - Continue Symbicort 160mg   - flu and RVP panel negative, pending sputum culture  - Blood cultures negative x2  - Pending ECHO result  - Continue bipap HS and PRN  - Pulmonary recs appreciated: continue medical treatment    #) MARTIN on CKD stage V  - BNP 91383, likely caused by volume overload 2/2 CKD  - Patient still produce ample amount of urine  - Baseline Cr 2.4 as per PMD, Cr ~5.5 since admission  - Nephro recs appreciated: Start renagel, d/c calcitriol, continue sodium bicarb, bladder scan to check PVR  - hold nephrotoxic agents  - Daily BMP    #) Normocytic Anemia  - Etiologies include anemia of chronic disease (CKD) vs TEDDY vs hemolysis  - No iron deficiency anemia, , Reticulocyte 0.7%, absolute 20.9, likely 2/2 CKD    #) HTN  - Continue home meds: carvedilol, imdur  - Hold enalapril due to elevated Cr    #) DM  - Continue insulin basal + bolus  - Monitor fingersticks, will adjust if FS persistently elevated    #) CAD s/p CABG  - c/w aspirin, imdur and Coreg      DVT ppx: heparin subQ  GI ppx: protonix  Diet: DASH/ TLC  Activity: out of bed to chair + ambulate as tolerated  Code: Full code  Dispo: acute

## 2018-11-29 NOTE — PROGRESS NOTE ADULT - SUBJECTIVE AND OBJECTIVE BOX
seen and examined  on BIPAP       Standing Inpatient Medications  ALBUTerol/ipratropium for Nebulization 3 milliLiter(s) Nebulizer every 6 hours  aspirin enteric coated 81 milliGRAM(s) Oral daily  azithromycin  IVPB 500 milliGRAM(s) IV Intermittent every 24 hours  buDESOnide 160 MICROgram(s)/formoterol 4.5 MICROgram(s) Inhaler 2 Puff(s) Inhalation two times a day  calcitriol   Capsule 0.25 MICROGram(s) Oral daily  carvedilol 12.5 milliGRAM(s) Oral every 12 hours  dextrose 5%. 1000 milliLiter(s) IV Continuous <Continuous>  dextrose 50% Injectable 12.5 Gram(s) IV Push once  dextrose 50% Injectable 25 Gram(s) IV Push once  dextrose 50% Injectable 25 Gram(s) IV Push once  furosemide    Tablet 20 milliGRAM(s) Oral daily  heparin  Injectable 5000 Unit(s) SubCutaneous every 8 hours  influenza   Vaccine 0.5 milliLiter(s) IntraMuscular once  insulin glargine Injectable (LANTUS) 15 Unit(s) SubCutaneous at bedtime  insulin lispro (HumaLOG) corrective regimen sliding scale   SubCutaneous three times a day before meals  insulin lispro Injectable (HumaLOG) 5 Unit(s) SubCutaneous three times a day before meals  isosorbide   mononitrate ER Tablet (IMDUR) 30 milliGRAM(s) Oral daily  levothyroxine 50 MICROGram(s) Oral daily  methylPREDNISolone sodium succinate Injectable 60 milliGRAM(s) IV Push every 12 hours  pantoprazole    Tablet 40 milliGRAM(s) Oral before breakfast  sertraline 50 milliGRAM(s) Oral daily  sodium bicarbonate 650 milliGRAM(s) Oral three times a day        VITALS/PHYSICAL EXAM  --------------------------------------------------------------------------------  T(C): 36.3 (11-29-18 @ 06:15), Max: 36.3 (11-29-18 @ 06:15)  HR: 81 (11-29-18 @ 06:15) (68 - 81)  BP: 172/80 (11-29-18 @ 06:15) (132/62 - 172/80)  RR: 18 (11-29-18 @ 06:15) (18 - 18)  SpO2: 93% (11-28-18 @ 12:38) (93% - 93%)  Wt(kg): --  Height (cm): 165.1 (11-27-18 @ 18:57)  Weight (kg): 72.6 (11-27-18 @ 18:57)  BMI (kg/m2): 26.6 (11-27-18 @ 18:57)  BSA (m2): 1.8 (11-27-18 @ 18:57)      11-28-18 @ 07:01  -  11-29-18 @ 07:00  --------------------------------------------------------  IN: 720 mL / OUT: 600 mL / NET: 120 mL      Physical Exam:  	Gen: on bipap  	Pulm: decrease BS  B/L  	CV: S1S2; no rub  	Abd: +distended  	LE:  no edema    LABS/STUDIES  --------------------------------------------------------------------------------              8.7    11.21 >-----------<  133      [11-28-18 @ 07:33]              28.7     140  |  105  |  98  ----------------------------<  197      [11-28-18 @ 07:33]  5.0   |  20  |  5.4        Ca     9.1     [11-28-18 @ 07:33]      Mg     1.9     [11-28-18 @ 07:33]      Phos  6.3     [11-28-18 @ 07:33]    TPro  5.4  /  Alb  2.7  /  TBili  <0.2  /  DBili  x   /  AST  17  /  ALT  28  /  AlkPhos  138  [11-28-18 @ 07:33]        Troponin <0.01      [11-27-18 @ 12:49]  CK 18      [11-27-18 @ 12:49]    Creatinine Trend:  SCr 5.4 [11-28 @ 07:33]  SCr 5.4 [11-27 @ 09:36]  SCr 5.3 [11-26 @ 09:30]  SCr 4.9 [11-26 @ 01:00]  SCr 5.5 [11-25 @ 16:00]        Iron 61, TIBC 217, %sat 28      [11-28-18 @ 07:33]  HbA1c 5.8      [11-26-18 @ 09:30]

## 2018-11-29 NOTE — PROGRESS NOTE ADULT - SUBJECTIVE AND OBJECTIVE BOX
ANA HINTON  77y Female    CHIEF COMPLAINT:    Patient is a 77y old  Female who presents with a chief complaint of difficulty breathing (2018 08:13)      INTERVAL HPI/OVERNIGHT EVENTS:    Patient seen and examined. Complains that she is not feeling well. C/O cough, sob and wheezing. No fever.    ROS: All other systems are negative.    Vital Signs:    T(F): 97.4 (18 @ 06:15), Max: 97.4 (18 @ 06:15)  HR: 81 (18 @ 06:15) (68 - 81)  BP: 172/80 (18 @ 06:15) (132/62 - 172/80)  RR: 18 (18 @ 06:15) (18 - 18)  SpO2: 97% (18 @ 10:59) (93% - 97%)  I&O's Summary    2018 07:01  -  2018 07:00  --------------------------------------------------------  IN: 720 mL / OUT: 600 mL / NET: 120 mL      Daily     Daily Weight in k.8 (2018 06:15)  CAPILLARY BLOOD GLUCOSE      POCT Blood Glucose.: 256 mg/dL (2018 08:00)  POCT Blood Glucose.: 167 mg/dL (2018 21:03)  POCT Blood Glucose.: 214 mg/dL (2018 16:59)  POCT Blood Glucose.: 256 mg/dL (2018 12:50)      PHYSICAL EXAM:    GENERAL:  NAD  SKIN: No rashes or lesions  HENT: Atrumatic. Normocephalic. PERRL. Moist membranes.  NECK: Supple, No JVD. No lymphadenopathy.  PULMONARY: B/L severe wheezing  CVS: Normal S1, S2. Rate and Rythm are regular. No murmurs.  ABDOMEN/GI: Soft, Nontender, Nondistended; BS present  EXTREMITIES: Peripheral pulses intact. No edema B/L LE.  NEUROLOGIC:  No motor or sensory deficit.  PSYCH: Alert & oriented x 3    Consultant(s) Notes Reviewed:  [x ] YES  [ ] NO  Care Discussed with Consultants/Other Providers [ x] YES  [ ] NO    EKG reviewed  Telemetry reviewed    LABS:                        8.4    13.53 )-----------( 148      ( 2018 08:03 )             27.6         140  |  104  |  105<HH>  ----------------------------<  248<H>  Creatinine Trend: 5.5<--, 5.4<--, 5.4<--, 5.3<--, 4.9<--, 5.5<--  5.3<H>   |  21  |  5.5<HH>    Ca    9.1      2018 08:03  Phos  6.3       Mg     1.8         TPro  5.1<L>  /  Alb  2.6<L>  /  TBili  <0.2  /  DBili  x   /  AST  16  /  ALT  25  /  AlkPhos  117<H>        Serum Pro-Brain Natriuretic Peptide: 75579 pg/mL (18 @ 01:00)    Trop <0.01, CKMB 2.9, CK 18, 18 @ 12:49        RADIOLOGY & ADDITIONAL TESTS:      Imaging or report Personally Reviewed:  [ ] YES  [ ] NO    Medications:  Standing  ALBUTerol/ipratropium for Nebulization 3 milliLiter(s) Nebulizer every 4 hours  aspirin enteric coated 81 milliGRAM(s) Oral daily  buDESOnide 160 MICROgram(s)/formoterol 4.5 MICROgram(s) Inhaler 2 Puff(s) Inhalation two times a day  calcitriol   Capsule 0.25 MICROGram(s) Oral daily  carvedilol 12.5 milliGRAM(s) Oral every 12 hours  dextrose 5%. 1000 milliLiter(s) IV Continuous <Continuous>  dextrose 50% Injectable 12.5 Gram(s) IV Push once  dextrose 50% Injectable 25 Gram(s) IV Push once  dextrose 50% Injectable 25 Gram(s) IV Push once  furosemide    Tablet 20 milliGRAM(s) Oral daily  guaiFENesin  milliGRAM(s) Oral every 12 hours  heparin  Injectable 5000 Unit(s) SubCutaneous every 8 hours  influenza   Vaccine 0.5 milliLiter(s) IntraMuscular once  insulin glargine Injectable (LANTUS) 15 Unit(s) SubCutaneous at bedtime  insulin lispro (HumaLOG) corrective regimen sliding scale   SubCutaneous three times a day before meals  insulin lispro Injectable (HumaLOG) 5 Unit(s) SubCutaneous three times a day before meals  isosorbide   mononitrate ER Tablet (IMDUR) 30 milliGRAM(s) Oral daily  levoFLOXacin  Tablet 500 milliGRAM(s) Oral every 48 hours  levothyroxine 50 MICROGram(s) Oral daily  methylPREDNISolone sodium succinate Injectable 60 milliGRAM(s) IV Push every 8 hours  pantoprazole    Tablet 40 milliGRAM(s) Oral before breakfast  sertraline 50 milliGRAM(s) Oral daily  sodium bicarbonate 650 milliGRAM(s) Oral three times a day    PRN Meds  acetaminophen   Tablet .. 650 milliGRAM(s) Oral every 6 hours PRN  dextrose 40% Gel 15 Gram(s) Oral once PRN  glucagon  Injectable 1 milliGRAM(s) IntraMuscular once PRN  ondansetron Injectable 4 milliGRAM(s) IV Push every 6 hours PRN      Case discussed with resident    Care discussed with pt/family

## 2018-11-29 NOTE — PROGRESS NOTE ADULT - ASSESSMENT
78 yo female with PMHx COPD on home oxygen, HTN, HLD, DM, history of bypass, CKD not on dialysis presents for shortness of breath, congestion, productive cough x 4 days.    1.	COPD exacerbation  2.	CKD-V  3.	DM-2  4.	HTN/DL  5.	Hyperkalemia         PLAN:    ·	Change Nebs to q 4h and PRN  ·	Increase Solumedrol to 60 mg ivpb q 8h  ·	cont Symbicort twice a day  ·	D/C Zinthromax. Started Levaquin 500 mg po q 48h  ·	Pulm eval noted  ·	Monitor FS. Cont Insulin. Adjust the Insulin dose as needed.  ·	Follow electrolytes.  ·	Plan of care D/W the pt and her daughter on bedside.

## 2018-11-30 LAB
ANION GAP SERPL CALC-SCNC: 17 MMOL/L — HIGH (ref 7–14)
BUN SERPL-MCNC: 112 MG/DL — CRITICAL HIGH (ref 10–20)
CALCIUM SERPL-MCNC: 9.3 MG/DL — SIGNIFICANT CHANGE UP (ref 8.5–10.1)
CHLORIDE SERPL-SCNC: 103 MMOL/L — SIGNIFICANT CHANGE UP (ref 98–110)
CO2 SERPL-SCNC: 22 MMOL/L — SIGNIFICANT CHANGE UP (ref 17–32)
CREAT SERPL-MCNC: 5.3 MG/DL — CRITICAL HIGH (ref 0.7–1.5)
GLUCOSE BLDC GLUCOMTR-MCNC: 226 MG/DL — HIGH (ref 70–99)
GLUCOSE BLDC GLUCOMTR-MCNC: 302 MG/DL — HIGH (ref 70–99)
GLUCOSE BLDC GLUCOMTR-MCNC: 306 MG/DL — HIGH (ref 70–99)
GLUCOSE BLDC GLUCOMTR-MCNC: 342 MG/DL — HIGH (ref 70–99)
GLUCOSE SERPL-MCNC: 250 MG/DL — HIGH (ref 70–99)
HCT VFR BLD CALC: 28.3 % — LOW (ref 37–47)
HGB BLD-MCNC: 8.7 G/DL — LOW (ref 12–16)
MAGNESIUM SERPL-MCNC: 1.8 MG/DL — SIGNIFICANT CHANGE UP (ref 1.8–2.4)
MCHC RBC-ENTMCNC: 26.4 PG — LOW (ref 27–31)
MCHC RBC-ENTMCNC: 30.7 G/DL — LOW (ref 32–37)
MCV RBC AUTO: 85.8 FL — SIGNIFICANT CHANGE UP (ref 81–99)
NRBC # BLD: 0 /100 WBCS — SIGNIFICANT CHANGE UP (ref 0–0)
PLATELET # BLD AUTO: 137 K/UL — SIGNIFICANT CHANGE UP (ref 130–400)
POTASSIUM SERPL-MCNC: 5.1 MMOL/L — HIGH (ref 3.5–5)
POTASSIUM SERPL-SCNC: 5.1 MMOL/L — HIGH (ref 3.5–5)
RBC # BLD: 3.3 M/UL — LOW (ref 4.2–5.4)
RBC # FLD: 17.5 % — HIGH (ref 11.5–14.5)
SODIUM SERPL-SCNC: 142 MMOL/L — SIGNIFICANT CHANGE UP (ref 135–146)
WBC # BLD: 11.67 K/UL — HIGH (ref 4.8–10.8)
WBC # FLD AUTO: 11.67 K/UL — HIGH (ref 4.8–10.8)

## 2018-11-30 RX ORDER — DOCUSATE SODIUM 100 MG
100 CAPSULE ORAL
Qty: 0 | Refills: 0 | Status: DISCONTINUED | OUTPATIENT
Start: 2018-11-30 | End: 2018-12-04

## 2018-11-30 RX ORDER — INSULIN GLARGINE 100 [IU]/ML
22 INJECTION, SOLUTION SUBCUTANEOUS AT BEDTIME
Qty: 0 | Refills: 0 | Status: DISCONTINUED | OUTPATIENT
Start: 2018-11-30 | End: 2018-12-01

## 2018-11-30 RX ORDER — AMLODIPINE BESYLATE 2.5 MG/1
5 TABLET ORAL ONCE
Qty: 0 | Refills: 0 | Status: COMPLETED | OUTPATIENT
Start: 2018-11-30 | End: 2018-11-30

## 2018-11-30 RX ORDER — SENNA PLUS 8.6 MG/1
2 TABLET ORAL AT BEDTIME
Qty: 0 | Refills: 0 | Status: DISCONTINUED | OUTPATIENT
Start: 2018-11-30 | End: 2018-12-04

## 2018-11-30 RX ORDER — INSULIN LISPRO 100/ML
7 VIAL (ML) SUBCUTANEOUS
Qty: 0 | Refills: 0 | Status: DISCONTINUED | OUTPATIENT
Start: 2018-11-30 | End: 2018-12-04

## 2018-11-30 RX ORDER — FUROSEMIDE 40 MG
60 TABLET ORAL DAILY
Qty: 0 | Refills: 0 | Status: DISCONTINUED | OUTPATIENT
Start: 2018-11-30 | End: 2018-12-01

## 2018-11-30 RX ORDER — IPRATROPIUM/ALBUTEROL SULFATE 18-103MCG
3 AEROSOL WITH ADAPTER (GRAM) INHALATION EVERY 6 HOURS
Qty: 0 | Refills: 0 | Status: DISCONTINUED | OUTPATIENT
Start: 2018-11-30 | End: 2018-12-04

## 2018-11-30 RX ORDER — INSULIN LISPRO 100/ML
VIAL (ML) SUBCUTANEOUS
Qty: 0 | Refills: 0 | Status: DISCONTINUED | OUTPATIENT
Start: 2018-11-30 | End: 2018-12-04

## 2018-11-30 RX ORDER — ALPRAZOLAM 0.25 MG
0.25 TABLET ORAL ONCE
Qty: 0 | Refills: 0 | Status: DISCONTINUED | OUTPATIENT
Start: 2018-11-30 | End: 2018-11-30

## 2018-11-30 RX ORDER — POLYETHYLENE GLYCOL 3350 17 G/17G
17 POWDER, FOR SOLUTION ORAL
Qty: 0 | Refills: 0 | Status: DISCONTINUED | OUTPATIENT
Start: 2018-11-30 | End: 2018-12-04

## 2018-11-30 RX ADMIN — Medication 20 MILLIGRAM(S): at 06:08

## 2018-11-30 RX ADMIN — SEVELAMER CARBONATE 800 MILLIGRAM(S): 2400 POWDER, FOR SUSPENSION ORAL at 22:15

## 2018-11-30 RX ADMIN — SENNA PLUS 2 TABLET(S): 8.6 TABLET ORAL at 22:15

## 2018-11-30 RX ADMIN — Medication 100 MILLIGRAM(S): at 18:20

## 2018-11-30 RX ADMIN — Medication 5 UNIT(S): at 08:15

## 2018-11-30 RX ADMIN — Medication 4: at 11:54

## 2018-11-30 RX ADMIN — Medication 3 MILLILITER(S): at 12:26

## 2018-11-30 RX ADMIN — Medication 3 MILLILITER(S): at 08:29

## 2018-11-30 RX ADMIN — PANTOPRAZOLE SODIUM 40 MILLIGRAM(S): 20 TABLET, DELAYED RELEASE ORAL at 06:09

## 2018-11-30 RX ADMIN — Medication 650 MILLIGRAM(S): at 22:15

## 2018-11-30 RX ADMIN — Medication 60 MILLIGRAM(S): at 16:19

## 2018-11-30 RX ADMIN — Medication 650 MILLIGRAM(S): at 06:08

## 2018-11-30 RX ADMIN — HEPARIN SODIUM 5000 UNIT(S): 5000 INJECTION INTRAVENOUS; SUBCUTANEOUS at 13:07

## 2018-11-30 RX ADMIN — Medication 600 MILLIGRAM(S): at 06:04

## 2018-11-30 RX ADMIN — CARVEDILOL PHOSPHATE 12.5 MILLIGRAM(S): 80 CAPSULE, EXTENDED RELEASE ORAL at 06:04

## 2018-11-30 RX ADMIN — Medication 0.25 MILLIGRAM(S): at 23:47

## 2018-11-30 RX ADMIN — POLYETHYLENE GLYCOL 3350 17 GRAM(S): 17 POWDER, FOR SOLUTION ORAL at 18:20

## 2018-11-30 RX ADMIN — Medication 5 UNIT(S): at 18:25

## 2018-11-30 RX ADMIN — Medication 650 MILLIGRAM(S): at 13:07

## 2018-11-30 RX ADMIN — Medication 3 MILLILITER(S): at 20:24

## 2018-11-30 RX ADMIN — Medication 3 MILLILITER(S): at 16:40

## 2018-11-30 RX ADMIN — Medication 50 MICROGRAM(S): at 06:08

## 2018-11-30 RX ADMIN — SEVELAMER CARBONATE 800 MILLIGRAM(S): 2400 POWDER, FOR SUSPENSION ORAL at 06:08

## 2018-11-30 RX ADMIN — HEPARIN SODIUM 5000 UNIT(S): 5000 INJECTION INTRAVENOUS; SUBCUTANEOUS at 06:07

## 2018-11-30 RX ADMIN — SERTRALINE 50 MILLIGRAM(S): 25 TABLET, FILM COATED ORAL at 11:56

## 2018-11-30 RX ADMIN — ISOSORBIDE MONONITRATE 30 MILLIGRAM(S): 60 TABLET, EXTENDED RELEASE ORAL at 11:56

## 2018-11-30 RX ADMIN — INSULIN GLARGINE 22 UNIT(S): 100 INJECTION, SOLUTION SUBCUTANEOUS at 23:09

## 2018-11-30 RX ADMIN — CARVEDILOL PHOSPHATE 12.5 MILLIGRAM(S): 80 CAPSULE, EXTENDED RELEASE ORAL at 18:19

## 2018-11-30 RX ADMIN — Medication 5 UNIT(S): at 11:54

## 2018-11-30 RX ADMIN — SEVELAMER CARBONATE 800 MILLIGRAM(S): 2400 POWDER, FOR SUSPENSION ORAL at 13:07

## 2018-11-30 RX ADMIN — Medication 2: at 08:16

## 2018-11-30 RX ADMIN — AMLODIPINE BESYLATE 5 MILLIGRAM(S): 2.5 TABLET ORAL at 23:47

## 2018-11-30 RX ADMIN — Medication 60 MILLIGRAM(S): at 06:08

## 2018-11-30 RX ADMIN — Medication 60 MILLIGRAM(S): at 13:06

## 2018-11-30 RX ADMIN — Medication 60 MILLIGRAM(S): at 22:16

## 2018-11-30 RX ADMIN — Medication 81 MILLIGRAM(S): at 11:55

## 2018-11-30 RX ADMIN — Medication 4: at 17:24

## 2018-11-30 RX ADMIN — HEPARIN SODIUM 5000 UNIT(S): 5000 INJECTION INTRAVENOUS; SUBCUTANEOUS at 22:15

## 2018-11-30 RX ADMIN — BUDESONIDE AND FORMOTEROL FUMARATE DIHYDRATE 2 PUFF(S): 160; 4.5 AEROSOL RESPIRATORY (INHALATION) at 22:00

## 2018-11-30 RX ADMIN — Medication 600 MILLIGRAM(S): at 18:19

## 2018-11-30 RX ADMIN — BUDESONIDE AND FORMOTEROL FUMARATE DIHYDRATE 2 PUFF(S): 160; 4.5 AEROSOL RESPIRATORY (INHALATION) at 08:16

## 2018-11-30 NOTE — PROGRESS NOTE ADULT - SUBJECTIVE AND OBJECTIVE BOX
Nephrology progress note    Patient was seen and examined, events over the last 24 h noted .  Cr stable    Allergies:  No Known Allergies    Hospital Medications:   MEDICATIONS  (STANDING):  ALBUTerol/ipratropium for Nebulization 3 milliLiter(s) Nebulizer every 4 hours  aspirin enteric coated 81 milliGRAM(s) Oral daily  buDESOnide 160 MICROgram(s)/formoterol 4.5 MICROgram(s) Inhaler 2 Puff(s) Inhalation two times a day  carvedilol 12.5 milliGRAM(s) Oral every 12 hours  dextrose 5%. 1000 milliLiter(s) (50 mL/Hr) IV Continuous <Continuous>  dextrose 50% Injectable 12.5 Gram(s) IV Push once  dextrose 50% Injectable 25 Gram(s) IV Push once  dextrose 50% Injectable 25 Gram(s) IV Push once  furosemide    Tablet 20 milliGRAM(s) Oral daily  guaiFENesin  milliGRAM(s) Oral every 12 hours  heparin  Injectable 5000 Unit(s) SubCutaneous every 8 hours  influenza   Vaccine 0.5 milliLiter(s) IntraMuscular once  insulin glargine Injectable (LANTUS) 15 Unit(s) SubCutaneous at bedtime  insulin lispro (HumaLOG) corrective regimen sliding scale   SubCutaneous three times a day before meals  insulin lispro Injectable (HumaLOG) 5 Unit(s) SubCutaneous three times a day before meals  isosorbide   mononitrate ER Tablet (IMDUR) 30 milliGRAM(s) Oral daily  levoFLOXacin  Tablet 500 milliGRAM(s) Oral every 48 hours  levothyroxine 50 MICROGram(s) Oral daily  methylPREDNISolone sodium succinate Injectable 60 milliGRAM(s) IV Push every 8 hours  pantoprazole    Tablet 40 milliGRAM(s) Oral before breakfast  sertraline 50 milliGRAM(s) Oral daily  sevelamer hydrochloride 800 milliGRAM(s) Oral every 8 hours  sodium bicarbonate 650 milliGRAM(s) Oral three times a day        VITALS:  T(F): 97.3 (11-30-18 @ 04:33), Max: 97.3 (11-30-18 @ 04:33)  HR: 71 (11-30-18 @ 04:33)  BP: 157/73 (11-30-18 @ 04:33)  RR: 20 (11-30-18 @ 04:33)  SpO2: --  Wt(kg): --    11-28 @ 07:01 - 11-29 @ 07:00  --------------------------------------------------------  IN: 720 mL / OUT: 600 mL / NET: 120 mL    11-29 @ 07:01 - 11-30 @ 07:00  --------------------------------------------------------  IN: 540 mL / OUT: 250 mL / NET: 290 mL          PHYSICAL EXAM:  	Gen: on bipap  	Pulm: decrease BS  B/L  	CV: S1S2; no rub  	Abd: +distended  	LE:  no edema      LABS:  11-30    142  |  103  |  112<HH>  ----------------------------<  250<H>  5.1<H>   |  22  |  5.3<HH>    Ca    9.3      30 Nov 2018 09:01  Mg     1.8     11-30    TPro  5.1<L>  /  Alb  2.6<L>  /  TBili  <0.2  /  DBili      /  AST  16  /  ALT  25  /  AlkPhos  117<H>  11-29                          8.7    11.67 )-----------( 137      ( 30 Nov 2018 09:01 )             28.3       Urine Studies:      RADIOLOGY & ADDITIONAL STUDIES:

## 2018-11-30 NOTE — PROGRESS NOTE ADULT - SUBJECTIVE AND OBJECTIVE BOX
OVERNIGHT EVENTS: STILL C/O SOB, COUGH, CXR REVIEWED    Vital Signs Last 24 Hrs  T(C): 36.3 (30 Nov 2018 13:25), Max: 36.3 (30 Nov 2018 04:33)  T(F): 97.3 (30 Nov 2018 13:25), Max: 97.3 (30 Nov 2018 04:33)  HR: 68 (30 Nov 2018 13:25) (68 - 78)  BP: 181/79 (30 Nov 2018 13:25) (156/69 - 181/79)  BP(mean): --  RR: 18 (30 Nov 2018 13:25) (18 - 20)  SpO2: 94%    PHYSICAL EXAMINATION:    GENERAL: The patient is awake and alert in no apparent distress.     HEENT: Head is normocephalic and atraumatic. Extraocular muscles are intact. Mucous membranes are moist.    NECK: Supple.    LUNGS: diffuse exp wheezing    HEART: Regular rate and rhythm without murmur.    ABDOMEN: Soft, nontender, and nondistended.      EXTREMITIES: Without any cyanosis, clubbing, rash, lesions or edema.    NEUROLOGIC: Grossly intact.    SKIN: No ulceration or induration present.      LABS:                        8.7    11.67 )-----------( 137      ( 30 Nov 2018 09:01 )             28.3     11-30    142  |  103  |  112<HH>  ----------------------------<  250<H>  5.1<H>   |  22  |  5.3<HH>    Ca    9.3      30 Nov 2018 09:01  Mg     1.8     11-30    TPro  5.1<L>  /  Alb  2.6<L>  /  TBili  <0.2  /  DBili  x   /  AST  16  /  ALT  25  /  AlkPhos  117<H>  11-29 11-29-18 @ 07:01  -  11-30-18 @ 07:00  --------------------------------------------------------  IN: 540 mL / OUT: 250 mL / NET: 290 mL    11-30-18 @ 07:01  -  11-30-18 @ 16:48  --------------------------------------------------------  IN: 0 mL / OUT: 300 mL / NET: -300 mL        MICROBIOLOGY:      MEDICATIONS  (STANDING):  ALBUTerol/ipratropium for Nebulization 3 milliLiter(s) Nebulizer every 4 hours  aspirin enteric coated 81 milliGRAM(s) Oral daily  buDESOnide 160 MICROgram(s)/formoterol 4.5 MICROgram(s) Inhaler 2 Puff(s) Inhalation two times a day  carvedilol 12.5 milliGRAM(s) Oral every 12 hours  dextrose 5%. 1000 milliLiter(s) (50 mL/Hr) IV Continuous <Continuous>  dextrose 50% Injectable 12.5 Gram(s) IV Push once  dextrose 50% Injectable 25 Gram(s) IV Push once  dextrose 50% Injectable 25 Gram(s) IV Push once  furosemide   Injectable 60 milliGRAM(s) IV Push daily  guaiFENesin  milliGRAM(s) Oral every 12 hours  heparin  Injectable 5000 Unit(s) SubCutaneous every 8 hours  influenza   Vaccine 0.5 milliLiter(s) IntraMuscular once  insulin glargine Injectable (LANTUS) 15 Unit(s) SubCutaneous at bedtime  insulin lispro (HumaLOG) corrective regimen sliding scale   SubCutaneous three times a day before meals  insulin lispro Injectable (HumaLOG) 5 Unit(s) SubCutaneous three times a day before meals  isosorbide   mononitrate ER Tablet (IMDUR) 30 milliGRAM(s) Oral daily  levoFLOXacin  Tablet 500 milliGRAM(s) Oral every 48 hours  levothyroxine 50 MICROGram(s) Oral daily  methylPREDNISolone sodium succinate Injectable 60 milliGRAM(s) IV Push every 8 hours  pantoprazole    Tablet 40 milliGRAM(s) Oral before breakfast  sertraline 50 milliGRAM(s) Oral daily  sevelamer hydrochloride 800 milliGRAM(s) Oral every 8 hours  sodium bicarbonate 650 milliGRAM(s) Oral three times a day    MEDICATIONS  (PRN):  acetaminophen   Tablet .. 650 milliGRAM(s) Oral every 6 hours PRN Moderate Pain (4 - 6)  ALBUTerol/ipratropium for Nebulization 3 milliLiter(s) Nebulizer every 6 hours PRN Shortness of Breath and/or Wheezing  dextrose 40% Gel 15 Gram(s) Oral once PRN Blood Glucose LESS THAN 70 milliGRAM(s)/deciliter  glucagon  Injectable 1 milliGRAM(s) IntraMuscular once PRN Glucose LESS THAN 70 milligrams/deciliter  ondansetron Injectable 4 milliGRAM(s) IV Push every 6 hours PRN Nausea and/or Vomiting      RADIOLOGY & ADDITIONAL STUDIES:

## 2018-11-30 NOTE — PROGRESS NOTE ADULT - ASSESSMENT
78 yo female with PMHx COPD on home oxygen, HTN, HLD, DM, history of bypass, CKD not on dialysis presents for shortness of breath, congestion, productive cough x 4 days.    1.	COPD exacerbation  2.	CKD-V  3.	DM-2  4.	HTN/DL  5.	Hyperkalemia         PLAN:    ·	Care D/W the pulmonary.  ·	Cont Solumedrol 60 mg ivpb q 8h  ·	cont Symbicort twice a day  ·	Cont Levaquin 500 mg po q 48h  ·	Repeat CXR  ·	Monitor FS. Cont Insulin. Adjust the Insulin dose as needed.  ·	Follow electrolytes.  ·	Plan of care D/W the pt and her daughter on bedside.

## 2018-11-30 NOTE — PROGRESS NOTE ADULT - ASSESSMENT
ANA HINTON 77y Female  MRN#: [id number]   CODE STATUS: Full code      SUBJECTIVE  Patient is a 77y old Female who presented with a chief complaint of shortness of breath and productive cough  Currently admitted to medicine with the primary diagnosis of acute exacerbation of COPD  Today is hospital day 5d, and this morning she is resting in bed with 2L NC and reports no overnight events. Patient states that her breathing has not improved, with frequent coughs and congestion. Patient is able to speak in full sentence without any shortness of breath. Patient denies any chest pain, abdominal pain, fever.chills, or any urinary symptoms.       OBJECTIVE  PAST MEDICAL & SURGICAL HISTORY  Dyslipidemia  Coronary artery disease  Diabetes mellitus  COPD (chronic obstructive pulmonary disease)  Dyslipidemia  Chronic kidney disease  Hypertension  H/O hernia repair  S/P CABG (coronary artery bypass graft)    ALLERGIES:  No Known Allergies    MEDICATIONS:  STANDING MEDICATIONS  ALBUTerol/ipratropium for Nebulization 3 milliLiter(s) Nebulizer every 4 hours  aspirin enteric coated 81 milliGRAM(s) Oral daily  buDESOnide 160 MICROgram(s)/formoterol 4.5 MICROgram(s) Inhaler 2 Puff(s) Inhalation two times a day  carvedilol 12.5 milliGRAM(s) Oral every 12 hours  dextrose 5%. 1000 milliLiter(s) IV Continuous <Continuous>  dextrose 50% Injectable 12.5 Gram(s) IV Push once  dextrose 50% Injectable 25 Gram(s) IV Push once  dextrose 50% Injectable 25 Gram(s) IV Push once  furosemide   Injectable 60 milliGRAM(s) IV Push daily  guaiFENesin  milliGRAM(s) Oral every 12 hours  heparin  Injectable 5000 Unit(s) SubCutaneous every 8 hours  influenza   Vaccine 0.5 milliLiter(s) IntraMuscular once  insulin glargine Injectable (LANTUS) 15 Unit(s) SubCutaneous at bedtime  insulin lispro (HumaLOG) corrective regimen sliding scale   SubCutaneous three times a day before meals  insulin lispro Injectable (HumaLOG) 5 Unit(s) SubCutaneous three times a day before meals  isosorbide   mononitrate ER Tablet (IMDUR) 30 milliGRAM(s) Oral daily  levoFLOXacin  Tablet 500 milliGRAM(s) Oral every 48 hours  levothyroxine 50 MICROGram(s) Oral daily  methylPREDNISolone sodium succinate Injectable 60 milliGRAM(s) IV Push every 8 hours  pantoprazole    Tablet 40 milliGRAM(s) Oral before breakfast  sertraline 50 milliGRAM(s) Oral daily  sevelamer hydrochloride 800 milliGRAM(s) Oral every 8 hours  sodium bicarbonate 650 milliGRAM(s) Oral three times a day    PRN MEDICATIONS  acetaminophen   Tablet .. 650 milliGRAM(s) Oral every 6 hours PRN  ALBUTerol/ipratropium for Nebulization 3 milliLiter(s) Nebulizer every 6 hours PRN  dextrose 40% Gel 15 Gram(s) Oral once PRN  glucagon  Injectable 1 milliGRAM(s) IntraMuscular once PRN  ondansetron Injectable 4 milliGRAM(s) IV Push every 6 hours PRN      VITAL SIGNS: Last 24 Hours  T(C): 36.3 (30 Nov 2018 13:25), Max: 36.3 (30 Nov 2018 04:33)  T(F): 97.3 (30 Nov 2018 13:25), Max: 97.3 (30 Nov 2018 04:33)  HR: 68 (30 Nov 2018 13:25) (68 - 78)  BP: 181/79 (30 Nov 2018 13:25) (156/69 - 181/79)  BP(mean): --  RR: 18 (30 Nov 2018 13:25) (18 - 20)  SpO2: --    LABS:                        8.7    11.67 )-----------( 137      ( 30 Nov 2018 09:01 )             28.3     11-30    142  |  103  |  112<HH>  ----------------------------<  250<H>  5.1<H>   |  22  |  5.3<HH>    Ca    9.3      30 Nov 2018 09:01  Mg     1.8     11-30    TPro  5.1<L>  /  Alb  2.6<L>  /  TBili  <0.2  /  DBili  x   /  AST  16  /  ALT  25  /  AlkPhos  117<H>  11-29    RADIOLOGY:  < from: Xray Chest 1 View- PORTABLE-Urgent (11.30.18 @ 13:51) >  Small pleural effusions, not appreciated on prior exam.    < end of copied text >      PHYSICAL EXAM:  GENERAL: well-developed, NAD, still gets short of breath during interview  HEENT:  Atraumatic, Normocephalic. conjunctiva and sclera clear, No JVD  PULMONARY: Bilateral diffused expiratory rhonchi   CARDIOVASCULAR: Regular rate and rhythm; No murmurs  GASTROINTESTINAL: Soft, Nontender, Nondistended; Bowel sounds present  MUSCULOSKELETAL: No petal edema  NEUROLOGY: non-focal  SKIN: No rashes or lesions      ADMISSION SUMMARY  77F with PMHx COPD on home oxygen, HTN, HLD, DM, history of bypass, CKD not on dialysis presents for shortness of breath, congestion, productive cough x 4 days.    #) AHRF 2/2 COPD Exacerbation and fluid overload 2/2 CKD  - History of COPD with home O2 2L, continue O2 therapy   - ECHO shows EF 41% with moderate MVR, thickening of mitral roselia leaflets, mild pulm HTN  - Repeated CXR shows worsening pleural effusion bilaterally  - Continue IV solumedrol 60mg Q8hrs, now day 5  - Continue Levaquin 500mg Q48hrs  - Q4 nebulizers and PRN  - Continue Symbicort 160mg   - Start IV lasix 60mg daily  - flu and RVP panel negative, pending sputum culture  - Blood cultures negative x2  - Continue bipap HS and PRN  - Pulmonary recs appreciated    #) MARTIN on CKD stage V  - BNP [id number], likely caused by volume overload 2/2 CKD  - Patient still produce ample amount of urine  - Baseline Cr 2.4 as per PMD, Cr ~5.5 since admission  - Nephro recs appreciated: Start renagel, d/c calcitriol, continue sodium bicarb, bladder scan to check PVR  - hold nephrotoxic agents  - Fluid restriction < 1L  - Daily BMP to avoid over-diuresis    #) Normocytic Anemia  - Etiologies include anemia of chronic disease (CKD) vs TEDDY vs hemolysis  - No iron deficiency anemia, , Reticulocyte 0.7%, absolute 20.9, likely 2/2 CKD    #) HTN  - Continue home meds: carvedilol, Imdur  - Hold enalapril due to elevated Cr    #) DM  - Continue insulin basal + bolus  - Monitor fingersticks, will adjust if FS persistently elevated    #) CAD s/p CABG  - c/w aspirin, imdur and Coreg      DVT ppx: heparin subQ  GI ppx: protonix  Diet: DASH/ TLC  Activity: out of bed to chair + ambulate as tolerated  Code: Full code  Dispo: acute, continue medical management

## 2018-11-30 NOTE — PROGRESS NOTE ADULT - ASSESSMENT
respiratory failure/copd/? MARTIN/stage 5 ckd :  - creatinine stable  -non oliguric  - sono : no hydro  -check bladder scan for PVR  - lasix 20 with cr 5.4 has  minimal effect if any, can d/c lasix   - no IV fluids  - phos noted, cont sevelamer  - corrected calcium around 10, now  off calcitriol  - start norvasc 5   - continue sodium bicarbonate  - check ferritin, can start KYMBERLY once BP better controlled  - no indication for RRT

## 2018-11-30 NOTE — PROGRESS NOTE ADULT - ASSESSMENT
IMPRESSION:    Acute hypercapnic respiratory failure/ COPD exacerbation/ fluid overlaod  multiple co morbidities    PLAN:    - keep IV steroids decrease q 12h  - HD KEEP NEG BALANCE  - BIPAP ar nite and as needed  - neb q 6 h  - DVT prophylaxis  - poor prognosis  - DNR/ DNI

## 2018-12-01 LAB
ANION GAP SERPL CALC-SCNC: 19 MMOL/L — HIGH (ref 7–14)
BUN SERPL-MCNC: 117 MG/DL — CRITICAL HIGH (ref 10–20)
CALCIUM SERPL-MCNC: 9.5 MG/DL — SIGNIFICANT CHANGE UP (ref 8.5–10.1)
CHLORIDE SERPL-SCNC: 102 MMOL/L — SIGNIFICANT CHANGE UP (ref 98–110)
CO2 SERPL-SCNC: 20 MMOL/L — SIGNIFICANT CHANGE UP (ref 17–32)
CREAT SERPL-MCNC: 5 MG/DL — CRITICAL HIGH (ref 0.7–1.5)
CULTURE RESULTS: SIGNIFICANT CHANGE UP
CULTURE RESULTS: SIGNIFICANT CHANGE UP
GLUCOSE BLDC GLUCOMTR-MCNC: 209 MG/DL — HIGH (ref 70–99)
GLUCOSE BLDC GLUCOMTR-MCNC: 264 MG/DL — HIGH (ref 70–99)
GLUCOSE BLDC GLUCOMTR-MCNC: 380 MG/DL — HIGH (ref 70–99)
GLUCOSE BLDC GLUCOMTR-MCNC: 385 MG/DL — HIGH (ref 70–99)
GLUCOSE SERPL-MCNC: 202 MG/DL — HIGH (ref 70–99)
HCT VFR BLD CALC: 27 % — LOW (ref 37–47)
HGB BLD-MCNC: 8.6 G/DL — LOW (ref 12–16)
MAGNESIUM SERPL-MCNC: 1.7 MG/DL — LOW (ref 1.8–2.4)
MCHC RBC-ENTMCNC: 26.6 PG — LOW (ref 27–31)
MCHC RBC-ENTMCNC: 31.9 G/DL — LOW (ref 32–37)
MCV RBC AUTO: 83.6 FL — SIGNIFICANT CHANGE UP (ref 81–99)
NRBC # BLD: 0 /100 WBCS — SIGNIFICANT CHANGE UP (ref 0–0)
PLATELET # BLD AUTO: 143 K/UL — SIGNIFICANT CHANGE UP (ref 130–400)
POTASSIUM SERPL-MCNC: 5 MMOL/L — SIGNIFICANT CHANGE UP (ref 3.5–5)
POTASSIUM SERPL-SCNC: 5 MMOL/L — SIGNIFICANT CHANGE UP (ref 3.5–5)
RBC # BLD: 3.23 M/UL — LOW (ref 4.2–5.4)
RBC # FLD: 17.5 % — HIGH (ref 11.5–14.5)
SODIUM SERPL-SCNC: 141 MMOL/L — SIGNIFICANT CHANGE UP (ref 135–146)
SPECIMEN SOURCE: SIGNIFICANT CHANGE UP
SPECIMEN SOURCE: SIGNIFICANT CHANGE UP
WBC # BLD: 15.01 K/UL — HIGH (ref 4.8–10.8)
WBC # FLD AUTO: 15.01 K/UL — HIGH (ref 4.8–10.8)

## 2018-12-01 RX ORDER — INSULIN GLARGINE 100 [IU]/ML
27 INJECTION, SOLUTION SUBCUTANEOUS AT BEDTIME
Qty: 0 | Refills: 0 | Status: DISCONTINUED | OUTPATIENT
Start: 2018-12-01 | End: 2018-12-02

## 2018-12-01 RX ORDER — AMLODIPINE BESYLATE 2.5 MG/1
5 TABLET ORAL DAILY
Qty: 0 | Refills: 0 | Status: DISCONTINUED | OUTPATIENT
Start: 2018-12-01 | End: 2018-12-04

## 2018-12-01 RX ORDER — FERROUS SULFATE 325(65) MG
325 TABLET ORAL EVERY 8 HOURS
Qty: 0 | Refills: 0 | Status: DISCONTINUED | OUTPATIENT
Start: 2018-12-01 | End: 2018-12-04

## 2018-12-01 RX ORDER — ERYTHROPOIETIN 10000 [IU]/ML
5000 INJECTION, SOLUTION INTRAVENOUS; SUBCUTANEOUS
Qty: 0 | Refills: 0 | Status: DISCONTINUED | OUTPATIENT
Start: 2018-12-01 | End: 2018-12-04

## 2018-12-01 RX ORDER — FUROSEMIDE 40 MG
40 TABLET ORAL EVERY 12 HOURS
Qty: 0 | Refills: 0 | Status: DISCONTINUED | OUTPATIENT
Start: 2018-12-01 | End: 2018-12-02

## 2018-12-01 RX ADMIN — Medication 650 MILLIGRAM(S): at 14:32

## 2018-12-01 RX ADMIN — HEPARIN SODIUM 5000 UNIT(S): 5000 INJECTION INTRAVENOUS; SUBCUTANEOUS at 06:07

## 2018-12-01 RX ADMIN — Medication 10: at 12:33

## 2018-12-01 RX ADMIN — Medication 3 MILLILITER(S): at 07:50

## 2018-12-01 RX ADMIN — CARVEDILOL PHOSPHATE 12.5 MILLIGRAM(S): 80 CAPSULE, EXTENDED RELEASE ORAL at 17:08

## 2018-12-01 RX ADMIN — AMLODIPINE BESYLATE 5 MILLIGRAM(S): 2.5 TABLET ORAL at 17:50

## 2018-12-01 RX ADMIN — SENNA PLUS 2 TABLET(S): 8.6 TABLET ORAL at 22:19

## 2018-12-01 RX ADMIN — Medication 3 MILLILITER(S): at 13:26

## 2018-12-01 RX ADMIN — Medication 50 MICROGRAM(S): at 05:57

## 2018-12-01 RX ADMIN — POLYETHYLENE GLYCOL 3350 17 GRAM(S): 17 POWDER, FOR SOLUTION ORAL at 17:08

## 2018-12-01 RX ADMIN — Medication 81 MILLIGRAM(S): at 12:35

## 2018-12-01 RX ADMIN — Medication 7 UNIT(S): at 12:33

## 2018-12-01 RX ADMIN — Medication 4: at 08:16

## 2018-12-01 RX ADMIN — ERYTHROPOIETIN 5000 UNIT(S): 10000 INJECTION, SOLUTION INTRAVENOUS; SUBCUTANEOUS at 23:14

## 2018-12-01 RX ADMIN — BUDESONIDE AND FORMOTEROL FUMARATE DIHYDRATE 2 PUFF(S): 160; 4.5 AEROSOL RESPIRATORY (INHALATION) at 22:19

## 2018-12-01 RX ADMIN — Medication 60 MILLIGRAM(S): at 06:07

## 2018-12-01 RX ADMIN — Medication 100 MILLIGRAM(S): at 05:57

## 2018-12-01 RX ADMIN — BUDESONIDE AND FORMOTEROL FUMARATE DIHYDRATE 2 PUFF(S): 160; 4.5 AEROSOL RESPIRATORY (INHALATION) at 08:42

## 2018-12-01 RX ADMIN — Medication 3 MILLILITER(S): at 16:07

## 2018-12-01 RX ADMIN — Medication 100 MILLIGRAM(S): at 17:08

## 2018-12-01 RX ADMIN — SERTRALINE 50 MILLIGRAM(S): 25 TABLET, FILM COATED ORAL at 12:35

## 2018-12-01 RX ADMIN — Medication 325 MILLIGRAM(S): at 23:15

## 2018-12-01 RX ADMIN — HEPARIN SODIUM 5000 UNIT(S): 5000 INJECTION INTRAVENOUS; SUBCUTANEOUS at 22:20

## 2018-12-01 RX ADMIN — CARVEDILOL PHOSPHATE 12.5 MILLIGRAM(S): 80 CAPSULE, EXTENDED RELEASE ORAL at 05:57

## 2018-12-01 RX ADMIN — Medication 7 UNIT(S): at 08:16

## 2018-12-01 RX ADMIN — HEPARIN SODIUM 5000 UNIT(S): 5000 INJECTION INTRAVENOUS; SUBCUTANEOUS at 14:30

## 2018-12-01 RX ADMIN — Medication 650 MILLIGRAM(S): at 05:57

## 2018-12-01 RX ADMIN — SEVELAMER CARBONATE 800 MILLIGRAM(S): 2400 POWDER, FOR SUSPENSION ORAL at 22:20

## 2018-12-01 RX ADMIN — Medication 10: at 17:06

## 2018-12-01 RX ADMIN — Medication 600 MILLIGRAM(S): at 17:08

## 2018-12-01 RX ADMIN — Medication 7 UNIT(S): at 17:06

## 2018-12-01 RX ADMIN — INSULIN GLARGINE 27 UNIT(S): 100 INJECTION, SOLUTION SUBCUTANEOUS at 23:15

## 2018-12-01 RX ADMIN — Medication 3 MILLILITER(S): at 20:04

## 2018-12-01 RX ADMIN — SEVELAMER CARBONATE 800 MILLIGRAM(S): 2400 POWDER, FOR SUSPENSION ORAL at 05:57

## 2018-12-01 RX ADMIN — SEVELAMER CARBONATE 800 MILLIGRAM(S): 2400 POWDER, FOR SUSPENSION ORAL at 14:32

## 2018-12-01 RX ADMIN — Medication 650 MILLIGRAM(S): at 22:20

## 2018-12-01 RX ADMIN — ISOSORBIDE MONONITRATE 30 MILLIGRAM(S): 60 TABLET, EXTENDED RELEASE ORAL at 12:35

## 2018-12-01 RX ADMIN — Medication 60 MILLIGRAM(S): at 14:31

## 2018-12-01 RX ADMIN — PANTOPRAZOLE SODIUM 40 MILLIGRAM(S): 20 TABLET, DELAYED RELEASE ORAL at 08:17

## 2018-12-01 RX ADMIN — Medication 60 MILLIGRAM(S): at 22:20

## 2018-12-01 RX ADMIN — Medication 600 MILLIGRAM(S): at 05:57

## 2018-12-01 RX ADMIN — POLYETHYLENE GLYCOL 3350 17 GRAM(S): 17 POWDER, FOR SOLUTION ORAL at 06:08

## 2018-12-01 NOTE — PROGRESS NOTE ADULT - ASSESSMENT
ANA HINTON 77y Female  MRN#: [id number]   CODE STATUS: Full code      SUBJECTIVE  Patient is a 77y old Female who presented with a chief complaint of shortness of breath and productive cough  Currently admitted to medicine with the primary diagnosis of acute exacerbation of COPD  Today is hospital day 5d, and this morning she is resting in bed with 2L NC and reports no overnight events. Patient states that her breathing has not improved, with frequent coughs and congestion. Patient is able to speak in full sentence without any shortness of breath. Patient denies any chest pain, abdominal pain, fever.chills, or any urinary symptoms.       OBJECTIVE  PAST MEDICAL & SURGICAL HISTORY  Dyslipidemia  Coronary artery disease  Diabetes mellitus  COPD (chronic obstructive pulmonary disease)  Dyslipidemia  Chronic kidney disease  Hypertension  H/O hernia repair  S/P CABG (coronary artery bypass graft)    ALLERGIES:  No Known Allergies    MEDICATIONS:  STANDING MEDICATIONS  ALBUTerol/ipratropium for Nebulization 3 milliLiter(s) Nebulizer every 4 hours  aspirin enteric coated 81 milliGRAM(s) Oral daily  buDESOnide 160 MICROgram(s)/formoterol 4.5 MICROgram(s) Inhaler 2 Puff(s) Inhalation two times a day  carvedilol 12.5 milliGRAM(s) Oral every 12 hours  dextrose 5%. 1000 milliLiter(s) IV Continuous <Continuous>  dextrose 50% Injectable 12.5 Gram(s) IV Push once  dextrose 50% Injectable 25 Gram(s) IV Push once  dextrose 50% Injectable 25 Gram(s) IV Push once  docusate sodium 100 milliGRAM(s) Oral two times a day  furosemide   Injectable 60 milliGRAM(s) IV Push daily  guaiFENesin  milliGRAM(s) Oral every 12 hours  heparin  Injectable 5000 Unit(s) SubCutaneous every 8 hours  influenza   Vaccine 0.5 milliLiter(s) IntraMuscular once  insulin glargine Injectable (LANTUS) 22 Unit(s) SubCutaneous at bedtime  insulin lispro (HumaLOG) corrective regimen sliding scale   SubCutaneous three times a day before meals  insulin lispro Injectable (HumaLOG) 7 Unit(s) SubCutaneous three times a day before meals  isosorbide   mononitrate ER Tablet (IMDUR) 30 milliGRAM(s) Oral daily  levoFLOXacin  Tablet 500 milliGRAM(s) Oral every 48 hours  levothyroxine 50 MICROGram(s) Oral daily  methylPREDNISolone sodium succinate Injectable 60 milliGRAM(s) IV Push every 8 hours  pantoprazole    Tablet 40 milliGRAM(s) Oral before breakfast  polyethylene glycol 3350 17 Gram(s) Oral two times a day  senna 2 Tablet(s) Oral at bedtime  sertraline 50 milliGRAM(s) Oral daily  sevelamer hydrochloride 800 milliGRAM(s) Oral every 8 hours  sodium bicarbonate 650 milliGRAM(s) Oral three times a day    PRN MEDICATIONS  acetaminophen   Tablet .. 650 milliGRAM(s) Oral every 6 hours PRN  ALBUTerol/ipratropium for Nebulization 3 milliLiter(s) Nebulizer every 6 hours PRN  dextrose 40% Gel 15 Gram(s) Oral once PRN  glucagon  Injectable 1 milliGRAM(s) IntraMuscular once PRN  ondansetron Injectable 4 milliGRAM(s) IV Push every 6 hours PRN      VITAL SIGNS: Last 24 Hours  T(C): 36.5 (01 Dec 2018 05:19), Max: 37.1 (30 Nov 2018 20:42)  T(F): 97.7 (01 Dec 2018 05:19), Max: 98.8 (30 Nov 2018 20:42)  HR: 70 (01 Dec 2018 05:19) (68 - 78)  BP: 158/70 (01 Dec 2018 05:19) (149/67 - 191/85)  BP(mean): 96 (01 Dec 2018 02:04) (96 - 96)  RR: 20 (01 Dec 2018 05:19) (18 - 20)  SpO2: 96% (30 Nov 2018 20:05) (95% - 96%)    LABS:                        8.7    11.67 )-----------( 137      ( 30 Nov 2018 09:01 )             28.3     11-30    142  |  103  |  112<HH>  ----------------------------<  250<H>  5.1<H>   |  22  |  5.3<HH>    Ca    9.3      30 Nov 2018 09:01  Mg     1.8     11-30    RADIOLOGY:  < from: Xray Chest 1 View- PORTABLE-Urgent (11.30.18 @ 13:51) >  Small pleural effusions, not appreciated on prior exam.    < end of copied text >      PHYSICAL EXAM:  GENERAL: well-developed, NAD, still gets short of breath during interview  HEENT:  Atraumatic, Normocephalic. conjunctiva and sclera clear, No JVD  PULMONARY: Bilateral diffused expiratory rhonchi   CARDIOVASCULAR: Regular rate and rhythm; No murmurs  GASTROINTESTINAL: Soft, Nontender, Nondistended; Bowel sounds present  MUSCULOSKELETAL: No petal edema  NEUROLOGY: non-focal  SKIN: No rashes or lesions      ADMISSION SUMMARY  77F with PMHx COPD on home oxygen, HTN, HLD, DM, history of bypass, CKD not on dialysis presents for shortness of breath, congestion, productive cough x 4 days.    #) AHRF 2/2 COPD Exacerbation and fluid overload 2/2 CKD  - History of COPD with home O2 2L, continue O2 therapy   - ECHO shows EF 41% with moderate MVR, thickening of mitral roselia leaflets, mild pulm HTN  - Repeated CXR shows worsening pleural effusion bilaterally  - Continue IV solumedrol 60mg Q8hrs, now day 6  - Continue Levaquin 500mg Q48hrs  - Q4 nebulizers and PRN  - Continue Symbicort 160mg   - Continue IV lasix 60mg daily  - flu and RVP panel negative, pending sputum culture  - Blood cultures negative x2  - Continue bipap HS and PRN  - Pulmonary recs appreciated    #) MARTIN on CKD stage V  - BNP 10634, likely caused by volume overload 2/2 CKD  - Patient still produce ample amount of urine  - Baseline Cr 2.4 as per PMD, Cr ~5.5 since admission  - Nephro recs appreciated: Start renagel, d/c calcitriol, continue sodium bicarb, bladder scan to check PVR  - hold nephrotoxic agents  - Fluid restriction < 1L  - Daily BMP to avoid over-diuresis    #) Normocytic Anemia  - Etiologies include anemia of chronic disease (CKD) vs TEDDY vs hemolysis  - No iron deficiency anemia, , Reticulocyte 0.7%, absolute 20.9, likely 2/2 CKD    #) HTN  - Continue home meds: carvedilol, Imdur  - Hold enalapril due to elevated Cr    #) DM  - Continue insulin basal + bolus  - Monitor fingersticks, will adjust if FS persistently elevated    #) CAD s/p CABG  - c/w aspirin, imdur and Coreg      DVT ppx: heparin subQ  GI ppx: protonix  Diet: DASH/ TLC  Activity: out of bed to chair + ambulate as tolerated  Code: Full code  Dispo: acute, continue medical management ANA HINTON 77y Female  MRN#: [id number]   CODE STATUS: Full code      SUBJECTIVE  Patient is a 77y old Female who presented with a chief complaint of shortness of breath and productive cough  Currently admitted to medicine with the primary diagnosis of acute exacerbation of COPD  Today is hospital day 5d,Patient reports no overnight events. Patient states that her breathing has not improved, with frequent coughs and congestion. Patient continues to be short of breath. Patient denies any chest pain, abdominal pain, fever, chills, or any urinary symptoms.       OBJECTIVE  PAST MEDICAL & SURGICAL HISTORY  Dyslipidemia  Coronary artery disease  Diabetes mellitus  COPD (chronic obstructive pulmonary disease)  Dyslipidemia  Chronic kidney disease  Hypertension  H/O hernia repair  S/P CABG (coronary artery bypass graft)    ALLERGIES:  No Known Allergies    MEDICATIONS:  STANDING MEDICATIONS  ALBUTerol/ipratropium for Nebulization 3 milliLiter(s) Nebulizer every 4 hours  aspirin enteric coated 81 milliGRAM(s) Oral daily  buDESOnide 160 MICROgram(s)/formoterol 4.5 MICROgram(s) Inhaler 2 Puff(s) Inhalation two times a day  carvedilol 12.5 milliGRAM(s) Oral every 12 hours  dextrose 5%. 1000 milliLiter(s) IV Continuous <Continuous>  dextrose 50% Injectable 12.5 Gram(s) IV Push once  dextrose 50% Injectable 25 Gram(s) IV Push once  dextrose 50% Injectable 25 Gram(s) IV Push once  docusate sodium 100 milliGRAM(s) Oral two times a day  furosemide   Injectable 60 milliGRAM(s) IV Push daily  guaiFENesin  milliGRAM(s) Oral every 12 hours  heparin  Injectable 5000 Unit(s) SubCutaneous every 8 hours  influenza   Vaccine 0.5 milliLiter(s) IntraMuscular once  insulin glargine Injectable (LANTUS) 22 Unit(s) SubCutaneous at bedtime  insulin lispro (HumaLOG) corrective regimen sliding scale   SubCutaneous three times a day before meals  insulin lispro Injectable (HumaLOG) 7 Unit(s) SubCutaneous three times a day before meals  isosorbide   mononitrate ER Tablet (IMDUR) 30 milliGRAM(s) Oral daily  levoFLOXacin  Tablet 500 milliGRAM(s) Oral every 48 hours  levothyroxine 50 MICROGram(s) Oral daily  methylPREDNISolone sodium succinate Injectable 60 milliGRAM(s) IV Push every 8 hours  pantoprazole    Tablet 40 milliGRAM(s) Oral before breakfast  polyethylene glycol 3350 17 Gram(s) Oral two times a day  senna 2 Tablet(s) Oral at bedtime  sertraline 50 milliGRAM(s) Oral daily  sevelamer hydrochloride 800 milliGRAM(s) Oral every 8 hours  sodium bicarbonate 650 milliGRAM(s) Oral three times a day    PRN MEDICATIONS  acetaminophen   Tablet .. 650 milliGRAM(s) Oral every 6 hours PRN  ALBUTerol/ipratropium for Nebulization 3 milliLiter(s) Nebulizer every 6 hours PRN  dextrose 40% Gel 15 Gram(s) Oral once PRN  glucagon  Injectable 1 milliGRAM(s) IntraMuscular once PRN  ondansetron Injectable 4 milliGRAM(s) IV Push every 6 hours PRN      VITAL SIGNS: Last 24 Hours  T(C): 36.5 (01 Dec 2018 05:19), Max: 37.1 (30 Nov 2018 20:42)  T(F): 97.7 (01 Dec 2018 05:19), Max: 98.8 (30 Nov 2018 20:42)  HR: 70 (01 Dec 2018 05:19) (68 - 78)  BP: 158/70 (01 Dec 2018 05:19) (149/67 - 191/85)  BP(mean): 96 (01 Dec 2018 02:04) (96 - 96)  RR: 20 (01 Dec 2018 05:19) (18 - 20)  SpO2: 96% (30 Nov 2018 20:05) (95% - 96%)    LABS:                        8.7    11.67 )-----------( 137      ( 30 Nov 2018 09:01 )             28.3     11-30    142  |  103  |  112<HH>  ----------------------------<  250<H>  5.1<H>   |  22  |  5.3<HH>    Ca    9.3      30 Nov 2018 09:01  Mg     1.8     11-30    RADIOLOGY:  < from: Xray Chest 1 View- PORTABLE-Urgent (11.30.18 @ 13:51) >  Small pleural effusions, not appreciated on prior exam.    < end of copied text >      PHYSICAL EXAM:  GENERAL: well-developed, NAD, still gets short of breath during interview  HEENT:  Atraumatic, Normocephalic. conjunctiva and sclera clear, No JVD  PULMONARY: Bilateral diffused expiratory rhonchi   CARDIOVASCULAR: Regular rate and rhythm; No murmurs  GASTROINTESTINAL: Soft, Nontender, Nondistended; Bowel sounds present  MUSCULOSKELETAL: No pedal edema  NEUROLOGY: non-focal  SKIN: No rashes or lesions      ADMISSION SUMMARY  77F with PMHx COPD on home oxygen, HTN, HLD, DM, history of bypass, CKD not on dialysis presents for shortness of breath, congestion, productive cough x 4 days.    #) Acute on chronic Hypoxic respiratory failure 2/2 COPD Exacerbation and fluid overload 2/2 CKD  - History of COPD with home O2 2L, continue O2 therapy   - ECHO shows EF 41% with moderate MVR, thickening of mitral roselia leaflets, mild pulm HTN  - Repeated CXR shows worsening pleural effusion bilaterally  - Continue IV solumedrol 60mg Q8hrs, now day 6  - Continue Levaquin 500mg Q48hrs  - Q4 nebulizers and PRN  - Continue Symbicort 160mg   - increase lasix to 40 q12  - flu and RVP panel negative, pending sputum culture  - Blood cultures negative x2  - Continue bipap HS and PRN  - Pulmonary recs appreciated    #) MARTIN on CKD stage V  - BNP 43661, likely caused by volume overload 2/2 CKD  - Patient still produce ample amount of urine  - Baseline Cr 2.4 as per PMD, Cr ~5.5 since admission  - Nephro recs appreciated: Start renagel, d/c calcitriol, continue sodium bicarb, bladder scan to check PVR. no indication for RRT, however if no improvement might need HD this admission   - hold nephrotoxic agents  - Fluid restriction < 1L, monitor I/o  - Daily BMP to avoid over-diuresis    #) Normocytic Anemia  - Etiologies include anemia of chronic disease (CKD)  - No iron deficiency anemia, , Reticulocyte 0.7%, absolute 20.9, likely 2/2 CKD  - start feso4 po q 8 ,and procrit 5000 units s/c weekly.    #) HTN  - Continue home meds: carvedilol, Imdur  - Hold enalapril due to elevated Cr    #) DM type 2 with Hyperglycemia  - increase insulin basal + bolus  - Monitor fingersticks, will adjust if FS persistently elevated    #) CAD s/p CABG  - c/w aspirin, imdur and Coreg    #) Hypothyroid  - C/w synthroid      DVT ppx: heparin subQ  GI ppx: protonix  Diet: DASH/ TLC  Activity: out of bed to chair + ambulate as tolerated  Code: Full code  Dispo: acute, continue medical management

## 2018-12-01 NOTE — PROGRESS NOTE ADULT - ASSESSMENT
respiratory failure/copd/? MARTIN/stage 5 ckd :  # creatinine stable  # non oliguric  # sono : no hydro  # increase lasix to 40 q 12  # check bladder scan for PVR  # ph noted, increase  renagel to  2 tablets q 8  # if BP remain elevated, start norvasc 5   # continue sodium bicarbonate  # h/h noted, start feso4 po q 8 ,and procrit 5000 units s/c weekly   # no indication for RRT, however if no improvement might need HD this admission   # will follow

## 2018-12-01 NOTE — PROGRESS NOTE ADULT - SUBJECTIVE AND OBJECTIVE BOX
seen and examined  no distress  on o2         Standing Inpatient Medications  ALBUTerol/ipratropium for Nebulization 3 milliLiter(s) Nebulizer every 4 hours  aspirin enteric coated 81 milliGRAM(s) Oral daily  buDESOnide 160 MICROgram(s)/formoterol 4.5 MICROgram(s) Inhaler 2 Puff(s) Inhalation two times a day  carvedilol 12.5 milliGRAM(s) Oral every 12 hours  dextrose 5%. 1000 milliLiter(s) IV Continuous <Continuous>  dextrose 50% Injectable 12.5 Gram(s) IV Push once  dextrose 50% Injectable 25 Gram(s) IV Push once  dextrose 50% Injectable 25 Gram(s) IV Push once  docusate sodium 100 milliGRAM(s) Oral two times a day  furosemide   Injectable 60 milliGRAM(s) IV Push daily  guaiFENesin  milliGRAM(s) Oral every 12 hours  heparin  Injectable 5000 Unit(s) SubCutaneous every 8 hours  influenza   Vaccine 0.5 milliLiter(s) IntraMuscular once  insulin glargine Injectable (LANTUS) 22 Unit(s) SubCutaneous at bedtime  insulin lispro (HumaLOG) corrective regimen sliding scale   SubCutaneous three times a day before meals  insulin lispro Injectable (HumaLOG) 7 Unit(s) SubCutaneous three times a day before meals  isosorbide   mononitrate ER Tablet (IMDUR) 30 milliGRAM(s) Oral daily  levoFLOXacin  Tablet 500 milliGRAM(s) Oral every 48 hours  levothyroxine 50 MICROGram(s) Oral daily  methylPREDNISolone sodium succinate Injectable 60 milliGRAM(s) IV Push every 8 hours  pantoprazole    Tablet 40 milliGRAM(s) Oral before breakfast  polyethylene glycol 3350 17 Gram(s) Oral two times a day  senna 2 Tablet(s) Oral at bedtime  sertraline 50 milliGRAM(s) Oral daily  sevelamer hydrochloride 800 milliGRAM(s) Oral every 8 hours  sodium bicarbonate 650 milliGRAM(s) Oral three times a day          VITALS/PHYSICAL EXAM  --------------------------------------------------------------------------------  T(C): 36.5 (12-01-18 @ 05:19), Max: 37.1 (11-30-18 @ 20:42)  HR: 70 (12-01-18 @ 05:19) (68 - 78)  BP: 158/70 (12-01-18 @ 05:19) (149/67 - 191/85)  RR: 20 (12-01-18 @ 05:19) (18 - 20)  SpO2: 96% (11-30-18 @ 20:05) (95% - 96%)  Wt(kg): --        11-30-18 @ 07:01  -  12-01-18 @ 07:00  --------------------------------------------------------  IN: 100 mL / OUT: 1020 mL / NET: -920 mL    12-01-18 @ 07:01  -  12-01-18 @ 09:38  --------------------------------------------------------  IN: 0 mL / OUT: 300 mL / NET: -300 mL      Physical Exam:  	Gen: on o2  	Pulm:  B/L michelle   	CV:  S1S2; no rub  	Abd: +distended      LABS/STUDIES  --------------------------------------------------------------------------------              8.6    15.01 >-----------<  143      [12-01-18 @ 07:00]              27.0     142  |  103  |  112  ----------------------------<  250      [11-30-18 @ 09:01]  5.1   |  22  |  5.3        Ca     9.3     [11-30-18 @ 09:01]      Mg     1.8     [11-30-18 @ 09:01]            Creatinine Trend:  SCr 5.3 [11-30 @ 09:01]  SCr 5.5 [11-29 @ 08:03]  SCr 5.4 [11-28 @ 07:33]  SCr 5.4 [11-27 @ 09:36]  SCr 5.3 [11-26 @ 09:30]        Iron 61, TIBC 217, %sat 28      [11-28-18 @ 07:33]  Ferritin 198      [11-28-18 @ 07:33]  PTH -- (Ca 9.0)      [11-28-18 @ 07:33]   114  HbA1c 5.8      [11-26-18 @ 09:30]

## 2018-12-02 LAB
ANION GAP SERPL CALC-SCNC: 16 MMOL/L — HIGH (ref 7–14)
BUN SERPL-MCNC: 123 MG/DL — CRITICAL HIGH (ref 10–20)
CALCIUM SERPL-MCNC: 9.2 MG/DL — SIGNIFICANT CHANGE UP (ref 8.5–10.1)
CHLORIDE SERPL-SCNC: 101 MMOL/L — SIGNIFICANT CHANGE UP (ref 98–110)
CO2 SERPL-SCNC: 24 MMOL/L — SIGNIFICANT CHANGE UP (ref 17–32)
CREAT SERPL-MCNC: 5.4 MG/DL — CRITICAL HIGH (ref 0.7–1.5)
GLUCOSE BLDC GLUCOMTR-MCNC: 247 MG/DL — HIGH (ref 70–99)
GLUCOSE BLDC GLUCOMTR-MCNC: 283 MG/DL — HIGH (ref 70–99)
GLUCOSE BLDC GLUCOMTR-MCNC: 317 MG/DL — HIGH (ref 70–99)
GLUCOSE BLDC GLUCOMTR-MCNC: 329 MG/DL — HIGH (ref 70–99)
GLUCOSE SERPL-MCNC: 307 MG/DL — HIGH (ref 70–99)
HCT VFR BLD CALC: 29 % — LOW (ref 37–47)
HGB BLD-MCNC: 9.1 G/DL — LOW (ref 12–16)
MAGNESIUM SERPL-MCNC: 1.6 MG/DL — LOW (ref 1.8–2.4)
MCHC RBC-ENTMCNC: 26.6 PG — LOW (ref 27–31)
MCHC RBC-ENTMCNC: 31.4 G/DL — LOW (ref 32–37)
MCV RBC AUTO: 84.8 FL — SIGNIFICANT CHANGE UP (ref 81–99)
NRBC # BLD: 0 /100 WBCS — SIGNIFICANT CHANGE UP (ref 0–0)
PLATELET # BLD AUTO: 146 K/UL — SIGNIFICANT CHANGE UP (ref 130–400)
POTASSIUM SERPL-MCNC: 5.1 MMOL/L — HIGH (ref 3.5–5)
POTASSIUM SERPL-SCNC: 5.1 MMOL/L — HIGH (ref 3.5–5)
RBC # BLD: 3.42 M/UL — LOW (ref 4.2–5.4)
RBC # FLD: 17.7 % — HIGH (ref 11.5–14.5)
SODIUM SERPL-SCNC: 141 MMOL/L — SIGNIFICANT CHANGE UP (ref 135–146)
WBC # BLD: 15.36 K/UL — HIGH (ref 4.8–10.8)
WBC # FLD AUTO: 15.36 K/UL — HIGH (ref 4.8–10.8)

## 2018-12-02 RX ORDER — INSULIN GLARGINE 100 [IU]/ML
32 INJECTION, SOLUTION SUBCUTANEOUS AT BEDTIME
Qty: 0 | Refills: 0 | Status: DISCONTINUED | OUTPATIENT
Start: 2018-12-02 | End: 2018-12-04

## 2018-12-02 RX ORDER — MAGNESIUM OXIDE 400 MG ORAL TABLET 241.3 MG
400 TABLET ORAL ONCE
Qty: 0 | Refills: 0 | Status: COMPLETED | OUTPATIENT
Start: 2018-12-02 | End: 2018-12-02

## 2018-12-02 RX ORDER — FUROSEMIDE 40 MG
40 TABLET ORAL EVERY 12 HOURS
Qty: 0 | Refills: 0 | Status: DISCONTINUED | OUTPATIENT
Start: 2018-12-02 | End: 2018-12-04

## 2018-12-02 RX ADMIN — Medication 3 MILLILITER(S): at 16:13

## 2018-12-02 RX ADMIN — CARVEDILOL PHOSPHATE 12.5 MILLIGRAM(S): 80 CAPSULE, EXTENDED RELEASE ORAL at 18:03

## 2018-12-02 RX ADMIN — Medication 40 MILLIGRAM(S): at 18:03

## 2018-12-02 RX ADMIN — SERTRALINE 50 MILLIGRAM(S): 25 TABLET, FILM COATED ORAL at 12:54

## 2018-12-02 RX ADMIN — Medication 600 MILLIGRAM(S): at 06:18

## 2018-12-02 RX ADMIN — CARVEDILOL PHOSPHATE 12.5 MILLIGRAM(S): 80 CAPSULE, EXTENDED RELEASE ORAL at 06:18

## 2018-12-02 RX ADMIN — Medication 60 MILLIGRAM(S): at 06:18

## 2018-12-02 RX ADMIN — Medication 100 MILLIGRAM(S): at 18:03

## 2018-12-02 RX ADMIN — Medication 50 MICROGRAM(S): at 06:17

## 2018-12-02 RX ADMIN — POLYETHYLENE GLYCOL 3350 17 GRAM(S): 17 POWDER, FOR SOLUTION ORAL at 06:18

## 2018-12-02 RX ADMIN — Medication 100 MILLIGRAM(S): at 06:18

## 2018-12-02 RX ADMIN — Medication 81 MILLIGRAM(S): at 12:54

## 2018-12-02 RX ADMIN — HEPARIN SODIUM 5000 UNIT(S): 5000 INJECTION INTRAVENOUS; SUBCUTANEOUS at 06:18

## 2018-12-02 RX ADMIN — SENNA PLUS 2 TABLET(S): 8.6 TABLET ORAL at 21:59

## 2018-12-02 RX ADMIN — Medication 325 MILLIGRAM(S): at 06:18

## 2018-12-02 RX ADMIN — MAGNESIUM OXIDE 400 MG ORAL TABLET 400 MILLIGRAM(S): 241.3 TABLET ORAL at 13:56

## 2018-12-02 RX ADMIN — Medication 600 MILLIGRAM(S): at 18:03

## 2018-12-02 RX ADMIN — Medication 325 MILLIGRAM(S): at 21:59

## 2018-12-02 RX ADMIN — Medication 650 MILLIGRAM(S): at 13:35

## 2018-12-02 RX ADMIN — SEVELAMER CARBONATE 800 MILLIGRAM(S): 2400 POWDER, FOR SUSPENSION ORAL at 13:35

## 2018-12-02 RX ADMIN — SEVELAMER CARBONATE 800 MILLIGRAM(S): 2400 POWDER, FOR SUSPENSION ORAL at 21:59

## 2018-12-02 RX ADMIN — Medication 4: at 18:04

## 2018-12-02 RX ADMIN — Medication 7 UNIT(S): at 12:54

## 2018-12-02 RX ADMIN — HEPARIN SODIUM 5000 UNIT(S): 5000 INJECTION INTRAVENOUS; SUBCUTANEOUS at 13:35

## 2018-12-02 RX ADMIN — Medication 40 MILLIGRAM(S): at 06:17

## 2018-12-02 RX ADMIN — Medication 7 UNIT(S): at 08:21

## 2018-12-02 RX ADMIN — BUDESONIDE AND FORMOTEROL FUMARATE DIHYDRATE 2 PUFF(S): 160; 4.5 AEROSOL RESPIRATORY (INHALATION) at 08:23

## 2018-12-02 RX ADMIN — Medication 8: at 08:23

## 2018-12-02 RX ADMIN — AMLODIPINE BESYLATE 5 MILLIGRAM(S): 2.5 TABLET ORAL at 06:18

## 2018-12-02 RX ADMIN — Medication 7 UNIT(S): at 18:03

## 2018-12-02 RX ADMIN — Medication 650 MILLIGRAM(S): at 21:59

## 2018-12-02 RX ADMIN — HEPARIN SODIUM 5000 UNIT(S): 5000 INJECTION INTRAVENOUS; SUBCUTANEOUS at 21:58

## 2018-12-02 RX ADMIN — Medication 3 MILLILITER(S): at 20:36

## 2018-12-02 RX ADMIN — Medication 650 MILLIGRAM(S): at 06:17

## 2018-12-02 RX ADMIN — Medication 60 MILLIGRAM(S): at 13:36

## 2018-12-02 RX ADMIN — Medication 325 MILLIGRAM(S): at 13:35

## 2018-12-02 RX ADMIN — Medication 3 MILLILITER(S): at 12:16

## 2018-12-02 RX ADMIN — Medication 3 MILLILITER(S): at 14:17

## 2018-12-02 RX ADMIN — ISOSORBIDE MONONITRATE 30 MILLIGRAM(S): 60 TABLET, EXTENDED RELEASE ORAL at 12:54

## 2018-12-02 RX ADMIN — SEVELAMER CARBONATE 800 MILLIGRAM(S): 2400 POWDER, FOR SUSPENSION ORAL at 06:17

## 2018-12-02 RX ADMIN — Medication 8: at 12:54

## 2018-12-02 RX ADMIN — PANTOPRAZOLE SODIUM 40 MILLIGRAM(S): 20 TABLET, DELAYED RELEASE ORAL at 06:19

## 2018-12-02 RX ADMIN — BUDESONIDE AND FORMOTEROL FUMARATE DIHYDRATE 2 PUFF(S): 160; 4.5 AEROSOL RESPIRATORY (INHALATION) at 21:49

## 2018-12-02 RX ADMIN — INSULIN GLARGINE 32 UNIT(S): 100 INJECTION, SOLUTION SUBCUTANEOUS at 21:58

## 2018-12-02 RX ADMIN — Medication 3 MILLILITER(S): at 08:31

## 2018-12-02 NOTE — PROGRESS NOTE ADULT - SUBJECTIVE AND OBJECTIVE BOX
Patient is a 77y old  Female who presents with a chief complaint of difficulty breathing (02 Dec 2018 11:53)    Patient was seen and examined.  Patient doing better today   Sob improved     PAST MEDICAL & SURGICAL HISTORY:  Dyslipidemia  Coronary artery disease  Diabetes mellitus  COPD (chronic obstructive pulmonary disease)  Dyslipidemia  Chronic kidney disease  Hypertension  H/O hernia repair  S/P CABG (coronary artery bypass graft)    Allergies  No Known Allergies    MEDICATIONS  (STANDING):  ALBUTerol/ipratropium for Nebulization 3 milliLiter(s) Nebulizer every 4 hours  amLODIPine   Tablet 5 milliGRAM(s) Oral daily  aspirin enteric coated 81 milliGRAM(s) Oral daily  buDESOnide 160 MICROgram(s)/formoterol 4.5 MICROgram(s) Inhaler 2 Puff(s) Inhalation two times a day  carvedilol 12.5 milliGRAM(s) Oral every 12 hours  dextrose 5%. 1000 milliLiter(s) (50 mL/Hr) IV Continuous <Continuous>  dextrose 50% Injectable 12.5 Gram(s) IV Push once  dextrose 50% Injectable 25 Gram(s) IV Push once  dextrose 50% Injectable 25 Gram(s) IV Push once  docusate sodium 100 milliGRAM(s) Oral two times a day  epoetin ileana Injectable 5000 Unit(s) SubCutaneous every 7 days  ferrous    sulfate 325 milliGRAM(s) Oral every 8 hours  furosemide   Injectable 40 milliGRAM(s) IV Push every 12 hours  guaiFENesin  milliGRAM(s) Oral every 12 hours  heparin  Injectable 5000 Unit(s) SubCutaneous every 8 hours  influenza   Vaccine 0.5 milliLiter(s) IntraMuscular once  insulin glargine Injectable (LANTUS) 27 Unit(s) SubCutaneous at bedtime  insulin lispro (HumaLOG) corrective regimen sliding scale   SubCutaneous three times a day before meals  insulin lispro Injectable (HumaLOG) 7 Unit(s) SubCutaneous three times a day before meals  isosorbide   mononitrate ER Tablet (IMDUR) 30 milliGRAM(s) Oral daily  levoFLOXacin  Tablet 500 milliGRAM(s) Oral every 48 hours  levothyroxine 50 MICROGram(s) Oral daily  methylPREDNISolone sodium succinate Injectable 60 milliGRAM(s) IV Push every 8 hours  pantoprazole    Tablet 40 milliGRAM(s) Oral before breakfast  polyethylene glycol 3350 17 Gram(s) Oral two times a day  senna 2 Tablet(s) Oral at bedtime  sertraline 50 milliGRAM(s) Oral daily  sevelamer hydrochloride 800 milliGRAM(s) Oral every 8 hours  sodium bicarbonate 650 milliGRAM(s) Oral three times a day    MEDICATIONS  (PRN):  acetaminophen   Tablet .. 650 milliGRAM(s) Oral every 6 hours PRN Moderate Pain (4 - 6)  ALBUTerol/ipratropium for Nebulization 3 milliLiter(s) Nebulizer every 6 hours PRN Shortness of Breath and/or Wheezing  dextrose 40% Gel 15 Gram(s) Oral once PRN Blood Glucose LESS THAN 70 milliGRAM(s)/deciliter  glucagon  Injectable 1 milliGRAM(s) IntraMuscular once PRN Glucose LESS THAN 70 milligrams/deciliter  ondansetron Injectable 4 milliGRAM(s) IV Push every 6 hours PRN Nausea and/or Vomiting    Vital Signs Last 24 Hrs  T(C): 36.4  T(F): 97.6  HR: 69  BP: 130/60  BP(mean): --  RR: 20  SpO2: --    O/E:  Awake, alert, not in distress.  HEENT: atraumatic, EOMI.  Chest: few scattered rhonchi  CVS: SIS2 +, no murmur.  P/A: Soft, BS+  CNS: non focal.  Ext: no edema feet.  Skin: no rash, no ulcers.  All systems reviewed positive findings as above.    POCT Blood Glucose.: 329 mg/dL (02 Dec 2018 12:15)  POCT Blood Glucose.: 317 mg/dL (02 Dec 2018 07:41)  POCT Blood Glucose.: 264 mg/dL (01 Dec 2018 21:33)  POCT Blood Glucose.: 385 mg/dL (01 Dec 2018 16:49)                        9.1<L>  15.36<H> )-----------( 146      ( 02 Dec 2018 09:01 )             29.0<L>                        8.6<L>  15.01<H> )-----------( 143      ( 01 Dec 2018 07:00 )             27.0<L>    12-02    141  |  101  |  123<HH>  ----------------------------<  307<H>  5.1<H>   |  24  |  5.4<HH>  12-01    141  |  102  |  117<HH>  ----------------------------<  202<H>  5.0   |  20  |  5.0<HH>    Ca    9.2      02 Dec 2018 09:01  Ca    9.5      01 Dec 2018 07:00  Mg     1.6     12-02

## 2018-12-02 NOTE — PROGRESS NOTE ADULT - ASSESSMENT
77F with PMHx COPD on home oxygen, HTN, HLD, DM, history of bypass, CKD not on dialysis presents for shortness of breath, congestion, productive cough x 4 days.    # Acute on chronic Hypoxic respiratory failure 2/2 COPD Exacerbation and fluid overload 2/2 CKD  - History of COPD with home O2 2L, continue O2 therapy   - ECHO shows EF 41% with moderate MVR, thickening of mitral roselia leaflets, mild pulm HTN  - Repeated CXR shows worsening pleural effusion bilaterally  - decrease solumedrol to 40 mg q12  - Continue Levaquin 500mg Q48hrs  - Q4 nebulizers and PRN  - Continue Symbicort 160mg   - Switch to PO  lasix  40 q12  - flu and RVP panel negative, pending sputum culture  - Blood cultures negative x2  - Continue bipap HS and PRN      # MARTIN on CKD stage V  - BNP 05154, likely caused by volume overload 2/2 CKD  - Patient still produce ample amount of urine  - Baseline Cr 2.4 as per PMD, Cr ~5.5 since admission  - Nephro recs appreciated: bladder scan to check PVR. no indication for RRT, however if no improvement might need HD this admission   - hold nephrotoxic agents  - Fluid restriction < 1L, monitor I/o  - C/w renagel  - C/w sodium bicarb  - Daily BMP to avoid over-diuresis    # Normocytic Anemia  - Etiologies include anemia of chronic disease (CKD)  - start feso4 po q 8 ,and procrit 5000 units s/c weekly.    # HTN  - C/w carvedilol, Imdur, norvasc  - Hold enalapril due to elevated Cr    # DM type 2 with Hyperglycemia- pt on steroids  - increase lantus, lispro  - Monitor fingersticks, will adjust if FS persistently elevated    # CAD s/p CABG  - c/w aspirin, imdur and Coreg    #Hypomagnesemia   - replee Magnesium    # Hypothyroid  - C/w synthroid    # GI/ DVT prophylaxis

## 2018-12-02 NOTE — PROGRESS NOTE ADULT - SUBJECTIVE AND OBJECTIVE BOX
Nephrology progress note    Patient is seen and examined, events over the last 24 h noted .  Denies SOB  Allergies:  No Known Allergies    Hospital Medications:   MEDICATIONS  (STANDING):  ALBUTerol/ipratropium for Nebulization 3 milliLiter(s) Nebulizer every 4 hours  amLODIPine   Tablet 5 milliGRAM(s) Oral daily  aspirin enteric coated 81 milliGRAM(s) Oral daily  buDESOnide 160 MICROgram(s)/formoterol 4.5 MICROgram(s) Inhaler 2 Puff(s) Inhalation two times a day  carvedilol 12.5 milliGRAM(s) Oral every 12 hours  dextrose 5%. 1000 milliLiter(s) (50 mL/Hr) IV Continuous <Continuous>  dextrose 50% Injectable 12.5 Gram(s) IV Push once  dextrose 50% Injectable 25 Gram(s) IV Push once  dextrose 50% Injectable 25 Gram(s) IV Push once  docusate sodium 100 milliGRAM(s) Oral two times a day  epoetin ileana Injectable 5000 Unit(s) SubCutaneous every 7 days  ferrous    sulfate 325 milliGRAM(s) Oral every 8 hours  furosemide   Injectable 40 milliGRAM(s) IV Push every 12 hours  guaiFENesin  milliGRAM(s) Oral every 12 hours  heparin  Injectable 5000 Unit(s) SubCutaneous every 8 hours  influenza   Vaccine 0.5 milliLiter(s) IntraMuscular once  insulin glargine Injectable (LANTUS) 27 Unit(s) SubCutaneous at bedtime  insulin lispro (HumaLOG) corrective regimen sliding scale   SubCutaneous three times a day before meals  insulin lispro Injectable (HumaLOG) 7 Unit(s) SubCutaneous three times a day before meals  isosorbide   mononitrate ER Tablet (IMDUR) 30 milliGRAM(s) Oral daily  levoFLOXacin  Tablet 500 milliGRAM(s) Oral every 48 hours  levothyroxine 50 MICROGram(s) Oral daily  methylPREDNISolone sodium succinate Injectable 60 milliGRAM(s) IV Push every 8 hours  pantoprazole    Tablet 40 milliGRAM(s) Oral before breakfast  polyethylene glycol 3350 17 Gram(s) Oral two times a day  senna 2 Tablet(s) Oral at bedtime  sertraline 50 milliGRAM(s) Oral daily  sevelamer hydrochloride 800 milliGRAM(s) Oral every 8 hours  sodium bicarbonate 650 milliGRAM(s) Oral three times a day        VITALS:  T(F): 97.6 (12-02-18 @ 05:43), Max: 97.8 (12-01-18 @ 21:00)  HR: 69 (12-02-18 @ 05:43)  BP: 130/60 (12-02-18 @ 05:43)  RR: 20 (12-02-18 @ 05:43)  SpO2: --  Wt(kg): --    11-30 @ 07:01  -  12-01 @ 07:00  --------------------------------------------------------  IN: 100 mL / OUT: 1020 mL / NET: -920 mL    12-01 @ 07:01  -  12-02 @ 07:00  --------------------------------------------------------  IN: 1130 mL / OUT: 1100 mL / NET: 30 mL          PHYSICAL EXAM:  Constitutional: NAD  Neck: No JVD  Respiratory:  wheezes+, no rales or rhonchi  Cardiovascular: S1, S2, RRR  Gastrointestinal: BS+, soft, NT/ND  Extremities: No cyanosis or clubbing. No peripheral edema  Neurological: A/O x 3  : No CVA tenderness. No tam.   Skin: No rashes  Vascular Access:    LABS:  12-02    141  |  101  |  123<HH>  ----------------------------<  307<H>  5.1<H>   |  24  |  5.4<HH>    Ca    9.2      02 Dec 2018 09:01  Mg     1.6     12-02                            9.1    15.36 )-----------( 146      ( 02 Dec 2018 09:01 )             29.0       Urine Studies:      RADIOLOGY & ADDITIONAL STUDIES:  < from: Xray Chest 1 View- PORTABLE-Urgent (11.30.18 @ 13:51) >    Small pleural effusions, not appreciated on prior exam.    < end of copied text >  < from: US Kidney and Bladder (11.26.18 @ 09:40) >    No hydronephrosis or stone in either kidney.    Bilateral renal simple cysts.    < end of copied text >

## 2018-12-03 LAB
ANION GAP SERPL CALC-SCNC: 13 MMOL/L — SIGNIFICANT CHANGE UP (ref 7–14)
BUN SERPL-MCNC: 130 MG/DL — CRITICAL HIGH (ref 10–20)
CALCIUM SERPL-MCNC: 9.1 MG/DL — SIGNIFICANT CHANGE UP (ref 8.5–10.1)
CHLORIDE SERPL-SCNC: 102 MMOL/L — SIGNIFICANT CHANGE UP (ref 98–110)
CK MB CFR SERPL CALC: 5.2 NG/ML — SIGNIFICANT CHANGE UP (ref 0.6–6.3)
CK SERPL-CCNC: 23 U/L — SIGNIFICANT CHANGE UP (ref 0–225)
CO2 SERPL-SCNC: 26 MMOL/L — SIGNIFICANT CHANGE UP (ref 17–32)
CREAT SERPL-MCNC: 5.2 MG/DL — CRITICAL HIGH (ref 0.7–1.5)
GLUCOSE BLDC GLUCOMTR-MCNC: 180 MG/DL — HIGH (ref 70–99)
GLUCOSE BLDC GLUCOMTR-MCNC: 189 MG/DL — HIGH (ref 70–99)
GLUCOSE BLDC GLUCOMTR-MCNC: 240 MG/DL — HIGH (ref 70–99)
GLUCOSE BLDC GLUCOMTR-MCNC: 255 MG/DL — HIGH (ref 70–99)
GLUCOSE SERPL-MCNC: 164 MG/DL — HIGH (ref 70–99)
HCT VFR BLD CALC: 26.9 % — LOW (ref 37–47)
HGB BLD-MCNC: 8.5 G/DL — LOW (ref 12–16)
MAGNESIUM SERPL-MCNC: 1.6 MG/DL — LOW (ref 1.8–2.4)
MCHC RBC-ENTMCNC: 26.3 PG — LOW (ref 27–31)
MCHC RBC-ENTMCNC: 31.6 G/DL — LOW (ref 32–37)
MCV RBC AUTO: 83.3 FL — SIGNIFICANT CHANGE UP (ref 81–99)
NRBC # BLD: 0 /100 WBCS — SIGNIFICANT CHANGE UP (ref 0–0)
PLATELET # BLD AUTO: 140 K/UL — SIGNIFICANT CHANGE UP (ref 130–400)
POTASSIUM SERPL-MCNC: 5.1 MMOL/L — HIGH (ref 3.5–5)
POTASSIUM SERPL-SCNC: 5.1 MMOL/L — HIGH (ref 3.5–5)
RBC # BLD: 3.23 M/UL — LOW (ref 4.2–5.4)
RBC # FLD: 17.8 % — HIGH (ref 11.5–14.5)
SODIUM SERPL-SCNC: 141 MMOL/L — SIGNIFICANT CHANGE UP (ref 135–146)
TROPONIN T SERPL-MCNC: 0.01 NG/ML — SIGNIFICANT CHANGE UP
WBC # BLD: 18.6 K/UL — HIGH (ref 4.8–10.8)
WBC # FLD AUTO: 18.6 K/UL — HIGH (ref 4.8–10.8)

## 2018-12-03 RX ORDER — ACETAMINOPHEN 500 MG
650 TABLET ORAL ONCE
Qty: 0 | Refills: 0 | Status: DISCONTINUED | OUTPATIENT
Start: 2018-12-03 | End: 2018-12-04

## 2018-12-03 RX ORDER — MAGNESIUM SULFATE 500 MG/ML
2 VIAL (ML) INJECTION EVERY 4 HOURS
Qty: 0 | Refills: 0 | Status: COMPLETED | OUTPATIENT
Start: 2018-12-03 | End: 2018-12-03

## 2018-12-03 RX ADMIN — Medication 3 MILLILITER(S): at 16:31

## 2018-12-03 RX ADMIN — Medication 81 MILLIGRAM(S): at 12:24

## 2018-12-03 RX ADMIN — Medication 40 MILLIGRAM(S): at 10:40

## 2018-12-03 RX ADMIN — Medication 600 MILLIGRAM(S): at 17:18

## 2018-12-03 RX ADMIN — BUDESONIDE AND FORMOTEROL FUMARATE DIHYDRATE 2 PUFF(S): 160; 4.5 AEROSOL RESPIRATORY (INHALATION) at 08:13

## 2018-12-03 RX ADMIN — AMLODIPINE BESYLATE 5 MILLIGRAM(S): 2.5 TABLET ORAL at 05:16

## 2018-12-03 RX ADMIN — SEVELAMER CARBONATE 800 MILLIGRAM(S): 2400 POWDER, FOR SUSPENSION ORAL at 05:15

## 2018-12-03 RX ADMIN — Medication 50 GRAM(S): at 14:11

## 2018-12-03 RX ADMIN — Medication 650 MILLIGRAM(S): at 13:43

## 2018-12-03 RX ADMIN — Medication 2: at 17:17

## 2018-12-03 RX ADMIN — HEPARIN SODIUM 5000 UNIT(S): 5000 INJECTION INTRAVENOUS; SUBCUTANEOUS at 13:43

## 2018-12-03 RX ADMIN — SEVELAMER CARBONATE 800 MILLIGRAM(S): 2400 POWDER, FOR SUSPENSION ORAL at 13:43

## 2018-12-03 RX ADMIN — SEVELAMER CARBONATE 800 MILLIGRAM(S): 2400 POWDER, FOR SUSPENSION ORAL at 22:26

## 2018-12-03 RX ADMIN — SENNA PLUS 2 TABLET(S): 8.6 TABLET ORAL at 22:26

## 2018-12-03 RX ADMIN — Medication 40 MILLIGRAM(S): at 05:21

## 2018-12-03 RX ADMIN — Medication 40 MILLIGRAM(S): at 17:17

## 2018-12-03 RX ADMIN — Medication 600 MILLIGRAM(S): at 05:15

## 2018-12-03 RX ADMIN — Medication 100 MILLIGRAM(S): at 05:16

## 2018-12-03 RX ADMIN — BUDESONIDE AND FORMOTEROL FUMARATE DIHYDRATE 2 PUFF(S): 160; 4.5 AEROSOL RESPIRATORY (INHALATION) at 22:26

## 2018-12-03 RX ADMIN — Medication 3 MILLILITER(S): at 08:11

## 2018-12-03 RX ADMIN — Medication 7 UNIT(S): at 17:17

## 2018-12-03 RX ADMIN — Medication 650 MILLIGRAM(S): at 22:26

## 2018-12-03 RX ADMIN — Medication 325 MILLIGRAM(S): at 13:43

## 2018-12-03 RX ADMIN — Medication 650 MILLIGRAM(S): at 05:15

## 2018-12-03 RX ADMIN — Medication 50 GRAM(S): at 12:24

## 2018-12-03 RX ADMIN — HEPARIN SODIUM 5000 UNIT(S): 5000 INJECTION INTRAVENOUS; SUBCUTANEOUS at 22:26

## 2018-12-03 RX ADMIN — Medication 3 MILLILITER(S): at 12:17

## 2018-12-03 RX ADMIN — CARVEDILOL PHOSPHATE 12.5 MILLIGRAM(S): 80 CAPSULE, EXTENDED RELEASE ORAL at 05:16

## 2018-12-03 RX ADMIN — Medication 7 UNIT(S): at 08:11

## 2018-12-03 RX ADMIN — Medication 3 MILLILITER(S): at 20:42

## 2018-12-03 RX ADMIN — Medication 2: at 08:11

## 2018-12-03 RX ADMIN — CARVEDILOL PHOSPHATE 12.5 MILLIGRAM(S): 80 CAPSULE, EXTENDED RELEASE ORAL at 17:17

## 2018-12-03 RX ADMIN — PANTOPRAZOLE SODIUM 40 MILLIGRAM(S): 20 TABLET, DELAYED RELEASE ORAL at 08:13

## 2018-12-03 RX ADMIN — ISOSORBIDE MONONITRATE 30 MILLIGRAM(S): 60 TABLET, EXTENDED RELEASE ORAL at 12:24

## 2018-12-03 RX ADMIN — Medication 50 MICROGRAM(S): at 05:17

## 2018-12-03 RX ADMIN — Medication 40 MILLIGRAM(S): at 05:16

## 2018-12-03 RX ADMIN — SERTRALINE 50 MILLIGRAM(S): 25 TABLET, FILM COATED ORAL at 12:24

## 2018-12-03 RX ADMIN — Medication 325 MILLIGRAM(S): at 05:16

## 2018-12-03 RX ADMIN — Medication 325 MILLIGRAM(S): at 22:26

## 2018-12-03 RX ADMIN — HEPARIN SODIUM 5000 UNIT(S): 5000 INJECTION INTRAVENOUS; SUBCUTANEOUS at 05:21

## 2018-12-03 RX ADMIN — INSULIN GLARGINE 32 UNIT(S): 100 INJECTION, SOLUTION SUBCUTANEOUS at 22:26

## 2018-12-03 NOTE — PROGRESS NOTE ADULT - SUBJECTIVE AND OBJECTIVE BOX
SUBJECTIVE:    Patient is a 77y old Female who presents with a chief complaint of difficulty breathing (03 Dec 2018 10:24)  Currently admitted to medicine with the primary diagnosis of Pneumonia  Today is hospital day 8d. This morning she is resting comfortably in bed.  over the night pt reported chest pain , ekg no changes , CE were  negative, mostly musculoskeletal.  this am pt sill complain of SOB , but she reports it is improving.    PAST MEDICAL & SURGICAL HISTORY  Dyslipidemia  Coronary artery disease  Diabetes mellitus  COPD (chronic obstructive pulmonary disease)  Dyslipidemia  Chronic kidney disease  Hypertension  H/O hernia repair  S/P CABG (coronary artery bypass graft)    SOCIAL HISTORY:  Negative for smoking/alcohol/drug use.     ALLERGIES:  No Known Allergies    MEDICATIONS:  STANDING MEDICATIONS  ALBUTerol/ipratropium for Nebulization 3 milliLiter(s) Nebulizer every 4 hours  amLODIPine   Tablet 5 milliGRAM(s) Oral daily  aspirin enteric coated 81 milliGRAM(s) Oral daily  buDESOnide 160 MICROgram(s)/formoterol 4.5 MICROgram(s) Inhaler 2 Puff(s) Inhalation two times a day  carvedilol 12.5 milliGRAM(s) Oral every 12 hours  dextrose 5%. 1000 milliLiter(s) IV Continuous <Continuous>  dextrose 50% Injectable 12.5 Gram(s) IV Push once  dextrose 50% Injectable 25 Gram(s) IV Push once  dextrose 50% Injectable 25 Gram(s) IV Push once  docusate sodium 100 milliGRAM(s) Oral two times a day  epoetin ileana Injectable 5000 Unit(s) SubCutaneous every 7 days  ferrous    sulfate 325 milliGRAM(s) Oral every 8 hours  furosemide    Tablet 40 milliGRAM(s) Oral every 12 hours  guaiFENesin  milliGRAM(s) Oral every 12 hours  heparin  Injectable 5000 Unit(s) SubCutaneous every 8 hours  influenza   Vaccine 0.5 milliLiter(s) IntraMuscular once  insulin glargine Injectable (LANTUS) 32 Unit(s) SubCutaneous at bedtime  insulin lispro (HumaLOG) corrective regimen sliding scale   SubCutaneous three times a day before meals  insulin lispro Injectable (HumaLOG) 7 Unit(s) SubCutaneous three times a day before meals  isosorbide   mononitrate ER Tablet (IMDUR) 30 milliGRAM(s) Oral daily  levoFLOXacin  Tablet 500 milliGRAM(s) Oral every 48 hours  levothyroxine 50 MICROGram(s) Oral daily  magnesium sulfate  IVPB 2 Gram(s) IV Intermittent every 4 hours  pantoprazole    Tablet 40 milliGRAM(s) Oral before breakfast  polyethylene glycol 3350 17 Gram(s) Oral two times a day  predniSONE   Tablet 40 milliGRAM(s) Oral daily  senna 2 Tablet(s) Oral at bedtime  sertraline 50 milliGRAM(s) Oral daily  sevelamer hydrochloride 800 milliGRAM(s) Oral every 8 hours  sodium bicarbonate 650 milliGRAM(s) Oral three times a day    PRN MEDICATIONS  acetaminophen   Tablet .. 650 milliGRAM(s) Oral once PRN  acetaminophen   Tablet .. 650 milliGRAM(s) Oral every 6 hours PRN  ALBUTerol/ipratropium for Nebulization 3 milliLiter(s) Nebulizer every 6 hours PRN  dextrose 40% Gel 15 Gram(s) Oral once PRN  glucagon  Injectable 1 milliGRAM(s) IntraMuscular once PRN  ondansetron Injectable 4 milliGRAM(s) IV Push every 6 hours PRN    VITALS:   T(F): 97.1  HR: 73  BP: 162/67  RR: 20  SpO2: --    LABS:                        8.5    18.60 )-----------( 140      ( 03 Dec 2018 07:40 )             26.9     12-03    141  |  102  |  130<HH>  ----------------------------<  164<H>  5.1<H>   |  26  |  5.2<HH>    Ca    9.1      03 Dec 2018 07:40  Mg     1.6     12-03            Creatine Kinase, Serum: 23 U/L (12-03-18 @ 01:24)  Troponin T, Serum: 0.01 ng/mL (12-03-18 @ 01:24)      CARDIAC MARKERS ( 03 Dec 2018 01:24 )  x     / 0.01 ng/mL / 23 U/L / x     / 5.2 ng/mL      PHYSICAL EXAM:  GEN: No acute distress , using nasal O2  LUNGS: decreased breath sounds bilaterally  HEART: S1/S2 present.   ABD: Soft, non-tender, non-distended. Bowel sounds present  EXT: NC/NC/NE/2+PP/PEGUERO  NEURO: AAOX3 , non focal

## 2018-12-03 NOTE — CONSULT NOTE ADULT - ASSESSMENT
# HTN  # COPD exacerbation  # CKD 5  # chr. anemia  # Pneumonia/acute Resp. failure  # A. stenosis  # HFrEF    - time sensitive surgery  - no ACS, no ADHF, currently in NSR, rate controlled.  - METS>4, does participate independantly in all ADL's.  - patient is moderate risk patient undergoing low risk surgery, risk of perioperative cardiac events <1%, may proceed for surgery if surgical procedure benefits>risk of surgical procedure.  - intraoperatively maintain I>O, f/u routine EKG postoperatively to monitor for perioperative cardiac events.

## 2018-12-03 NOTE — PROGRESS NOTE ADULT - SUBJECTIVE AND OBJECTIVE BOX
OVERNIGHT EVENTS: episode of CP s/p EKG, CXR 12/2 improved    Vital Signs Last 24 Hrs  T(C): 36.2 (03 Dec 2018 05:26), Max: 36.2 (03 Dec 2018 05:26)  T(F): 97.1 (03 Dec 2018 05:26), Max: 97.1 (03 Dec 2018 05:26)  HR: 73 (03 Dec 2018 05:26) (66 - 73)  BP: 162/67 (03 Dec 2018 05:26) (156/65 - 177/70)  RR: 20 (03 Dec 2018 05:26) (20 - 20)  SpO2: 97 % 2 l nc    PHYSICAL EXAMINATION:    GENERAL: chronically ill looking    HEENT: Head is normocephalic and atraumatic. Extraocular muscles are intact. Mucous membranes are moist.    NECK: Supple.    LUNGS: dec bs diffusely    HEART: Regular rate and rhythm without murmur.    ABDOMEN: Soft, nontender, and nondistended.      EXTREMITIES: Without any cyanosis, clubbing, rash, lesions or edema.    NEUROLOGIC: Grossly intact.    SKIN: No ulceration or induration present.      LABS:                        9.1    15.36 )-----------( 146      ( 02 Dec 2018 09:01 )             29.0     12-02    141  |  101  |  123<HH>  ----------------------------<  307<H>  5.1<H>   |  24  |  5.4<HH>    Ca    9.2      02 Dec 2018 09:01  Mg     1.6     12-02            CARDIAC MARKERS ( 03 Dec 2018 01:24 )  x     / 0.01 ng/mL / 23 U/L / x     / 5.2 ng/mL                  12-02-18 @ 07:01  -  12-03-18 @ 07:00  --------------------------------------------------------  IN: 1440 mL / OUT: 1040 mL / NET: 400 mL        MICROBIOLOGY:      MEDICATIONS  (STANDING):  ALBUTerol/ipratropium for Nebulization 3 milliLiter(s) Nebulizer every 4 hours  amLODIPine   Tablet 5 milliGRAM(s) Oral daily  aspirin enteric coated 81 milliGRAM(s) Oral daily  buDESOnide 160 MICROgram(s)/formoterol 4.5 MICROgram(s) Inhaler 2 Puff(s) Inhalation two times a day  carvedilol 12.5 milliGRAM(s) Oral every 12 hours  dextrose 5%. 1000 milliLiter(s) (50 mL/Hr) IV Continuous <Continuous>  dextrose 50% Injectable 12.5 Gram(s) IV Push once  dextrose 50% Injectable 25 Gram(s) IV Push once  dextrose 50% Injectable 25 Gram(s) IV Push once  docusate sodium 100 milliGRAM(s) Oral two times a day  epoetin ileana Injectable 5000 Unit(s) SubCutaneous every 7 days  ferrous    sulfate 325 milliGRAM(s) Oral every 8 hours  furosemide    Tablet 40 milliGRAM(s) Oral every 12 hours  guaiFENesin  milliGRAM(s) Oral every 12 hours  heparin  Injectable 5000 Unit(s) SubCutaneous every 8 hours  influenza   Vaccine 0.5 milliLiter(s) IntraMuscular once  insulin glargine Injectable (LANTUS) 32 Unit(s) SubCutaneous at bedtime  insulin lispro (HumaLOG) corrective regimen sliding scale   SubCutaneous three times a day before meals  insulin lispro Injectable (HumaLOG) 7 Unit(s) SubCutaneous three times a day before meals  isosorbide   mononitrate ER Tablet (IMDUR) 30 milliGRAM(s) Oral daily  levoFLOXacin  Tablet 500 milliGRAM(s) Oral every 48 hours  levothyroxine 50 MICROGram(s) Oral daily  methylPREDNISolone sodium succinate Injectable 40 milliGRAM(s) IV Push every 12 hours  pantoprazole    Tablet 40 milliGRAM(s) Oral before breakfast  polyethylene glycol 3350 17 Gram(s) Oral two times a day  senna 2 Tablet(s) Oral at bedtime  sertraline 50 milliGRAM(s) Oral daily  sevelamer hydrochloride 800 milliGRAM(s) Oral every 8 hours  sodium bicarbonate 650 milliGRAM(s) Oral three times a day    MEDICATIONS  (PRN):  acetaminophen   Tablet .. 650 milliGRAM(s) Oral once PRN Moderate Pain (4 - 6)  acetaminophen   Tablet .. 650 milliGRAM(s) Oral every 6 hours PRN Moderate Pain (4 - 6)  ALBUTerol/ipratropium for Nebulization 3 milliLiter(s) Nebulizer every 6 hours PRN Shortness of Breath and/or Wheezing  dextrose 40% Gel 15 Gram(s) Oral once PRN Blood Glucose LESS THAN 70 milliGRAM(s)/deciliter  glucagon  Injectable 1 milliGRAM(s) IntraMuscular once PRN Glucose LESS THAN 70 milligrams/deciliter  ondansetron Injectable 4 milliGRAM(s) IV Push every 6 hours PRN Nausea and/or Vomiting      RADIOLOGY & ADDITIONAL STUDIES:

## 2018-12-03 NOTE — PROGRESS NOTE ADULT - SUBJECTIVE AND OBJECTIVE BOX
Nephrology progress note    Patient was seen and examined, events over the last 24 h noted .  cr rising     Allergies:  No Known Allergies    Hospital Medications:   MEDICATIONS  (STANDING):  ALBUTerol/ipratropium for Nebulization 3 milliLiter(s) Nebulizer every 4 hours  amLODIPine   Tablet 5 milliGRAM(s) Oral daily  aspirin enteric coated 81 milliGRAM(s) Oral daily  buDESOnide 160 MICROgram(s)/formoterol 4.5 MICROgram(s) Inhaler 2 Puff(s) Inhalation two times a day  carvedilol 12.5 milliGRAM(s) Oral every 12 hours  dextrose 5%. 1000 milliLiter(s) (50 mL/Hr) IV Continuous <Continuous>  dextrose 50% Injectable 12.5 Gram(s) IV Push once  dextrose 50% Injectable 25 Gram(s) IV Push once  dextrose 50% Injectable 25 Gram(s) IV Push once  docusate sodium 100 milliGRAM(s) Oral two times a day  epoetin ileana Injectable 5000 Unit(s) SubCutaneous every 7 days  ferrous    sulfate 325 milliGRAM(s) Oral every 8 hours  furosemide    Tablet 40 milliGRAM(s) Oral every 12 hours  guaiFENesin  milliGRAM(s) Oral every 12 hours  heparin  Injectable 5000 Unit(s) SubCutaneous every 8 hours  influenza   Vaccine 0.5 milliLiter(s) IntraMuscular once  insulin glargine Injectable (LANTUS) 32 Unit(s) SubCutaneous at bedtime  insulin lispro (HumaLOG) corrective regimen sliding scale   SubCutaneous three times a day before meals  insulin lispro Injectable (HumaLOG) 7 Unit(s) SubCutaneous three times a day before meals  isosorbide   mononitrate ER Tablet (IMDUR) 30 milliGRAM(s) Oral daily  levoFLOXacin  Tablet 500 milliGRAM(s) Oral every 48 hours  levothyroxine 50 MICROGram(s) Oral daily  methylPREDNISolone sodium succinate Injectable 40 milliGRAM(s) IV Push every 12 hours  pantoprazole    Tablet 40 milliGRAM(s) Oral before breakfast  polyethylene glycol 3350 17 Gram(s) Oral two times a day  senna 2 Tablet(s) Oral at bedtime  sertraline 50 milliGRAM(s) Oral daily  sevelamer hydrochloride 800 milliGRAM(s) Oral every 8 hours  sodium bicarbonate 650 milliGRAM(s) Oral three times a day        VITALS:  T(F): 97.1 (12-03-18 @ 05:26), Max: 97.1 (12-03-18 @ 05:26)  HR: 73 (12-03-18 @ 05:26)  BP: 162/67 (12-03-18 @ 05:26)  RR: 20 (12-03-18 @ 05:26)  SpO2: --  Wt(kg): --    12-01 @ 07:01  -  12-02 @ 07:00  --------------------------------------------------------  IN: 1130 mL / OUT: 1100 mL / NET: 30 mL    12-02 @ 07:01  -  12-03 @ 07:00  --------------------------------------------------------  IN: 1440 mL / OUT: 1040 mL / NET: 400 mL          PHYSICAL EXAM:  Constitutional: NAD  HEENT: anicteric sclera, oropharynx clear, MMM  Neck: No JVD  Respiratory: CTAB, no wheezes, rales or rhonchi  Cardiovascular: S1, S2, RRR  Gastrointestinal: BS+, soft, NT/ND  Extremities: No cyanosis or clubbing. No peripheral edema  :  No tam.   Skin: No rashes    LABS:  12-02    141  |  101  |  123<HH>  ----------------------------<  307<H>  5.1<H>   |  24  |  5.4<HH>    Ca    9.2      02 Dec 2018 09:01  Mg     1.6     12-03                            8.5    18.60 )-----------( 140      ( 03 Dec 2018 07:40 )             26.9       Urine Studies:      RADIOLOGY & ADDITIONAL STUDIES:

## 2018-12-03 NOTE — CONSULT NOTE ADULT - SUBJECTIVE AND OBJECTIVE BOX
CHIEF COMPLAINT: difficulty breathing (03 Dec 2018 11:26)    HISTORY OF PRESENT ILLNESS:     78 yo female with PMHx COPD on home oxygen, HTN, HLD, DM, history of bypass, CKD not on dialysis presents for shortness of breath, congestion, productive cough x 4 days. Patient states she is home for the holidays with multiple sick contacts at home and congestion has been steadily getting worse. Patient has 2L of home oxygen at home but states she is so congested she cannot breath from her nose. Patient feels tired and weak. Has a cough with productive sputum - greenish in color. Patient/family denies fevers, chest pain, abdominal pain, nausea, vomiting, diarrhea, leg pain/swelling, changes in PO intake, changes in urine output, changes in mental status.  Baseline, patient is able to walk but has to frequently stop due to severe PAD.  She reports her vascular surgeon says she's too high risk for surgery.      In ED, pt was found to be saturating at high 70's on 4L of NC and she was placed on bipap - saturating at 100%. (25 Nov 2018 20:21)    PAST MEDICAL & SURGICAL HISTORY:  Dyslipidemia  Coronary artery disease  Diabetes mellitus  COPD (chronic obstructive pulmonary disease)  Dyslipidemia  Chronic kidney disease  Hypertension  H/O hernia repair  S/P CABG (coronary artery bypass graft)    FAMILY HISTORY:  Family history of diabetes mellitus (Mother)    Allergies  No Known Allergies    Home Medications:  Aspirin Enteric Coated 81 mg oral delayed release tablet: 1 tab(s) orally once a day (25 Nov 2018 20:26)  budesonide-formoterol 160 mcg-4.5 mcg/inh inhalation aerosol: 2 puff(s) inhaled 2 times a day (25 Nov 2018 20:26)  calcitriol 0.25 mcg oral capsule: 1 cap(s) orally once a day (25 Nov 2018 20:26)  carvedilol 12.5 mg oral tablet: 1 tab(s) orally 2 times a day (25 Nov 2018 20:26)  enalapril 10 mg oral tablet:  (25 Nov 2018 20:26)  furosemide 20 mg oral tablet: 1 tab(s) orally once a day (25 Nov 2018 20:26)  glipiZIDE 5 mg oral tablet:  (25 Nov 2018 20:26)  insulin glargine 100 units/mL subcutaneous solution:  (25 Nov 2018 20:26)  isosorbide mononitrate 30 mg oral tablet, extended release: 1 tab(s) orally once a day (in the morning) (25 Nov 2018 20:26)  levothyroxine 50 mcg (0.05 mg) oral tablet: 1 tab(s) orally once a day (25 Nov 2018 20:26)  sertraline 50 mg oral tablet: 1 tab(s) orally once a day (25 Nov 2018 20:26)  SITagliptin 25 mg oral tablet: 1 tab(s) orally once a day (25 Nov 2018 20:26)    MEDICATIONS  (STANDING):  ALBUTerol/ipratropium for Nebulization 3 milliLiter(s) Nebulizer every 4 hours  amLODIPine   Tablet 5 milliGRAM(s) Oral daily  aspirin enteric coated 81 milliGRAM(s) Oral daily  buDESOnide 160 MICROgram(s)/formoterol 4.5 MICROgram(s) Inhaler 2 Puff(s) Inhalation two times a day  carvedilol 12.5 milliGRAM(s) Oral every 12 hours  dextrose 5%. 1000 milliLiter(s) (50 mL/Hr) IV Continuous <Continuous>  dextrose 50% Injectable 12.5 Gram(s) IV Push once  dextrose 50% Injectable 25 Gram(s) IV Push once  dextrose 50% Injectable 25 Gram(s) IV Push once  docusate sodium 100 milliGRAM(s) Oral two times a day  epoetin ileana Injectable 5000 Unit(s) SubCutaneous every 7 days  ferrous    sulfate 325 milliGRAM(s) Oral every 8 hours  furosemide    Tablet 40 milliGRAM(s) Oral every 12 hours  guaiFENesin  milliGRAM(s) Oral every 12 hours  heparin  Injectable 5000 Unit(s) SubCutaneous every 8 hours  influenza   Vaccine 0.5 milliLiter(s) IntraMuscular once  insulin glargine Injectable (LANTUS) 32 Unit(s) SubCutaneous at bedtime  insulin lispro (HumaLOG) corrective regimen sliding scale   SubCutaneous three times a day before meals  insulin lispro Injectable (HumaLOG) 7 Unit(s) SubCutaneous three times a day before meals  isosorbide   mononitrate ER Tablet (IMDUR) 30 milliGRAM(s) Oral daily  levoFLOXacin  Tablet 500 milliGRAM(s) Oral every 48 hours  levothyroxine 50 MICROGram(s) Oral daily  pantoprazole    Tablet 40 milliGRAM(s) Oral before breakfast  polyethylene glycol 3350 17 Gram(s) Oral two times a day  predniSONE   Tablet 40 milliGRAM(s) Oral daily  senna 2 Tablet(s) Oral at bedtime  sertraline 50 milliGRAM(s) Oral daily  sevelamer hydrochloride 800 milliGRAM(s) Oral every 8 hours  sodium bicarbonate 650 milliGRAM(s) Oral three times a day    MEDICATIONS  (PRN):  acetaminophen   Tablet .. 650 milliGRAM(s) Oral once PRN Moderate Pain (4 - 6)  acetaminophen   Tablet .. 650 milliGRAM(s) Oral every 6 hours PRN Moderate Pain (4 - 6)  ALBUTerol/ipratropium for Nebulization 3 milliLiter(s) Nebulizer every 6 hours PRN Shortness of Breath and/or Wheezing  dextrose 40% Gel 15 Gram(s) Oral once PRN Blood Glucose LESS THAN 70 milliGRAM(s)/deciliter  glucagon  Injectable 1 milliGRAM(s) IntraMuscular once PRN Glucose LESS THAN 70 milligrams/deciliter  ondansetron Injectable 4 milliGRAM(s) IV Push every 6 hours PRN Nausea and/or Vomiting    SOCIAL HISTORY:    [  ] active smoker  [ x] non smoker -> ex- smoker  [  ] Etoh  [  ] recreational drugs    REVIEW OF SYSTEMS:  14 point ROS negative except as mentioned above in HPI    PHYSICAL EXAM:  T(C): 36.7 (12-03-18 @ 14:31), Max: 36.7 (12-03-18 @ 14:31)  HR: 60 (12-03-18 @ 14:31) (60 - 73)  BP: 185/- (12-03-18 @ 14:31) (162/67 - 185/-)  RR: 20 (12-03-18 @ 14:31) (20 - 20)  SpO2: --  Wt(kg): --  I&O's Summary    02 Dec 2018 07:01  -  03 Dec 2018 07:00  --------------------------------------------------------  IN: 1440 mL / OUT: 1040 mL / NET: 400 mL    03 Dec 2018 07:01  -  03 Dec 2018 16:09  --------------------------------------------------------  IN: 820 mL / OUT: 980 mL / NET: -160 mL    General Appearance: in NAD, on oxygen via NC	  HEENT: NC, No JVD appreciated  Cardiovascular: Normal S1 S2, No murmurs  Respiratory: b/l insp. crackles and exp. wheezing  Gastrointestinal:  Soft, Non-tender, BS +	  Neurologic: No focal deficits, AAOx3  Extremities: ROM wnl, No clubbing/cyanosis/edema  Skin: No rashes, No ecchymoses, No cyanosis  Vascular: Peripheral pulses palpable 2+ bilaterally    LABS:	 	                        8.5    18.60 )-----------( 140      ( 03 Dec 2018 07:40 )             26.9     12-03    141  |  102  |  130<HH>  ----------------------------<  164<H>  5.1<H>   |  26  |  5.2<HH>    Ca    9.1      03 Dec 2018 07:40  Mg     1.6     12-03      eGFR if Non African American: 8 mL/min/1.73M2 (12-03-18 @ 07:40)      CARDIAC MARKERS:  12-03-18 @ 01:24  TROPONIN-T  0.01 ng/mL  CKMB  5.2 ng/mL  CREATINE KINASE  23 U/L  	    ECG: < from: 12 Lead ECG (11.27.18 @ 09:10) >    Diagnosis Line Normal sinus rhythm  Inferior infarct , age undetermined  Anterolateral infarct , age undetermined  Abnormal ECG    Confirmed by HEMAL ALVA MD ([id number]) on 11/27/2018 3:25:09 PM    < end of copied text >   	  RADIOLOGY:  < from: Xray Chest 1 View- PORTABLE-Routine (12.02.18 @ 08:16) >  Impression:      Left lower lobe opacity. No pleural effusion or air leak        JASVIR DEL TORO M.D., ATTENDING RADIOLOGIST  This document has been electronically signed. Dec  2 2018  3:15PM    < end of copied text >    	    PREVIOUS DIAGNOSTIC TESTING:    [x] Echocardiogram:  < from: Transthoracic Echocardiogram (11.29.18 @ 12:30) >  Summary:   1. LV Ejection Fraction by Whipple's Method with a biplane EF of 41 %.   2. Moderate mitral annular calcification.   3. Moderate mitral valve regurgitation.   4. Thickening of the anterior and posterior mitral valve leaflets.   5. Mild-moderate tricuspid regurgitation.   6. Estimated pulmonary artery systolic pressure is 48.2 mmHg assuming a   right atrial pressure of 5 mmHg, which is consistent with mild pulmonary   hypertension.   7. Peak transaortic gradient is 24.6 mmHg, mean transaortic gradient   equals 11.4 mmHg, the calculated aortic valve area equals 1.45 cm² by the   continuity equation consistent with mild to moderate aortic stenosis.   8. Mild to moderate pulmonic valve regurgitation.   9. There is moderate aortic root calcification.  10. Spectral Doppler shows impaired relaxation pattern of left   ventricular myocardial filling (Grade I diastolic dysfunction).  11. Moderate aortic regurgitation.    < end of copied text > CHIEF COMPLAINT: difficulty breathing (03 Dec 2018 11:26)    HISTORY OF PRESENT ILLNESS:     78 yo female with PMHx of CAD s/p CABG, COPD on home oxygen, HTN, HLD, DM, CKD not on dialysis presents for shortness of breath, congestion, productive cough x 4 days. Patient states she is home for the holidays with multiple sick contacts at home and congestion has been steadily getting worse. Patient has 2L of home oxygen at home but states she is so congested she cannot breath from her nose. Patient feels tired and weak. Has a cough with productive sputum - greenish in color. Patient/family denies fevers, chest pain, abdominal pain, nausea, vomiting, diarrhea, leg pain/swelling, changes in PO intake, changes in urine output, changes in mental status.    Baseline, patient is able to walk but has to frequently stop due to severe PAD. She reports her vascular surgeon says she's too high risk for surgery.      In ED, pt was found to be saturating at high 70's on 4L of NC and she was placed on bipap - saturating at 100%. (25 Nov 2018 20:21)    EKG: NSR  CXR: LLL opacity      PAST MEDICAL & SURGICAL HISTORY:  Dyslipidemia  Coronary artery disease  Diabetes mellitus  COPD (chronic obstructive pulmonary disease)  Dyslipidemia  Chronic kidney disease  Hypertension  H/O hernia repair  S/P CABG (coronary artery bypass graft)      Family history of diabetes mellitus (Mother)  No pertinent family history of premature CAD in first degree relatives    SOCIAL HISTORY: Former smoker, Denies EtOH or drug use    Allergies  No Known Allergies      Home Medications:  Aspirin Enteric Coated 81 mg oral delayed release tablet: 1 tab(s) orally once a day (25 Nov 2018 20:26)  budesonide-formoterol 160 mcg-4.5 mcg/inh inhalation aerosol: 2 puff(s) inhaled 2 times a day (25 Nov 2018 20:26)  calcitriol 0.25 mcg oral capsule: 1 cap(s) orally once a day (25 Nov 2018 20:26)  carvedilol 12.5 mg oral tablet: 1 tab(s) orally 2 times a day (25 Nov 2018 20:26)  enalapril 10 mg oral tablet:  (25 Nov 2018 20:26)  furosemide 20 mg oral tablet: 1 tab(s) orally once a day (25 Nov 2018 20:26)  glipiZIDE 5 mg oral tablet:  (25 Nov 2018 20:26)  insulin glargine 100 units/mL subcutaneous solution:  (25 Nov 2018 20:26)  isosorbide mononitrate 30 mg oral tablet, extended release: 1 tab(s) orally once a day (in the morning) (25 Nov 2018 20:26)  levothyroxine 50 mcg (0.05 mg) oral tablet: 1 tab(s) orally once a day (25 Nov 2018 20:26)  sertraline 50 mg oral tablet: 1 tab(s) orally once a day (25 Nov 2018 20:26)  SITagliptin 25 mg oral tablet: 1 tab(s) orally once a day (25 Nov 2018 20:26)    MEDICATIONS  (STANDING):  ALBUTerol/ipratropium for Nebulization 3 milliLiter(s) Nebulizer every 4 hours  amLODIPine   Tablet 5 milliGRAM(s) Oral daily  aspirin enteric coated 81 milliGRAM(s) Oral daily  buDESOnide 160 MICROgram(s)/formoterol 4.5 MICROgram(s) Inhaler 2 Puff(s) Inhalation two times a day  carvedilol 12.5 milliGRAM(s) Oral every 12 hours  dextrose 5%. 1000 milliLiter(s) (50 mL/Hr) IV Continuous <Continuous>  dextrose 50% Injectable 12.5 Gram(s) IV Push once  dextrose 50% Injectable 25 Gram(s) IV Push once  dextrose 50% Injectable 25 Gram(s) IV Push once  docusate sodium 100 milliGRAM(s) Oral two times a day  epoetin ileana Injectable 5000 Unit(s) SubCutaneous every 7 days  ferrous    sulfate 325 milliGRAM(s) Oral every 8 hours  furosemide    Tablet 40 milliGRAM(s) Oral every 12 hours  guaiFENesin  milliGRAM(s) Oral every 12 hours  heparin  Injectable 5000 Unit(s) SubCutaneous every 8 hours  influenza   Vaccine 0.5 milliLiter(s) IntraMuscular once  insulin glargine Injectable (LANTUS) 32 Unit(s) SubCutaneous at bedtime  insulin lispro (HumaLOG) corrective regimen sliding scale   SubCutaneous three times a day before meals  insulin lispro Injectable (HumaLOG) 7 Unit(s) SubCutaneous three times a day before meals  isosorbide   mononitrate ER Tablet (IMDUR) 30 milliGRAM(s) Oral daily  levoFLOXacin  Tablet 500 milliGRAM(s) Oral every 48 hours  levothyroxine 50 MICROGram(s) Oral daily  pantoprazole    Tablet 40 milliGRAM(s) Oral before breakfast  polyethylene glycol 3350 17 Gram(s) Oral two times a day  predniSONE   Tablet 40 milliGRAM(s) Oral daily  senna 2 Tablet(s) Oral at bedtime  sertraline 50 milliGRAM(s) Oral daily  sevelamer hydrochloride 800 milliGRAM(s) Oral every 8 hours  sodium bicarbonate 650 milliGRAM(s) Oral three times a day    MEDICATIONS  (PRN):  acetaminophen   Tablet .. 650 milliGRAM(s) Oral once PRN Moderate Pain (4 - 6)  acetaminophen   Tablet .. 650 milliGRAM(s) Oral every 6 hours PRN Moderate Pain (4 - 6)  ALBUTerol/ipratropium for Nebulization 3 milliLiter(s) Nebulizer every 6 hours PRN Shortness of Breath and/or Wheezing  dextrose 40% Gel 15 Gram(s) Oral once PRN Blood Glucose LESS THAN 70 milliGRAM(s)/deciliter  glucagon  Injectable 1 milliGRAM(s) IntraMuscular once PRN Glucose LESS THAN 70 milligrams/deciliter  ondansetron Injectable 4 milliGRAM(s) IV Push every 6 hours PRN Nausea and/or Vomiting      REVIEW OF SYSTEMS:  CONSTITUTIONAL: No fever, weight loss, fatigue  NECK: No pain or stiffness  RESPIRATORY: See HPI  CARDIOVASCULAR: See HPI  GASTROINTESTINAL: No abdominal/epigastric pain, nausea, vomiting, hematemesis, diarrhea, constipation, melena or hematochezia  GENITOURINARY: No dysuria, frequency, hematuria, incontinence  NEUROLOGICAL: No headaches, memory loss, loss of strength, numbness, tremors  SKIN: No itching, burning, rashes, lesions   ENDOCRINE: No heat/cold intolerance or hair loss  MUSCULOSKELETAL: No joint pain or swelling  HEME/LYMPH: No easy bruising or bleeding gums      PHYSICAL EXAM:  T(C): 36.7 (12-03-18 @ 14:31), Max: 36.7 (12-03-18 @ 14:31)  HR: 60 (12-03-18 @ 14:31) (60 - 73)  BP: 185/- (12-03-18 @ 14:31) (162/67 - 185/-)  RR: 20 (12-03-18 @ 14:31) (20 - 20)    I&O's Summary    02 Dec 2018 07:01  -  03 Dec 2018 07:00  --------------------------------------------------------  IN: 1440 mL / OUT: 1040 mL / NET: 400 mL    03 Dec 2018 07:01  -  03 Dec 2018 16:09  --------------------------------------------------------  IN: 820 mL / OUT: 980 mL / NET: -160 mL      PHYSICAL EXAM:  General: NAD, AAOx3  HEENT: NCAT, EOMI, PERRLA  Neck: supple, no JVD  CV: RRR, S1S2 nl, no murmurs, no edema  Respiratory: b/l crackles, +wheezing  Abdomen: soft, NT/ND, +BS  Extremities: ROM nl, 2+ PP bilaterally  Neuro: Nonfocal      	                        8.5    18.60 )-----------( 140      ( 03 Dec 2018 07:40 )             26.9     12-03    141  |  102  |  130<HH>  ----------------------------<  164<H>  5.1<H>   |  26  |  5.2<HH>    Ca    9.1      03 Dec 2018 07:40  Mg     1.6     12-03      eGFR if Non African American: 8 mL/min/1.73M2 (12-03-18 @ 07:40)        Troponin T, Serum: 0.01 ng/mL (12.03.18 @ 01:24)  Troponin T, Serum: <0.01 ng/mL (11.27.18 @ 12:49)        	    ECG: < from: 12 Lead ECG (11.27.18 @ 09:10) >    Diagnosis Line Normal sinus rhythm  Inferior infarct , age undetermined  Anterolateral infarct , age undetermined  Abnormal ECG    < end of copied text >   	        < from: Xray Chest 1 View- PORTABLE-Routine (12.02.18 @ 08:16) >  Impression:      Left lower lobe opacity. No pleural effusion or air leak    < end of copied text >    	      < from: Transthoracic Echocardiogram (11.29.18 @ 12:30) >  Summary:   1. LV Ejection Fraction by Whipple's Method with a biplane EF of 41 %.   2. Moderate mitral annular calcification.   3. Moderate mitral valve regurgitation.   4. Thickening of the anterior and posterior mitral valve leaflets.   5. Mild-moderate tricuspid regurgitation.   6. Estimated pulmonary artery systolic pressure is 48.2 mmHg assuming a   right atrial pressure of 5 mmHg, which is consistent with mild pulmonary   hypertension.   7. Peak transaortic gradient is 24.6 mmHg, mean transaortic gradient   equals 11.4 mmHg, the calculated aortic valve area equals 1.45 cm² by the   continuity equation consistent with mild to moderate aortic stenosis.   8. Mild to moderate pulmonic valve regurgitation.   9. There is moderate aortic root calcification.  10. Spectral Doppler shows impaired relaxation pattern of left   ventricular myocardial filling (Grade I diastolic dysfunction).  11. Moderate aortic regurgitation.    < end of copied text >

## 2018-12-03 NOTE — CONSULT NOTE ADULT - CONSULT REASON
MARTIN on CKD
Shortness of breath
Tesio placement for dialysis
pre-operative clearance for Tesio catheter

## 2018-12-03 NOTE — PROGRESS NOTE ADULT - ASSESSMENT
Respiratory failure/copd/? MARTIN/stage 5 ckd :    - creatinine up to 5.4 today on LAsix 40 IVP q 12  - probably a littler overdiurised, last CXR 11/30 - mild PVC  - cont switch to po LAsix 40 bid if POX>95%  - Cont BiPAP at night  - repeat Bladder scan to r/o retention  - if creat cont to rise - will need HD  - sono : no hydro  - ph noted, increase  renagel to  2 tablets q 8  - continue sodium bicarbonate  - h/h noted, start feso4 po q 8 ,and procrit 5000 units s/c weekly   - no emergent need  for RRT, though will arrange for tessio to be placed tomorrow or Wed by Dr. Puga. Pt is agreeable  - for now ensure lwo K diet (pt w/ orange juice and banana at bedside )   - will follow

## 2018-12-03 NOTE — PROGRESS NOTE ADULT - ASSESSMENT
76 yo female with PMHx COPD on home oxygen, HTN, HLD, DM, history of bypass, CKD not on dialysis presents for shortness of breath, congestion, productive cough x 4 days.    1.	COPD exacerbation/Ac. Hypercapnic and Hypoxic resp failure  2.	MARTIN/CKD-V  3.	DM-2  4.	HTN/DL  5.	Hyperkalemia  6.	Anemia of Ch. disease.         PLAN:    ·	Pulm F/U noted  ·	Switch to Prednisone 40 mg po q 24h  ·	cont Symbicort twice a day  ·	Cont Levaquin 500 mg po q 48h  ·	Cont NaHCO3  ·	Cr. trending up. Renal F/U noted. May need HD. Surgery for Tesio catheter  ·	Monitor FS. Cont Insulin. Adjust the Insulin dose as needed.  ·	Follow electrolytes.  ·	Plan of care D/W the pt and her daughter on bedside.

## 2018-12-03 NOTE — CONSULT NOTE ADULT - ATTENDING COMMENTS
Pt seen and examined   above note reviwed  PT w/ advanced CKD, Baseline unknown but reports her Nephrologist [facility] has told her will need HD "soon"   Unclear if MARTIN, suspect all CKD  Though AK in settign of infection possible   - please obtain outpt labs baseline Cr would be helpful   check UA    will follow
CAD s/p CABG - Stable    Ischemic Cardiomyopathy / Moderate LV dysfunction EF ~40% - Stable and compensated on PO Lasix    Acute hypercapnic respiratory failure / COPD on home O2    CKD 5 with chronic anemia    EKG: No acute ischemia      - Intermediate cardiac risk for low-risk procedure (Tesio catheter placement)
above noted neck no mass discussed case with DR DELACRUZ

## 2018-12-03 NOTE — CHART NOTE - NSCHARTNOTEFT_GEN_A_CORE
Got called by RN at 1.15AM stating that patient complained of chest pain.     Went in to assess and examine the patient at bedside.   Stat vitals : /80, HR 71, pulse ox 97 on 2l.   Pt complained of right sided chest pain, non radiating, 5/10. Pt stated that she has had the same kind of before in the past. EKG done showed no St changes, inferior and anterior infarct ( age undetermined). chest pain was worse on palpation  ( likely musculoskeletal in origin).   Stat Cardiac enzymes were drawn.    Plan:  - tylenol  - f/u Cardiac enzymes, EKG no ST changes.   - continue to monitor.

## 2018-12-03 NOTE — CONSULT NOTE ADULT - SUBJECTIVE AND OBJECTIVE BOX
General Surgery Consultation Note  =====================================================  HPI: 77y Female  HPI:  *************    76 yo female with PMHx COPD on home oxygen, HTN, HLD, DM, history of bypass, CKD not on dialysis presents for shortness of breath, congestion, productive cough x 4 days. Patient states she is home for the holidays with multiple sick contacts at home and congestion has been steadily getting worse. Patient has 2L of home oxygen at home but states she is so congested she cannot breath from her nose. Patient feels tired and weak. Has a cough with productive sputum - greenish in color. Patient/family denies fevers, chest pain, abdominal pain, nausea, vomiting, diarrhea, leg pain/swelling, changes in PO intake, changes in urine output, changes in mental status.  Baseline, patient is able to walk but has to frequently stop due to severe PAD.  She reports her vascular surgeon says she's too high risk for surgery.      In ED, pt was found to be saturating at high 70's on 4L of NC and she was placed on bipap - saturating at 100%. (25 Nov 2018 20:21)      PAST MEDICAL & SURGICAL HISTORY:  Dyslipidemia  Coronary artery disease  Diabetes mellitus  COPD (chronic obstructive pulmonary disease)  Dyslipidemia  Chronic kidney disease  Hypertension  H/O hernia repair  S/P CABG (coronary artery bypass graft)    Home Meds:   Aspirin Enteric Coated 81 mg oral delayed release tablet: 1 tab(s) orally once a day (25 Nov 2018 20:26)  budesonide-formoterol 160 mcg-4.5 mcg/inh inhalation aerosol: 2 puff(s) inhaled 2 times a day (25 Nov 2018 20:26)  calcitriol 0.25 mcg oral capsule: 1 cap(s) orally once a day (25 Nov 2018 20:26)  carvedilol 12.5 mg oral tablet: 1 tab(s) orally 2 times a day (25 Nov 2018 20:26)  enalapril 10 mg oral tablet:  (25 Nov 2018 20:26)  furosemide 20 mg oral tablet: 1 tab(s) orally once a day (25 Nov 2018 20:26)  glipiZIDE 5 mg oral tablet:  (25 Nov 2018 20:26)  insulin glargine 100 units/mL subcutaneous solution:  (25 Nov 2018 20:26)  isosorbide mononitrate 30 mg oral tablet, extended release: 1 tab(s) orally once a day (in the morning) (25 Nov 2018 20:26)  levothyroxine 50 mcg (0.05 mg) oral tablet: 1 tab(s) orally once a day (25 Nov 2018 20:26)  sertraline 50 mg oral tablet: 1 tab(s) orally once a day (25 Nov 2018 20:26)  SITagliptin 25 mg oral tablet: 1 tab(s) orally once a day (25 Nov 2018 20:26)    Allergies:   No Known Allergies    Soc:   Advanced Directives: Presumed Full Code     ROS:    REVIEW OF SYSTEMS    [ ] A ten-point review of systems was otherwise negative except as noted.  [ ] Due to altered mental status/intubation, subjective information were not able to be obtained from the patient. History was obtained, to the extent possible, from review of the chart and collateral sources of information.      CURRENT MEDICATIONS:   --------------------------------------------------------------------------------------  Neurologic Medications  acetaminophen   Tablet .. 650 milliGRAM(s) Oral once PRN Moderate Pain (4 - 6)  acetaminophen   Tablet .. 650 milliGRAM(s) Oral every 6 hours PRN Moderate Pain (4 - 6)  ondansetron Injectable 4 milliGRAM(s) IV Push every 6 hours PRN Nausea and/or Vomiting  sertraline 50 milliGRAM(s) Oral daily    Respiratory Medications  ALBUTerol/ipratropium for Nebulization 3 milliLiter(s) Nebulizer every 6 hours PRN Shortness of Breath and/or Wheezing  ALBUTerol/ipratropium for Nebulization 3 milliLiter(s) Nebulizer every 4 hours  buDESOnide 160 MICROgram(s)/formoterol 4.5 MICROgram(s) Inhaler 2 Puff(s) Inhalation two times a day  guaiFENesin  milliGRAM(s) Oral every 12 hours    Cardiovascular Medications  amLODIPine   Tablet 5 milliGRAM(s) Oral daily  carvedilol 12.5 milliGRAM(s) Oral every 12 hours  furosemide    Tablet 40 milliGRAM(s) Oral every 12 hours  isosorbide   mononitrate ER Tablet (IMDUR) 30 milliGRAM(s) Oral daily    Gastrointestinal Medications  dextrose 5%. 1000 milliLiter(s) IV Continuous <Continuous>  docusate sodium 100 milliGRAM(s) Oral two times a day  ferrous    sulfate 325 milliGRAM(s) Oral every 8 hours  magnesium sulfate  IVPB 2 Gram(s) IV Intermittent every 4 hours  pantoprazole    Tablet 40 milliGRAM(s) Oral before breakfast  polyethylene glycol 3350 17 Gram(s) Oral two times a day  senna 2 Tablet(s) Oral at bedtime  sodium bicarbonate 650 milliGRAM(s) Oral three times a day    Genitourinary Medications    Hematologic/Oncologic Medications  aspirin enteric coated 81 milliGRAM(s) Oral daily  epoetin ileana Injectable 5000 Unit(s) SubCutaneous every 7 days  heparin  Injectable 5000 Unit(s) SubCutaneous every 8 hours  influenza   Vaccine 0.5 milliLiter(s) IntraMuscular once    Antimicrobial/Immunologic Medications  levoFLOXacin  Tablet 500 milliGRAM(s) Oral every 48 hours    Endocrine/Metabolic Medications  dextrose 40% Gel 15 Gram(s) Oral once PRN Blood Glucose LESS THAN 70 milliGRAM(s)/deciliter  dextrose 50% Injectable 12.5 Gram(s) IV Push once  dextrose 50% Injectable 25 Gram(s) IV Push once  dextrose 50% Injectable 25 Gram(s) IV Push once  glucagon  Injectable 1 milliGRAM(s) IntraMuscular once PRN Glucose LESS THAN 70 milligrams/deciliter  insulin glargine Injectable (LANTUS) 32 Unit(s) SubCutaneous at bedtime  insulin lispro (HumaLOG) corrective regimen sliding scale   SubCutaneous three times a day before meals  insulin lispro Injectable (HumaLOG) 7 Unit(s) SubCutaneous three times a day before meals  levothyroxine 50 MICROGram(s) Oral daily  predniSONE   Tablet 40 milliGRAM(s) Oral daily    Topical/Other Medications  sevelamer hydrochloride 800 milliGRAM(s) Oral every 8 hours    --------------------------------------------------------------------------------------    VITAL SIGNS, INS/OUTS (last 24 hours):  --------------------------------------------------------------------------------------  ICU Vital Signs Last 24 Hrs  T(C): 36.2 (03 Dec 2018 05:26), Max: 36.2 (03 Dec 2018 05:26)  T(F): 97.1 (03 Dec 2018 05:26), Max: 97.1 (03 Dec 2018 05:26)  HR: 73 (03 Dec 2018 05:26) (66 - 73)  BP: 162/67 (03 Dec 2018 05:26) (156/65 - 177/70)  BP(mean): --  ABP: --  ABP(mean): --  RR: 20 (03 Dec 2018 05:26) (20 - 20)  SpO2: --    I&O's Summary    02 Dec 2018 07:01  -  03 Dec 2018 07:00  --------------------------------------------------------  IN: 1440 mL / OUT: 1040 mL / NET: 400 mL      --------------------------------------------------------------------------------------  PHYSICAL EXAM  *****************  General: NAD, AAOx3, calm and cooperative  HEENT: NCAT, CHIKI, EOMI, Trachea ML, Neck supple  Cardiac: RRR S1, S2, no Murmurs, rubs or gallops  Respiratory: CTAB, normal respiratory effort, breath sounds equal BL, no wheeze, rhonchi or crackles  Abdomen: Soft, non-distended, non-tender, +bowel sounds  Musculoskeletal: Strength 5/5 BL UE/LE, ROM intact, compartments soft  Neuro: Sensation grossly intact and equal throughout, CN II-XII intact, no focal deficits  Vascular: Pulses 2+ throughout, extremities well perfused  Skin: Warm/dry, normal color, no jaundice  Incision/wound: healing well, dressings in place, clean, dry and intact    LABS  --------------------------------------------------------------------------------------  Labs:  CAPILLARY BLOOD GLUCOSE      POCT Blood Glucose.: 180 mg/dL (03 Dec 2018 07:21)  POCT Blood Glucose.: 283 mg/dL (02 Dec 2018 21:38)  POCT Blood Glucose.: 247 mg/dL (02 Dec 2018 16:44)  POCT Blood Glucose.: 329 mg/dL (02 Dec 2018 12:15)                          8.5    18.60 )-----------( 140      ( 03 Dec 2018 07:40 )             26.9         12-03    141  |  102  |  130<HH>  ----------------------------<  164<H>  5.1<H>   |  26  |  5.2<HH>      Calcium, Total Serum: 9.1 mg/dL (12-03-18 @ 07:40)      LFTs:         Coags:    CARDIAC MARKERS ( 03 Dec 2018 01:24 )  x     / 0.01 ng/mL / 23 U/L / x     / 5.2 ng/mL            --------------------------------------------------------------------------------------    OTHER LABS    IMAGING RESULTS  < from: Transthoracic Echocardiogram (11.29.18 @ 12:30) >  Summary:   1. LV Ejection Fraction by Whipple's Method with a biplane EF of 41 %.   2. Moderate mitral annular calcification.   3. Moderate mitral valve regurgitation.   4. Thickening of the anterior and posterior mitral valve leaflets.   5. Mild-moderate tricuspid regurgitation.   6. Estimated pulmonary artery systolic pressure is 48.2 mmHg assuming a   right atrial pressure of 5 mmHg, which is consistent with mild pulmonary   hypertension.   7. Peak transaortic gradient is 24.6 mmHg, mean transaortic gradient   equals 11.4 mmHg, the calculated aortic valve area equals 1.45 cm² by the   continuity equation consistent with mild to moderate aortic stenosis.   8. Mild to moderate pulmonic valve regurgitation.   9. There is moderate aortic root calcification.  10. Spectral Doppler shows impaired relaxation pattern of left   ventricular myocardial filling (Grade I diastolic dysfunction).  11. Moderate aortic regurgitation.      CXR:   < from: Xray Chest 1 View- PORTABLE-Routine (12.02.18 @ 08:16) >  Impression:    Left lower lobe opacity. No pleural effusion or air leak General Surgery Consultation Note  =====================================================  HPI: 77y Female PMH COPD on home 2L, HTN, HLD, DM, CABG, CKD, PAD, hypothyroidism not on dialysis presented to ED 11/25 with increased shortness of breath, productive cough x4d, congestion, and sick contacts with noted hypercapneic hypoxic 70's saturation on 4L NC in the ED, put on BiPap who is noted to have volume overload and CKD5 with possible overlying MARTIN in need of dialysis. Consult was placed to Dr. Puga for tesio placement for access to perform HD.   Medicine admitted and is treating for COPD exacerbation with BL lung opacities, plueral effusions, MARTIN on CKD, and nephrology and pulmonology are involved in her care.  Pulm is treating her for acute hypercapneic respiratory failure, COPD exacerbation, fluid overload and changed her solumedrol to prednisone and recommended a cardiac evaluation for a chest pain event overnight. Nephrology is treating for MARTIN with CKD5, and recommend non emergent RRT with need for Tesio for access.   Her white blood cell count is uptrending, Tmax 97.1 for 24 hours,  and she is on prednisone 40 and levaquin.       PAST MEDICAL & SURGICAL HISTORY:  Dyslipidemia  Coronary artery disease  Diabetes mellitus  COPD (chronic obstructive pulmonary disease), on home 2L   Dyslipidemia  Chronic kidney disease, not on HD, told by [facility]   CHF (41% EF)   Hypertension  H/O hernia repair  S/P CABG (coronary artery bypass graft)    Home Meds:   Aspirin Enteric Coated 81 mg oral delayed release tablet: 1 tab(s) orally once a day (25 Nov 2018 20:26)  budesonide-formoterol 160 mcg-4.5 mcg/inh inhalation aerosol: 2 puff(s) inhaled 2 times a day (25 Nov 2018 20:26)  calcitriol 0.25 mcg oral capsule: 1 cap(s) orally once a day (25 Nov 2018 20:26)  carvedilol 12.5 mg oral tablet: 1 tab(s) orally 2 times a day (25 Nov 2018 20:26)  enalapril 10 mg oral tablet:  (25 Nov 2018 20:26)  furosemide 20 mg oral tablet: 1 tab(s) orally once a day (25 Nov 2018 20:26)  glipiZIDE 5 mg oral tablet:  (25 Nov 2018 20:26)  insulin glargine 100 units/mL subcutaneous solution:  (25 Nov 2018 20:26)  isosorbide mononitrate 30 mg oral tablet, extended release: 1 tab(s) orally once a day (in the morning) (25 Nov 2018 20:26)  levothyroxine 50 mcg (0.05 mg) oral tablet: 1 tab(s) orally once a day (25 Nov 2018 20:26)  sertraline 50 mg oral tablet: 1 tab(s) orally once a day (25 Nov 2018 20:26)  SITagliptin 25 mg oral tablet: 1 tab(s) orally once a day (25 Nov 2018 20:26)    Allergies:   No Known Allergies    Soc:   Advanced Directives: Presumed Full Code     ROS:    REVIEW OF SYSTEMS    [ ] A ten-point review of systems was otherwise negative except as noted.  [ ] Due to altered mental status/intubation, subjective information were not able to be obtained from the patient. History was obtained, to the extent possible, from review of the chart and collateral sources of information.      CURRENT MEDICATIONS:   --------------------------------------------------------------------------------------  Neurologic Medications  acetaminophen   Tablet .. 650 milliGRAM(s) Oral once PRN Moderate Pain (4 - 6)  acetaminophen   Tablet .. 650 milliGRAM(s) Oral every 6 hours PRN Moderate Pain (4 - 6)  ondansetron Injectable 4 milliGRAM(s) IV Push every 6 hours PRN Nausea and/or Vomiting  sertraline 50 milliGRAM(s) Oral daily    Respiratory Medications  ALBUTerol/ipratropium for Nebulization 3 milliLiter(s) Nebulizer every 6 hours PRN Shortness of Breath and/or Wheezing  ALBUTerol/ipratropium for Nebulization 3 milliLiter(s) Nebulizer every 4 hours  buDESOnide 160 MICROgram(s)/formoterol 4.5 MICROgram(s) Inhaler 2 Puff(s) Inhalation two times a day  guaiFENesin  milliGRAM(s) Oral every 12 hours    Cardiovascular Medications  amLODIPine   Tablet 5 milliGRAM(s) Oral daily  carvedilol 12.5 milliGRAM(s) Oral every 12 hours  furosemide    Tablet 40 milliGRAM(s) Oral every 12 hours  isosorbide   mononitrate ER Tablet (IMDUR) 30 milliGRAM(s) Oral daily    Gastrointestinal Medications  dextrose 5%. 1000 milliLiter(s) IV Continuous <Continuous>  docusate sodium 100 milliGRAM(s) Oral two times a day  ferrous    sulfate 325 milliGRAM(s) Oral every 8 hours  magnesium sulfate  IVPB 2 Gram(s) IV Intermittent every 4 hours  pantoprazole    Tablet 40 milliGRAM(s) Oral before breakfast  polyethylene glycol 3350 17 Gram(s) Oral two times a day  senna 2 Tablet(s) Oral at bedtime  sodium bicarbonate 650 milliGRAM(s) Oral three times a day    Genitourinary Medications    Hematologic/Oncologic Medications  aspirin enteric coated 81 milliGRAM(s) Oral daily  epoetin ileana Injectable 5000 Unit(s) SubCutaneous every 7 days  heparin  Injectable 5000 Unit(s) SubCutaneous every 8 hours  influenza   Vaccine 0.5 milliLiter(s) IntraMuscular once    Antimicrobial/Immunologic Medications  levoFLOXacin  Tablet 500 milliGRAM(s) Oral every 48 hours    Endocrine/Metabolic Medications  dextrose 40% Gel 15 Gram(s) Oral once PRN Blood Glucose LESS THAN 70 milliGRAM(s)/deciliter  dextrose 50% Injectable 12.5 Gram(s) IV Push once  dextrose 50% Injectable 25 Gram(s) IV Push once  dextrose 50% Injectable 25 Gram(s) IV Push once  glucagon  Injectable 1 milliGRAM(s) IntraMuscular once PRN Glucose LESS THAN 70 milligrams/deciliter  insulin glargine Injectable (LANTUS) 32 Unit(s) SubCutaneous at bedtime  insulin lispro (HumaLOG) corrective regimen sliding scale   SubCutaneous three times a day before meals  insulin lispro Injectable (HumaLOG) 7 Unit(s) SubCutaneous three times a day before meals  levothyroxine 50 MICROGram(s) Oral daily  predniSONE   Tablet 40 milliGRAM(s) Oral daily    Topical/Other Medications  sevelamer hydrochloride 800 milliGRAM(s) Oral every 8 hours    --------------------------------------------------------------------------------------    VITAL SIGNS, INS/OUTS (last 24 hours):  --------------------------------------------------------------------------------------  ICU Vital Signs Last 24 Hrs  T(C): 36.2 (03 Dec 2018 05:26), Max: 36.2 (03 Dec 2018 05:26)  T(F): 97.1 (03 Dec 2018 05:26), Max: 97.1 (03 Dec 2018 05:26)  HR: 73 (03 Dec 2018 05:26) (66 - 73)  BP: 162/67 (03 Dec 2018 05:26) (156/65 - 177/70)  BP(mean): --  ABP: --  ABP(mean): --  RR: 20 (03 Dec 2018 05:26) (20 - 20)  SpO2: --    I&O's Summary    02 Dec 2018 07:01  -  03 Dec 2018 07:00  --------------------------------------------------------  IN: 1440 mL / OUT: 1040 mL / NET: 400 mL      --------------------------------------------------------------------------------------  PHYSICAL EXAM  *****************  General: NAD, AAOx3, calm and cooperative  HEENT: NCAT, CHIKI, EOMI, Trachea ML, Neck supple  Cardiac: RRR S1, S2, no Murmurs, rubs or gallops  Respiratory: CTAB, normal respiratory effort, breath sounds equal BL, no wheeze, rhonchi or crackles  Abdomen: Soft, non-distended, non-tender, +bowel sounds  Musculoskeletal: Strength 5/5 BL UE/LE, ROM intact, compartments soft  Neuro: Sensation grossly intact and equal throughout, CN II-XII intact, no focal deficits  Vascular: Pulses 2+ throughout, extremities well perfused  Skin: Warm/dry, normal color, no jaundice  Incision/wound: healing well, dressings in place, clean, dry and intact    LABS  --------------------------------------------------------------------------------------  Labs:  CAPILLARY BLOOD GLUCOSE      POCT Blood Glucose.: 180 mg/dL (03 Dec 2018 07:21)  POCT Blood Glucose.: 283 mg/dL (02 Dec 2018 21:38)  POCT Blood Glucose.: 247 mg/dL (02 Dec 2018 16:44)  POCT Blood Glucose.: 329 mg/dL (02 Dec 2018 12:15)                          8.5    18.60 )-----------( 140      ( 03 Dec 2018 07:40 )             26.9         12-03    141  |  102  |  130<HH>  ----------------------------<  164<H>  5.1<H>   |  26  |  5.2<HH>      Calcium, Total Serum: 9.1 mg/dL (12-03-18 @ 07:40)      LFTs:         Coags:    CARDIAC MARKERS ( 03 Dec 2018 01:24 )  x     / 0.01 ng/mL / 23 U/L / x     / 5.2 ng/mL            --------------------------------------------------------------------------------------    OTHER LABS    IMAGING RESULTS  < from: Transthoracic Echocardiogram (11.29.18 @ 12:30) >  Summary:   1. LV Ejection Fraction by Whipple's Method with a biplane EF of 41 %.   2. Moderate mitral annular calcification.   3. Moderate mitral valve regurgitation.   4. Thickening of the anterior and posterior mitral valve leaflets.   5. Mild-moderate tricuspid regurgitation.   6. Estimated pulmonary artery systolic pressure is 48.2 mmHg assuming a   right atrial pressure of 5 mmHg, which is consistent with mild pulmonary   hypertension.   7. Peak transaortic gradient is 24.6 mmHg, mean transaortic gradient   equals 11.4 mmHg, the calculated aortic valve area equals 1.45 cm² by the   continuity equation consistent with mild to moderate aortic stenosis.   8. Mild to moderate pulmonic valve regurgitation.   9. There is moderate aortic root calcification.  10. Spectral Doppler shows impaired relaxation pattern of left   ventricular myocardial filling (Grade I diastolic dysfunction).  11. Moderate aortic regurgitation.      CXR:   < from: Xray Chest 1 View- PORTABLE-Routine (12.02.18 @ 08:16) >  Impression:    Left lower lobe opacity. No pleural effusion or air leak General Surgery Consultation Note  =====================================================  HPI: 77y Female PMH COPD on home 2L, HTN, HLD, DM, CABG, PAD, hypothyroidism, CKD not on dialysis presented to ED 11/25 with increased shortness of breath, productive cough x4d, congestion, and sick contacts with noted hypercapneic hypoxic 70's saturation on 4L NC in the ED, put on BiPap who is noted to have volume overload and CKD5 with possible overlying MARTIN in need of dialysis. Consult was placed to Dr. Puga for tesio placement for access to perform HD.   Medicine admitted and is treating for COPD exacerbation with BL lung opacities, plueral effusions, MARTIN on CKD, and nephrology and pulmonology are involved in her care.  Pulm is treating her for acute hypercapneic respiratory failure, COPD exacerbation, fluid overload and changed her solumedrol to prednisone and recommended a cardiac evaluation for a chest pain event overnight. Nephrology is treating for AMRTIN with CKD5, and recommend non emergent RRT with need for Tesio for access.   Her white blood cell count is uptrending, Tmax 97.1 for 24 hours,  and she is on prednisone 40 and levaquin.  She also had an episode of chest pain overnight.   She states she was told by a vascular surgeon that her mortality is 80% for an endovascular intervention possibly stenting for PAD, for which she decided not to pursue. She has had a CABG and right groin hernia (? inguinal vs femoral) with no other surgeries, denies any vascular surgery or radiation.     PAST MEDICAL & SURGICAL HISTORY:  Dyslipidemia  Coronary artery disease s/p stent  Diabetes mellitus, not on insulin   COPD (chronic obstructive pulmonary disease), on home 2L   Dyslipidemia  Chronic kidney disease, not on HD, told by [facility] nephrologist would likely need HD soon  CHF (41% EF, G1DD, multiple valvular abnormalities on ECHO below in imaging)   Hypertension  anemia  MI  hypothyroidism  h/o bladder cancer " treated with tuberculosis and cow urine" instillations, no radiation or surgery   H/O hernia repair in 1968  S/P CABG (coronary artery bypass graft) 10-12yrs ago    Home Meds:   Aspirin Enteric Coated 81 mg oral delayed release tablet: 1 tab(s) orally once a day (25 Nov 2018 20:26)  budesonide-formoterol 160 mcg-4.5 mcg/inh inhalation aerosol: 2 puff(s) inhaled 2 times a day (25 Nov 2018 20:26)  calcitriol 0.25 mcg oral capsule: 1 cap(s) orally once a day (25 Nov 2018 20:26)  carvedilol 12.5 mg oral tablet: 1 tab(s) orally 2 times a day (25 Nov 2018 20:26)  enalapril 10 mg oral tablet:  (25 Nov 2018 20:26)  furosemide 20 mg oral tablet: 1 tab(s) orally once a day (25 Nov 2018 20:26)  glipiZIDE 5 mg oral tablet:  (25 Nov 2018 20:26)  insulin glargine 100 units/mL subcutaneous solution:  (25 Nov 2018 20:26)--PATIENT DENIES confirmed with daughter   isosorbide mononitrate 30 mg oral tablet, extended release: 1 tab(s) orally once a day (in the morning) (25 Nov 2018 20:26)  levothyroxine 50 mcg (0.05 mg) oral tablet: 1 tab(s) orally once a day (25 Nov 2018 20:26)  sertraline 50 mg oral tablet: 1 tab(s) orally once a day (25 Nov 2018 20:26)  SITagliptin 25 mg oral tablet: 1 tab(s) orally once a day (25 Nov 2018 20:26)  albuterol nebs PRN   nitroglycerin   atorvastatin     Allergies:   No Known Allergies    Soc: former smoker, 60yrs <1ppd, quit one year ago  Advanced Directives: confirmed full Code with patient     ROS:    REVIEW OF SYSTEMS    [x ] A ten-point review of systems was otherwise negative except as noted.  [ ] Due to altered mental status/intubation, subjective information were not able to be obtained from the patient. History was obtained, to the extent possible, from review of the chart and collateral sources of information.      CURRENT MEDICATIONS:   --------------------------------------------------------------------------------------  Neurologic Medications  acetaminophen   Tablet .. 650 milliGRAM(s) Oral once PRN Moderate Pain (4 - 6)  acetaminophen   Tablet .. 650 milliGRAM(s) Oral every 6 hours PRN Moderate Pain (4 - 6)  ondansetron Injectable 4 milliGRAM(s) IV Push every 6 hours PRN Nausea and/or Vomiting  sertraline 50 milliGRAM(s) Oral daily    Respiratory Medications  ALBUTerol/ipratropium for Nebulization 3 milliLiter(s) Nebulizer every 6 hours PRN Shortness of Breath and/or Wheezing  ALBUTerol/ipratropium for Nebulization 3 milliLiter(s) Nebulizer every 4 hours  buDESOnide 160 MICROgram(s)/formoterol 4.5 MICROgram(s) Inhaler 2 Puff(s) Inhalation two times a day  guaiFENesin  milliGRAM(s) Oral every 12 hours    Cardiovascular Medications  amLODIPine   Tablet 5 milliGRAM(s) Oral daily  carvedilol 12.5 milliGRAM(s) Oral every 12 hours  furosemide    Tablet 40 milliGRAM(s) Oral every 12 hours  isosorbide   mononitrate ER Tablet (IMDUR) 30 milliGRAM(s) Oral daily    Gastrointestinal Medications  dextrose 5%. 1000 milliLiter(s) IV Continuous <Continuous>  docusate sodium 100 milliGRAM(s) Oral two times a day  ferrous    sulfate 325 milliGRAM(s) Oral every 8 hours  magnesium sulfate  IVPB 2 Gram(s) IV Intermittent every 4 hours  pantoprazole    Tablet 40 milliGRAM(s) Oral before breakfast  polyethylene glycol 3350 17 Gram(s) Oral two times a day  senna 2 Tablet(s) Oral at bedtime  sodium bicarbonate 650 milliGRAM(s) Oral three times a day    Genitourinary Medications    Hematologic/Oncologic Medications  aspirin enteric coated 81 milliGRAM(s) Oral daily  epoetin ileana Injectable 5000 Unit(s) SubCutaneous every 7 days  heparin  Injectable 5000 Unit(s) SubCutaneous every 8 hours  influenza   Vaccine 0.5 milliLiter(s) IntraMuscular once    Antimicrobial/Immunologic Medications  levoFLOXacin  Tablet 500 milliGRAM(s) Oral every 48 hours    Endocrine/Metabolic Medications  dextrose 40% Gel 15 Gram(s) Oral once PRN Blood Glucose LESS THAN 70 milliGRAM(s)/deciliter  dextrose 50% Injectable 12.5 Gram(s) IV Push once  dextrose 50% Injectable 25 Gram(s) IV Push once  dextrose 50% Injectable 25 Gram(s) IV Push once  glucagon  Injectable 1 milliGRAM(s) IntraMuscular once PRN Glucose LESS THAN 70 milligrams/deciliter  insulin glargine Injectable (LANTUS) 32 Unit(s) SubCutaneous at bedtime  insulin lispro (HumaLOG) corrective regimen sliding scale   SubCutaneous three times a day before meals  insulin lispro Injectable (HumaLOG) 7 Unit(s) SubCutaneous three times a day before meals  levothyroxine 50 MICROGram(s) Oral daily  predniSONE   Tablet 40 milliGRAM(s) Oral daily    Topical/Other Medications  sevelamer hydrochloride 800 milliGRAM(s) Oral every 8 hours    --------------------------------------------------------------------------------------    VITAL SIGNS, INS/OUTS (last 24 hours):  --------------------------------------------------------------------------------------  ICU Vital Signs Last 24 Hrs  T(C): 36.2 (03 Dec 2018 05:26), Max: 36.2 (03 Dec 2018 05:26)  T(F): 97.1 (03 Dec 2018 05:26), Max: 97.1 (03 Dec 2018 05:26)  HR: 73 (03 Dec 2018 05:26) (66 - 73)  BP: 162/67 (03 Dec 2018 05:26) (156/65 - 177/70)  BP(mean): --  ABP: --  ABP(mean): --  RR: 20 (03 Dec 2018 05:26) (20 - 20)  SpO2: --    I&O's Summary    02 Dec 2018 07:01  -  03 Dec 2018 07:00  --------------------------------------------------------  IN: 1440 mL / OUT: 1040 mL / NET: 400 mL      --------------------------------------------------------------------------------------  PHYSICAL EXAM    General: NAD, AAOx3, calm and cooperative, sitting up at edge of bed, raspy voice, minimal sentences   HEENT: NCAT, CHIKI, EOMI, Trachea ML, Neck supple. No noted scarring  Cardiac: RRR S1, S2, mild systolic murmur  Respiratory: CTAB, normal respiratory effort, globally fair to poor aeration, with minimal wheezing on anterior chest   Abdomen: Soft, non-distended,  +bowel sounds, scattered ecchymosis across lower abdomen minimally tender   Musculoskeletal: Strength 5/5 BL UE/LE, ROM intact, compartments soft  Neuro: Sensation grossly intact and equal throughout, no focal deficits  Vascular: palpable radial pulses bl, hyperkeratotic toenails, dry   Skin: Warm/dry, normal color, no jaundice, scattered ecchymosis across low abdomen       LABS  --------------------------------------------------------------------------------------  Labs:  CAPILLARY BLOOD GLUCOSE      POCT Blood Glucose.: 180 mg/dL (03 Dec 2018 07:21)  POCT Blood Glucose.: 283 mg/dL (02 Dec 2018 21:38)  POCT Blood Glucose.: 247 mg/dL (02 Dec 2018 16:44)  POCT Blood Glucose.: 329 mg/dL (02 Dec 2018 12:15)                          8.5    18.60 )-----------( 140      ( 03 Dec 2018 07:40 )             26.9         12-03    141  |  102  |  130<HH>  ----------------------------<  164<H>  5.1<H>   |  26  |  5.2<HH>      Calcium, Total Serum: 9.1 mg/dL (12-03-18 @ 07:40)      LFTs:         Coags:    CARDIAC MARKERS ( 03 Dec 2018 01:24 )  x     / 0.01 ng/mL / 23 U/L / x     / 5.2 ng/mL            --------------------------------------------------------------------------------------    OTHER LABS    IMAGING RESULTS  < from: Transthoracic Echocardiogram (11.29.18 @ 12:30) >  Summary:   1. LV Ejection Fraction by Whipple's Method with a biplane EF of 41 %.   2. Moderate mitral annular calcification.   3. Moderate mitral valve regurgitation.   4. Thickening of the anterior and posterior mitral valve leaflets.   5. Mild-moderate tricuspid regurgitation.   6. Estimated pulmonary artery systolic pressure is 48.2 mmHg assuming a   right atrial pressure of 5 mmHg, which is consistent with mild pulmonary   hypertension.   7. Peak transaortic gradient is 24.6 mmHg, mean transaortic gradient   equals 11.4 mmHg, the calculated aortic valve area equals 1.45 cm² by the   continuity equation consistent with mild to moderate aortic stenosis.   8. Mild to moderate pulmonic valve regurgitation.   9. There is moderate aortic root calcification.  10. Spectral Doppler shows impaired relaxation pattern of left   ventricular myocardial filling (Grade I diastolic dysfunction).  11. Moderate aortic regurgitation.          CXR:   < from: Xray Chest 1 View- PORTABLE-Routine (12.02.18 @ 08:16) >  Impression:    Left lower lobe opacity. No pleural effusion or air leak

## 2018-12-03 NOTE — CONSULT NOTE ADULT - ASSESSMENT
---------------------------------------------------------------------------------------    ASSESSMENT:  77y Female ***    PLAN:   - will discuss with Dr. Puga      -------------------------------------------------------------------------------------- ---------------------------------------------------------------------------------------    ASSESSMENT:  77y Female with multiple medical comorbidities including COPD on home 2L, HTN, HLD, DM, CABG, PAD, hypothyroidism, CKD not on dialysis presented to ED 11/25 with increased shortness of breath, productive cough x4d, congestion, and sick contacts with noted hypercapneic hypoxic 70's treating for COPD exacerbation with rising white count, on antibiotics, prednisone, afebrile, with chest pain episode last night and abnormal ECHO     PLAN:   - will need cardiology clearance for risk stratification  - no need to be NPO at this time, pending clearance will possibly add on for Tesio placement on Wednesday morning. Per nephrology, can wait for medical optimization. Will need full labs including coags, cbc, bmp, type and screen active, magnesium, phosphorus; CXR and EKG, and NPO at midnight prior to the operation (Tuesday at 23: 59 pm )   - discussed with Dr. Puga      -------------------------------------------------------------------------------------- ---------------------------------------------------------------------------------------    ASSESSMENT:  77y Female with multiple medical comorbidities including COPD on home 2L, HTN, HLD, DM, CABG, PAD, hypothyroidism, CKD not on dialysis presented to ED 11/25 with increased shortness of breath, productive cough x4d, congestion, and sick contacts with noted hypercapneic hypoxic 70's treating for COPD exacerbation with rising white count, on antibiotics, prednisone, afebrile, with chest pain episode last night and abnormal ECHO     PLAN:   - will need cardiology clearance for risk stratification  - no need to be NPO at this time, pending clearance will possibly add on for Tesio placement on Wednesday morning. Per nephrology, can wait for medical optimization. Will need full labs including coags, cbc, bmp, type and screen active, magnesium, phosphorus; CXR and EKG, and NPO at midnight prior to the operation (Tuesday at 23: 59 pm )   - discussed with Dr. Puga    X [phone] Discussed with medicine resident       --------------------------------------------------------------------------------------

## 2018-12-03 NOTE — PROGRESS NOTE ADULT - ASSESSMENT
IMPRESSION:    Acute hypercapnic respiratory failure/ COPD exacerbation/ fluid overlaod  multiple co morbidities  CP    PLAN:    - change solumedrol to prednisone 40 q 24h  - cardio eval  - OOB TO chair  - HD KEEP NEG BALANCE  - BIPAP ar nite and as needed  - neb q 6 h  - DVT prophylaxis  - poor prognosis  - DNR/ DNI

## 2018-12-03 NOTE — PROGRESS NOTE ADULT - SUBJECTIVE AND OBJECTIVE BOX
ANA HINTON  77y Female    CHIEF COMPLAINT:    Patient is a 77y old  Female who presents with a chief complaint of difficulty breathing (03 Dec 2018 16:08)      INTERVAL HPI/OVERNIGHT EVENTS:    Patient seen and examined.    ROS: All other systems are negative.    Vital Signs:    T(F): 98 (12-03-18 @ 14:31), Max: 98 (12-03-18 @ 14:31)  HR: 69 (12-03-18 @ 17:15) (60 - 73)  BP: 165/70 (12-03-18 @ 17:15) (162/67 - 185/-)  RR: 20 (12-03-18 @ 14:31) (20 - 20)  SpO2: --  I&O's Summary    02 Dec 2018 07:01  -  03 Dec 2018 07:00  --------------------------------------------------------  IN: 1440 mL / OUT: 1040 mL / NET: 400 mL    03 Dec 2018 07:01  -  03 Dec 2018 17:20  --------------------------------------------------------  IN: 820 mL / OUT: 980 mL / NET: -160 mL      Daily     Daily   CAPILLARY BLOOD GLUCOSE  180 (03 Dec 2018 11:14)      POCT Blood Glucose.: 189 mg/dL (03 Dec 2018 17:06)  POCT Blood Glucose.: 240 mg/dL (03 Dec 2018 12:12)  POCT Blood Glucose.: 180 mg/dL (03 Dec 2018 07:21)  POCT Blood Glucose.: 283 mg/dL (02 Dec 2018 21:38)      PHYSICAL EXAM:    GENERAL:  NAD  SKIN: No rashes or lesions  HENT: Atrumatic. Normocephalic. PERRL. Moist membranes.  NECK: Supple, No JVD. No lymphadenopathy.  PULMONARY: CTA B/L. No wheezing. No rales  CVS: Normal S1, S2. Rate and Rythm are regular. No murmurs.  ABDOMEN/GI: Soft, Nontender, Nondistended; BS present  EXTREMITIES: Peripheral pulses intact. No edema B/L LE.  NEUROLOGIC:  No motor or sensory deficit.  PSYCH: Alert & oriented x 3    Consultant(s) Notes Reviewed:  [x ] YES  [ ] NO  Care Discussed with Consultants/Other Providers [ x] YES  [ ] NO    EKG reviewed  Telemetry reviewed    LABS:                        8.5    18.60 )-----------( 140      ( 03 Dec 2018 07:40 )             26.9     12-03    141  |  102  |  130<HH>  ----------------------------<  164<H>  5.1<H>   |  26  |  5.2<HH>    Ca    9.1      03 Dec 2018 07:40  Mg     1.6     12-03          Trop 0.01, CKMB 5.2, CK 23, 12-03-18 @ 01:24        RADIOLOGY & ADDITIONAL TESTS:      Imaging or report Personally Reviewed:  [ ] YES  [ ] NO    Medications:  Standing  ALBUTerol/ipratropium for Nebulization 3 milliLiter(s) Nebulizer every 4 hours  amLODIPine   Tablet 5 milliGRAM(s) Oral daily  aspirin enteric coated 81 milliGRAM(s) Oral daily  buDESOnide 160 MICROgram(s)/formoterol 4.5 MICROgram(s) Inhaler 2 Puff(s) Inhalation two times a day  carvedilol 12.5 milliGRAM(s) Oral every 12 hours  dextrose 5%. 1000 milliLiter(s) IV Continuous <Continuous>  dextrose 50% Injectable 12.5 Gram(s) IV Push once  dextrose 50% Injectable 25 Gram(s) IV Push once  dextrose 50% Injectable 25 Gram(s) IV Push once  docusate sodium 100 milliGRAM(s) Oral two times a day  epoetin ileana Injectable 5000 Unit(s) SubCutaneous every 7 days  ferrous    sulfate 325 milliGRAM(s) Oral every 8 hours  furosemide    Tablet 40 milliGRAM(s) Oral every 12 hours  guaiFENesin  milliGRAM(s) Oral every 12 hours  heparin  Injectable 5000 Unit(s) SubCutaneous every 8 hours  influenza   Vaccine 0.5 milliLiter(s) IntraMuscular once  insulin glargine Injectable (LANTUS) 32 Unit(s) SubCutaneous at bedtime  insulin lispro (HumaLOG) corrective regimen sliding scale   SubCutaneous three times a day before meals  insulin lispro Injectable (HumaLOG) 7 Unit(s) SubCutaneous three times a day before meals  isosorbide   mononitrate ER Tablet (IMDUR) 30 milliGRAM(s) Oral daily  levoFLOXacin  Tablet 500 milliGRAM(s) Oral every 48 hours  levothyroxine 50 MICROGram(s) Oral daily  pantoprazole    Tablet 40 milliGRAM(s) Oral before breakfast  polyethylene glycol 3350 17 Gram(s) Oral two times a day  predniSONE   Tablet 40 milliGRAM(s) Oral daily  senna 2 Tablet(s) Oral at bedtime  sertraline 50 milliGRAM(s) Oral daily  sevelamer hydrochloride 800 milliGRAM(s) Oral every 8 hours  sodium bicarbonate 650 milliGRAM(s) Oral three times a day    PRN Meds  acetaminophen   Tablet .. 650 milliGRAM(s) Oral once PRN  acetaminophen   Tablet .. 650 milliGRAM(s) Oral every 6 hours PRN  ALBUTerol/ipratropium for Nebulization 3 milliLiter(s) Nebulizer every 6 hours PRN  dextrose 40% Gel 15 Gram(s) Oral once PRN  glucagon  Injectable 1 milliGRAM(s) IntraMuscular once PRN  ondansetron Injectable 4 milliGRAM(s) IV Push every 6 hours PRN      Case discussed with resident    Care discussed with pt/family ANA HINTON  77y Female    CHIEF COMPLAINT:    Patient is a 77y old  Female who presents with a chief complaint of difficulty breathing (03 Dec 2018 16:08)      INTERVAL HPI/OVERNIGHT EVENTS:    Patient seen and examined. Feels better today. No sob. No cough. Renal function is deteriorating. May need HD.    ROS: All other systems are negative.    Vital Signs:    T(F): 98 (12-03-18 @ 14:31), Max: 98 (12-03-18 @ 14:31)  HR: 69 (12-03-18 @ 17:15) (60 - 73)  BP: 165/70 (12-03-18 @ 17:15) (162/67 - 185/-)  RR: 20 (12-03-18 @ 14:31) (20 - 20)  SpO2: --  I&O's Summary    02 Dec 2018 07:01  -  03 Dec 2018 07:00  --------------------------------------------------------  IN: 1440 mL / OUT: 1040 mL / NET: 400 mL    03 Dec 2018 07:01  -  03 Dec 2018 17:20  --------------------------------------------------------  IN: 820 mL / OUT: 980 mL / NET: -160 mL      Daily     Daily   CAPILLARY BLOOD GLUCOSE  180 (03 Dec 2018 11:14)      POCT Blood Glucose.: 189 mg/dL (03 Dec 2018 17:06)  POCT Blood Glucose.: 240 mg/dL (03 Dec 2018 12:12)  POCT Blood Glucose.: 180 mg/dL (03 Dec 2018 07:21)  POCT Blood Glucose.: 283 mg/dL (02 Dec 2018 21:38)      PHYSICAL EXAM:    GENERAL:  NAD  SKIN: No rashes or lesions  HENT: Atrumatic. Normocephalic. PERRL. Moist membranes.  NECK: Supple, No JVD. No lymphadenopathy.  PULMONARY: BS are decrease B/L. No wheezing. No rales  CVS: Normal S1, S2. Rate and Rythm are regular. No murmurs.  ABDOMEN/GI: Soft, Nontender, Nondistended; BS present  EXTREMITIES: Peripheral pulses intact. No edema B/L LE.  NEUROLOGIC:  No motor or sensory deficit.  PSYCH: Alert & oriented x 3    Consultant(s) Notes Reviewed:  [x ] YES  [ ] NO  Care Discussed with Consultants/Other Providers [ x] YES  [ ] NO      LABS:                        8.5    18.60 )-----------( 140      ( 03 Dec 2018 07:40 )             26.9     12-03    141  |  102  |  130<HH>  ----------------------------<  164<H> Creatinine Trend: 5.2<--, 5.4<--, 5.0<--, 5.3<--, 5.5<--, 5.4<--  5.1<H>   |  26  |  5.2<HH>    Ca    9.1      03 Dec 2018 07:40  Mg     1.6     12-03          Trop 0.01, CKMB 5.2, CK 23, 12-03-18 @ 01:24        RADIOLOGY & ADDITIONAL TESTS:      Imaging or report Personally Reviewed:  [ ] YES  [ ] NO    Medications:  Standing  ALBUTerol/ipratropium for Nebulization 3 milliLiter(s) Nebulizer every 4 hours  amLODIPine   Tablet 5 milliGRAM(s) Oral daily  aspirin enteric coated 81 milliGRAM(s) Oral daily  buDESOnide 160 MICROgram(s)/formoterol 4.5 MICROgram(s) Inhaler 2 Puff(s) Inhalation two times a day  carvedilol 12.5 milliGRAM(s) Oral every 12 hours  dextrose 5%. 1000 milliLiter(s) IV Continuous <Continuous>  dextrose 50% Injectable 12.5 Gram(s) IV Push once  dextrose 50% Injectable 25 Gram(s) IV Push once  dextrose 50% Injectable 25 Gram(s) IV Push once  docusate sodium 100 milliGRAM(s) Oral two times a day  epoetin ileana Injectable 5000 Unit(s) SubCutaneous every 7 days  ferrous    sulfate 325 milliGRAM(s) Oral every 8 hours  furosemide    Tablet 40 milliGRAM(s) Oral every 12 hours  guaiFENesin  milliGRAM(s) Oral every 12 hours  heparin  Injectable 5000 Unit(s) SubCutaneous every 8 hours  influenza   Vaccine 0.5 milliLiter(s) IntraMuscular once  insulin glargine Injectable (LANTUS) 32 Unit(s) SubCutaneous at bedtime  insulin lispro (HumaLOG) corrective regimen sliding scale   SubCutaneous three times a day before meals  insulin lispro Injectable (HumaLOG) 7 Unit(s) SubCutaneous three times a day before meals  isosorbide   mononitrate ER Tablet (IMDUR) 30 milliGRAM(s) Oral daily  levoFLOXacin  Tablet 500 milliGRAM(s) Oral every 48 hours  levothyroxine 50 MICROGram(s) Oral daily  pantoprazole    Tablet 40 milliGRAM(s) Oral before breakfast  polyethylene glycol 3350 17 Gram(s) Oral two times a day  predniSONE   Tablet 40 milliGRAM(s) Oral daily  senna 2 Tablet(s) Oral at bedtime  sertraline 50 milliGRAM(s) Oral daily  sevelamer hydrochloride 800 milliGRAM(s) Oral every 8 hours  sodium bicarbonate 650 milliGRAM(s) Oral three times a day    PRN Meds  acetaminophen   Tablet .. 650 milliGRAM(s) Oral once PRN  acetaminophen   Tablet .. 650 milliGRAM(s) Oral every 6 hours PRN  ALBUTerol/ipratropium for Nebulization 3 milliLiter(s) Nebulizer every 6 hours PRN  dextrose 40% Gel 15 Gram(s) Oral once PRN  glucagon  Injectable 1 milliGRAM(s) IntraMuscular once PRN  ondansetron Injectable 4 milliGRAM(s) IV Push every 6 hours PRN      Case discussed with resident    Care discussed with pt/family

## 2018-12-03 NOTE — PROGRESS NOTE ADULT - ASSESSMENT
#) Acute on chronic Hypoxic respiratory failure 2/2 COPD Exacerbation and fluid overload 2/2 CKD  - History of COPD with home O2 2L, continue O2 therapy , on NC O2  - ECHO shows EF 41% with moderate MVR, thickening of mitral roselia leaflets, mild pulm HTN  - pulm is following , change solumedrol to prednisone 40 q 24h  - Continue Levaquin 500mg Q48hrs  - Q4 nebulizers and PRN  - Continue Symbicort 160mg   - on Lasix to 40 q12  - Continue BiPAP HS and PRN      #) MARTIN on CKD stage V  - nephrology is following , pt may need HD , surgery to place Tesio tomorrow   - Baseline Cr 2.4 as per PMD, Cr ~5.5 since admission  - hold nephrotoxic agents  - Fluid restriction < 1L, monitor I/o  - Daily BMP to avoid over-diuresis    #) Normocytic Anemia  - Etiologies include anemia of chronic disease (CKD)  - No iron deficiency anemia, , Reticulocyte 0.7%, absolute 20.9, likely 2/2 CKD  - start feso4 po q 8 ,and procrit 5000 units s/c weekly.    #) HTN  - Continue home meds: carvedilol, Imdur  - Hold enalapril due to elevated Cr    #) DM type 2 with Hyperglycemia  - increase insulin basal + bolus  - Monitor fingersticks, will adjust if FS persistently elevated    #) CAD s/p CABG  - c/w aspirin, imdur and Coreg    #) Hypothyroid  - C/w synthroid      DVT ppx: heparin subQ  GI ppx: protonix  Diet: DASH/ TLC  Activity: out of bed to chair + ambulate as tolerated  Code: Full code  Dispo: acute, continue medical management #) Acute on chronic Hypoxic respiratory failure 2/2 COPD Exacerbation and fluid overload 2/2 CKD  - History of COPD with home O2 2L, continue O2 therapy , on NC O2  - ECHO shows EF 41% with moderate MVR, thickening of mitral roselia leaflets, mild pulm HTN  - pulm is following , change solumedrol to prednisone 40 q 24h  - Continue Levaquin 500mg Q48hrs  - Q4 nebulizers and PRN  - Continue Symbicort 160mg   - on Lasix to 40 q12  - Continue BiPAP HS and PRN      #) MARTIN on CKD stage V  - nephrology is following , pt may need HD , surgery to place Tesio on Wednesday , cardio consult for pre operative clearance has been placed   - Baseline Cr 2.4 as per PMD, Cr ~5.5 since admission  - hold nephrotoxic agents  - Fluid restriction < 1L, monitor I/o  - Daily BMP to avoid over-diuresis    #) Normocytic Anemia  - Etiologies include anemia of chronic disease (CKD)  - No iron deficiency anemia, , Reticulocyte 0.7%, absolute 20.9, likely 2/2 CKD  - start feso4 po q 8 ,and procrit 5000 units s/c weekly.    #) HTN  - Continue home meds: carvedilol, Imdur  - Hold enalapril due to elevated Cr    #) DM type 2 with Hyperglycemia  - increase insulin basal + bolus  - Monitor fingersticks, will adjust if FS persistently elevated    #) CAD s/p CABG  - c/w aspirin, imdur and Coreg    #) Hypothyroid  - C/w synthroid      DVT ppx: heparin subQ  GI ppx: protonix  Diet: DASH/ TLC  Activity: out of bed to chair + ambulate as tolerated  Code: Full code  Dispo: acute, continue medical management

## 2018-12-04 VITALS
SYSTOLIC BLOOD PRESSURE: 158 MMHG | DIASTOLIC BLOOD PRESSURE: 59 MMHG | RESPIRATION RATE: 18 BRPM | HEART RATE: 60 BPM | TEMPERATURE: 97 F

## 2018-12-04 LAB
ANION GAP SERPL CALC-SCNC: 15 MMOL/L — HIGH (ref 7–14)
APTT BLD: 36.1 SEC — SIGNIFICANT CHANGE UP (ref 27–39.2)
BLD GP AB SCN SERPL QL: SIGNIFICANT CHANGE UP
BUN SERPL-MCNC: 132 MG/DL — CRITICAL HIGH (ref 10–20)
CALCIUM SERPL-MCNC: 9.3 MG/DL — SIGNIFICANT CHANGE UP (ref 8.5–10.1)
CHLORIDE SERPL-SCNC: 103 MMOL/L — SIGNIFICANT CHANGE UP (ref 98–110)
CO2 SERPL-SCNC: 26 MMOL/L — SIGNIFICANT CHANGE UP (ref 17–32)
CREAT SERPL-MCNC: 4.9 MG/DL — CRITICAL HIGH (ref 0.7–1.5)
GLUCOSE BLDC GLUCOMTR-MCNC: 128 MG/DL — HIGH (ref 70–99)
GLUCOSE BLDC GLUCOMTR-MCNC: 189 MG/DL — HIGH (ref 70–99)
GLUCOSE BLDC GLUCOMTR-MCNC: 205 MG/DL — HIGH (ref 70–99)
GLUCOSE SERPL-MCNC: 119 MG/DL — HIGH (ref 70–99)
HCT VFR BLD CALC: 25.3 % — LOW (ref 37–47)
HGB BLD-MCNC: 8.1 G/DL — LOW (ref 12–16)
INR BLD: 1.17 RATIO — SIGNIFICANT CHANGE UP (ref 0.65–1.3)
MAGNESIUM SERPL-MCNC: 2.5 MG/DL — HIGH (ref 1.8–2.4)
MCHC RBC-ENTMCNC: 26.7 PG — LOW (ref 27–31)
MCHC RBC-ENTMCNC: 32 G/DL — SIGNIFICANT CHANGE UP (ref 32–37)
MCV RBC AUTO: 83.5 FL — SIGNIFICANT CHANGE UP (ref 81–99)
NRBC # BLD: 0 /100 WBCS — SIGNIFICANT CHANGE UP (ref 0–0)
PLATELET # BLD AUTO: 127 K/UL — LOW (ref 130–400)
POTASSIUM SERPL-MCNC: 5.1 MMOL/L — HIGH (ref 3.5–5)
POTASSIUM SERPL-SCNC: 5.1 MMOL/L — HIGH (ref 3.5–5)
PROTHROM AB SERPL-ACNC: 13.4 SEC — HIGH (ref 9.95–12.87)
RBC # BLD: 3.03 M/UL — LOW (ref 4.2–5.4)
RBC # FLD: 17.9 % — HIGH (ref 11.5–14.5)
SODIUM SERPL-SCNC: 144 MMOL/L — SIGNIFICANT CHANGE UP (ref 135–146)
TYPE + AB SCN PNL BLD: SIGNIFICANT CHANGE UP
WBC # BLD: 13.99 K/UL — HIGH (ref 4.8–10.8)
WBC # FLD AUTO: 13.99 K/UL — HIGH (ref 4.8–10.8)

## 2018-12-04 RX ORDER — AMLODIPINE BESYLATE 2.5 MG/1
1 TABLET ORAL
Qty: 0 | Refills: 0 | COMMUNITY
Start: 2018-12-04

## 2018-12-04 RX ORDER — DOCUSATE SODIUM 100 MG
1 CAPSULE ORAL
Qty: 0 | Refills: 0 | COMMUNITY
Start: 2018-12-04

## 2018-12-04 RX ORDER — SODIUM BICARBONATE 1 MEQ/ML
1 SYRINGE (ML) INTRAVENOUS
Qty: 90 | Refills: 0 | OUTPATIENT
Start: 2018-12-04 | End: 2019-01-02

## 2018-12-04 RX ORDER — CALCIUM ACETATE 667 MG
1 TABLET ORAL
Qty: 90 | Refills: 0 | OUTPATIENT
Start: 2018-12-04 | End: 2019-01-02

## 2018-12-04 RX ORDER — SEVELAMER CARBONATE 2400 MG/1
1600 POWDER, FOR SUSPENSION ORAL EVERY 8 HOURS
Qty: 0 | Refills: 0 | Status: DISCONTINUED | OUTPATIENT
Start: 2018-12-04 | End: 2018-12-04

## 2018-12-04 RX ORDER — SEVELAMER CARBONATE 2400 MG/1
2 POWDER, FOR SUSPENSION ORAL
Qty: 0 | Refills: 0 | COMMUNITY
Start: 2018-12-04

## 2018-12-04 RX ORDER — TIOTROPIUM BROMIDE 18 UG/1
1 CAPSULE ORAL; RESPIRATORY (INHALATION)
Qty: 0 | Refills: 0 | COMMUNITY
Start: 2018-12-04

## 2018-12-04 RX ORDER — TIOTROPIUM BROMIDE 18 UG/1
1 CAPSULE ORAL; RESPIRATORY (INHALATION) DAILY
Qty: 0 | Refills: 0 | Status: DISCONTINUED | OUTPATIENT
Start: 2018-12-04 | End: 2018-12-04

## 2018-12-04 RX ORDER — DOCUSATE SODIUM 100 MG
1 CAPSULE ORAL
Qty: 60 | Refills: 0 | OUTPATIENT
Start: 2018-12-04 | End: 2019-01-02

## 2018-12-04 RX ORDER — SODIUM BICARBONATE 1 MEQ/ML
1 SYRINGE (ML) INTRAVENOUS
Qty: 0 | Refills: 0 | COMMUNITY
Start: 2018-12-04

## 2018-12-04 RX ORDER — AMLODIPINE BESYLATE 2.5 MG/1
10 TABLET ORAL DAILY
Qty: 0 | Refills: 0 | Status: DISCONTINUED | OUTPATIENT
Start: 2018-12-04 | End: 2018-12-04

## 2018-12-04 RX ORDER — FERROUS SULFATE 325(65) MG
1 TABLET ORAL
Qty: 90 | Refills: 0 | OUTPATIENT
Start: 2018-12-04 | End: 2019-01-02

## 2018-12-04 RX ORDER — AMLODIPINE BESYLATE 2.5 MG/1
1 TABLET ORAL
Qty: 30 | Refills: 0 | OUTPATIENT
Start: 2018-12-04 | End: 2019-01-02

## 2018-12-04 RX ORDER — SEVELAMER CARBONATE 2400 MG/1
2 POWDER, FOR SUSPENSION ORAL
Qty: 180 | Refills: 0 | OUTPATIENT
Start: 2018-12-04 | End: 2019-01-02

## 2018-12-04 RX ADMIN — Medication 3 MILLILITER(S): at 19:41

## 2018-12-04 RX ADMIN — HEPARIN SODIUM 5000 UNIT(S): 5000 INJECTION INTRAVENOUS; SUBCUTANEOUS at 13:02

## 2018-12-04 RX ADMIN — Medication 100 MILLIGRAM(S): at 06:08

## 2018-12-04 RX ADMIN — SERTRALINE 50 MILLIGRAM(S): 25 TABLET, FILM COATED ORAL at 12:07

## 2018-12-04 RX ADMIN — SEVELAMER CARBONATE 800 MILLIGRAM(S): 2400 POWDER, FOR SUSPENSION ORAL at 06:09

## 2018-12-04 RX ADMIN — AMLODIPINE BESYLATE 5 MILLIGRAM(S): 2.5 TABLET ORAL at 06:08

## 2018-12-04 RX ADMIN — Medication 4: at 12:08

## 2018-12-04 RX ADMIN — Medication 650 MILLIGRAM(S): at 06:09

## 2018-12-04 RX ADMIN — Medication 325 MILLIGRAM(S): at 13:02

## 2018-12-04 RX ADMIN — Medication 7 UNIT(S): at 17:25

## 2018-12-04 RX ADMIN — Medication 40 MILLIGRAM(S): at 06:09

## 2018-12-04 RX ADMIN — CARVEDILOL PHOSPHATE 12.5 MILLIGRAM(S): 80 CAPSULE, EXTENDED RELEASE ORAL at 17:14

## 2018-12-04 RX ADMIN — Medication 600 MILLIGRAM(S): at 06:09

## 2018-12-04 RX ADMIN — CARVEDILOL PHOSPHATE 12.5 MILLIGRAM(S): 80 CAPSULE, EXTENDED RELEASE ORAL at 06:08

## 2018-12-04 RX ADMIN — ISOSORBIDE MONONITRATE 30 MILLIGRAM(S): 60 TABLET, EXTENDED RELEASE ORAL at 12:06

## 2018-12-04 RX ADMIN — HEPARIN SODIUM 5000 UNIT(S): 5000 INJECTION INTRAVENOUS; SUBCUTANEOUS at 06:09

## 2018-12-04 RX ADMIN — PANTOPRAZOLE SODIUM 40 MILLIGRAM(S): 20 TABLET, DELAYED RELEASE ORAL at 06:09

## 2018-12-04 RX ADMIN — Medication 7 UNIT(S): at 12:07

## 2018-12-04 RX ADMIN — Medication 325 MILLIGRAM(S): at 06:08

## 2018-12-04 RX ADMIN — Medication 650 MILLIGRAM(S): at 13:02

## 2018-12-04 RX ADMIN — Medication 50 MICROGRAM(S): at 06:09

## 2018-12-04 RX ADMIN — Medication 600 MILLIGRAM(S): at 17:14

## 2018-12-04 RX ADMIN — Medication 81 MILLIGRAM(S): at 12:06

## 2018-12-04 RX ADMIN — Medication 7 UNIT(S): at 07:53

## 2018-12-04 RX ADMIN — BUDESONIDE AND FORMOTEROL FUMARATE DIHYDRATE 2 PUFF(S): 160; 4.5 AEROSOL RESPIRATORY (INHALATION) at 07:54

## 2018-12-04 RX ADMIN — SEVELAMER CARBONATE 1600 MILLIGRAM(S): 2400 POWDER, FOR SUSPENSION ORAL at 13:02

## 2018-12-04 RX ADMIN — Medication 40 MILLIGRAM(S): at 17:14

## 2018-12-04 RX ADMIN — Medication 2: at 17:25

## 2018-12-04 RX ADMIN — Medication 650 MILLIGRAM(S): at 17:59

## 2018-12-04 RX ADMIN — Medication 650 MILLIGRAM(S): at 17:14

## 2018-12-04 NOTE — DISCHARGE NOTE ADULT - CARE PLAN
Principal Discharge DX:	COPD (chronic obstructive pulmonary disease)  Goal:	medically optimize condition  Assessment and plan of treatment:	take medications as prescribed and follow up with PMD in a week  Secondary Diagnosis:	Chronic kidney disease  Goal:	medically optimize condition  Assessment and plan of treatment:	take medications as prescribed and follow up with PMD in a week to see the need for Teso placement and the need for HD Principal Discharge DX:	COPD (chronic obstructive pulmonary disease)  Goal:	medically optimize condition  Assessment and plan of treatment:	take medications as prescribed and follow up with PMD in a week  Secondary Diagnosis:	Chronic kidney disease  Goal:	medically optimize condition  Assessment and plan of treatment:	take medications as prescribed.  follow up with Lida Carrero  in a week.

## 2018-12-04 NOTE — DISCHARGE NOTE ADULT - MEDICATION SUMMARY - MEDICATIONS TO STOP TAKING
I will STOP taking the medications listed below when I get home from the hospital:    enalapril 10 mg oral tablet

## 2018-12-04 NOTE — PROGRESS NOTE ADULT - SUBJECTIVE AND OBJECTIVE BOX
ANA HINTON  77y Female    CHIEF COMPLAINT:    Patient is a 77y old  Female who presents with a chief complaint of difficulty breathing (04 Dec 2018 08:29)      INTERVAL HPI/OVERNIGHT EVENTS:    Patient seen and examined. Feels better. No sob. No cough. No fever.    ROS: All other systems are negative.    Vital Signs:    T(F): 97.4 (12-04-18 @ 06:26), Max: 98 (12-03-18 @ 14:31)  HR: 68 (12-04-18 @ 06:26) (60 - 69)  BP: 160/74 (12-04-18 @ 06:26) (154/79 - 185/-)  RR: 20 (12-04-18 @ 06:26) (20 - 20)  SpO2: --  I&O's Summary    03 Dec 2018 07:01  -  04 Dec 2018 07:00  --------------------------------------------------------  IN: 820 mL / OUT: 980 mL / NET: -160 mL      Daily     Daily   CAPILLARY BLOOD GLUCOSE  180 (03 Dec 2018 11:14)      POCT Blood Glucose.: 128 mg/dL (04 Dec 2018 07:46)  POCT Blood Glucose.: 255 mg/dL (03 Dec 2018 22:01)  POCT Blood Glucose.: 189 mg/dL (03 Dec 2018 17:06)  POCT Blood Glucose.: 240 mg/dL (03 Dec 2018 12:12)      PHYSICAL EXAM:    GENERAL:  NAD  SKIN: No rashes or lesions  HENT: Atrumatic. Normocephalic. PERRL. Moist membranes.  NECK: Supple, No JVD. No lymphadenopathy.  PULMONARY: BS are decreased B/L. No wheezing. No rales  CVS: Normal S1, S2. Rate and Rythm are regular. No murmurs.  ABDOMEN/GI: Soft, Nontender, Nondistended; BS present  EXTREMITIES: Peripheral pulses intact. No edema B/L LE.  NEUROLOGIC:  No motor or sensory deficit.  PSYCH: Alert & oriented x 3    Consultant(s) Notes Reviewed:  [x ] YES  [ ] NO  Care Discussed with Consultants/Other Providers [ x] YES  [ ] NO        LABS:                        8.1    13.99 )-----------( 127      ( 04 Dec 2018 07:14 )             25.3     12-04    144  |  103  |  132<HH>  ----------------------------<  119<H>  5.1<H>   |  26  |  4.9<HH>    Ca    9.3      04 Dec 2018 07:14  Mg     2.5     12-04      PT/INR - ( 04 Dec 2018 07:14 )   PT: 13.40 sec;   INR: 1.17 ratio         PTT - ( 04 Dec 2018 07:14 )  PTT:36.1 sec    Trop 0.01, CKMB 5.2, CK 23, 12-03-18 @ 01:24        RADIOLOGY & ADDITIONAL TESTS:      Imaging or report Personally Reviewed:  [ ] YES  [ ] NO    Medications:  Standing  ALBUTerol/ipratropium for Nebulization 3 milliLiter(s) Nebulizer every 4 hours  amLODIPine   Tablet 10 milliGRAM(s) Oral daily  aspirin enteric coated 81 milliGRAM(s) Oral daily  buDESOnide 160 MICROgram(s)/formoterol 4.5 MICROgram(s) Inhaler 2 Puff(s) Inhalation two times a day  carvedilol 12.5 milliGRAM(s) Oral every 12 hours  docusate sodium 100 milliGRAM(s) Oral two times a day  epoetin ileana Injectable 5000 Unit(s) SubCutaneous every 7 days  ferrous    sulfate 325 milliGRAM(s) Oral every 8 hours  furosemide    Tablet 40 milliGRAM(s) Oral every 12 hours  guaiFENesin  milliGRAM(s) Oral every 12 hours  heparin  Injectable 5000 Unit(s) SubCutaneous every 8 hours  influenza   Vaccine 0.5 milliLiter(s) IntraMuscular once  insulin glargine Injectable (LANTUS) 32 Unit(s) SubCutaneous at bedtime  insulin lispro (HumaLOG) corrective regimen sliding scale   SubCutaneous three times a day before meals  insulin lispro Injectable (HumaLOG) 7 Unit(s) SubCutaneous three times a day before meals  isosorbide   mononitrate ER Tablet (IMDUR) 30 milliGRAM(s) Oral daily  levoFLOXacin  Tablet 500 milliGRAM(s) Oral every 48 hours  levothyroxine 50 MICROGram(s) Oral daily  pantoprazole    Tablet 40 milliGRAM(s) Oral before breakfast  polyethylene glycol 3350 17 Gram(s) Oral two times a day  predniSONE   Tablet 40 milliGRAM(s) Oral daily  senna 2 Tablet(s) Oral at bedtime  sertraline 50 milliGRAM(s) Oral daily  sevelamer hydrochloride 1600 milliGRAM(s) Oral every 8 hours  sodium bicarbonate 650 milliGRAM(s) Oral three times a day    PRN Meds  acetaminophen   Tablet .. 650 milliGRAM(s) Oral once PRN  acetaminophen   Tablet .. 650 milliGRAM(s) Oral every 6 hours PRN  ALBUTerol/ipratropium for Nebulization 3 milliLiter(s) Nebulizer every 6 hours PRN  dextrose 40% Gel 15 Gram(s) Oral once PRN  glucagon  Injectable 1 milliGRAM(s) IntraMuscular once PRN  ondansetron Injectable 4 milliGRAM(s) IV Push every 6 hours PRN      Case discussed with resident    Care discussed with pt/family

## 2018-12-04 NOTE — CHART NOTE - NSCHARTNOTEFT_GEN_A_CORE
<<<RESIDENT DISCHARGE NOTE>>>     ANA HINTON  [id number]    VITAL SIGNS:  T(F): 97.4 (12-04-18 @ 12:43), Max: 97.5 (12-03-18 @ 22:20)  HR: 60 (12-04-18 @ 12:43)  BP: 154/69 (12-04-18 @ 12:43)  SpO2: --      PHYSICAL EXAMINATION:  GENERAL:  NAD  SKIN: No rashes or lesions  HENT: Atrumatic. Normocephalic. PERRL. Moist membranes.  NECK: Supple, No JVD. No lymphadenopathy.  PULMONARY: BS are decreased B/L. No wheezing. No rales  CVS: Normal S1, S2. Rate and Rythm are regular. No murmurs.  ABDOMEN/GI: Soft, Nontender, Nondistended; BS present  EXTREMITIES: Peripheral pulses intact. No edema B/L LE.  NEUROLOGIC:  No motor or sensory deficit.  PSYCH: Alert & oriented x 3    TEST RESULTS:                        8.1    13.99 )-----------( 127      ( 04 Dec 2018 07:14 )             25.3       12-04    144  |  103  |  132<HH>  ----------------------------<  119<H>  5.1<H>   |  26  |  4.9<HH>    Ca    9.3      04 Dec 2018 07:14  Mg     2.5     12-04        FINAL DISCHARGE INTERVIEW:  Resident(s) Present: (Name: Dr. Jana Kauffman), RN Present: (Name:  Rosa)    DISCHARGE MEDICATION RECONCILIATION  reviewed with Attending (Name: Dr. Foreman)    DISPOSITION:   Home <<<RESIDENT DISCHARGE NOTE>>>     ANA HINTON  [id number]    VITAL SIGNS:  T(F): 97.4 (12-04-18 @ 12:43), Max: 97.5 (12-03-18 @ 22:20)  HR: 60 (12-04-18 @ 12:43)  BP: 154/69 (12-04-18 @ 12:43)  SpO2: --      PHYSICAL EXAMINATION:  GENERAL:  NAD  SKIN: No rashes or lesions  HENT: Atrumatic. Normocephalic. PERRL. Moist membranes.  NECK: Supple, No JVD. No lymphadenopathy.  PULMONARY: BS are decreased B/L. No wheezing. No rales  CVS: Normal S1, S2. Rate and Rythm are regular. No murmurs.  ABDOMEN/GI: Soft, Nontender, Nondistended; BS present  EXTREMITIES: Peripheral pulses intact. No edema B/L LE.  NEUROLOGIC:  No motor or sensory deficit.  PSYCH: Alert & oriented x 3    TEST RESULTS:                        8.1    13.99 )-----------( 127      ( 04 Dec 2018 07:14 )             25.3       12-04    144  |  103  |  132<HH>  ----------------------------<  119<H>  5.1<H>   |  26  |  4.9<HH>    Ca    9.3      04 Dec 2018 07:14  Mg     2.5     12-04        FINAL DISCHARGE INTERVIEW:  Resident(s) Present: (Name: Dr. Jana Kauffman), RN Present: (Name:  Brenda)    DISCHARGE MEDICATION RECONCILIATION  reviewed with Attending (Name: Dr. Foreman)    DISPOSITION:   Home

## 2018-12-04 NOTE — PROGRESS NOTE ADULT - SUBJECTIVE AND OBJECTIVE BOX
OVERNIGHT EVENTS: feels better, occ cough    Vital Signs Last 24 Hrs  T(C): 36.3 (04 Dec 2018 06:26), Max: 36.7 (03 Dec 2018 14:31)  T(F): 97.4 (04 Dec 2018 06:26), Max: 98 (03 Dec 2018 14:31)  HR: 68 (04 Dec 2018 06:26) (60 - 69)  BP: 160/74 (04 Dec 2018 06:26) (154/79 - 185/-)  RR: 20 (04 Dec 2018 06:26) (20 - 20)  SpO2: 94%    PHYSICAL EXAMINATION:    GENERAL: The patient is awake and alert in no apparent distress.     HEENT: Head is normocephalic and atraumatic. Extraocular muscles are intact. Mucous membranes are moist.    NECK: Supple.    LUNGS: dec bs diffusely    HEART: Regular rate and rhythm without murmur.    ABDOMEN: Soft, nontender, and nondistended.      EXTREMITIES: Without any cyanosis, clubbing, rash, lesions or edema.    NEUROLOGIC: Grossly intact.    SKIN: No ulceration or induration present.      LABS:                        8.1    13.99 )-----------( 127      ( 04 Dec 2018 07:14 )             25.3     12-03    141  |  102  |  130<HH>  ----------------------------<  164<H>  5.1<H>   |  26  |  5.2<HH>    Ca    9.1      03 Dec 2018 07:40  Mg     2.5     12-04      PT/INR - ( 04 Dec 2018 07:14 )   PT: 13.40 sec;   INR: 1.17 ratio         PTT - ( 04 Dec 2018 07:14 )  PTT:36.1 sec      CARDIAC MARKERS ( 03 Dec 2018 01:24 )  x     / 0.01 ng/mL / 23 U/L / x     / 5.2 ng/mL                  12-03-18 @ 07:01  -  12-04-18 @ 07:00  --------------------------------------------------------  IN: 820 mL / OUT: 980 mL / NET: -160 mL        MICROBIOLOGY:      MEDICATIONS  (STANDING):  ALBUTerol/ipratropium for Nebulization 3 milliLiter(s) Nebulizer every 4 hours  amLODIPine   Tablet 10 milliGRAM(s) Oral daily  aspirin enteric coated 81 milliGRAM(s) Oral daily  buDESOnide 160 MICROgram(s)/formoterol 4.5 MICROgram(s) Inhaler 2 Puff(s) Inhalation two times a day  carvedilol 12.5 milliGRAM(s) Oral every 12 hours  dextrose 5%. 1000 milliLiter(s) (50 mL/Hr) IV Continuous <Continuous>  dextrose 50% Injectable 12.5 Gram(s) IV Push once  dextrose 50% Injectable 25 Gram(s) IV Push once  dextrose 50% Injectable 25 Gram(s) IV Push once  docusate sodium 100 milliGRAM(s) Oral two times a day  epoetin ileana Injectable 5000 Unit(s) SubCutaneous every 7 days  ferrous    sulfate 325 milliGRAM(s) Oral every 8 hours  furosemide    Tablet 40 milliGRAM(s) Oral every 12 hours  guaiFENesin  milliGRAM(s) Oral every 12 hours  heparin  Injectable 5000 Unit(s) SubCutaneous every 8 hours  influenza   Vaccine 0.5 milliLiter(s) IntraMuscular once  insulin glargine Injectable (LANTUS) 32 Unit(s) SubCutaneous at bedtime  insulin lispro (HumaLOG) corrective regimen sliding scale   SubCutaneous three times a day before meals  insulin lispro Injectable (HumaLOG) 7 Unit(s) SubCutaneous three times a day before meals  isosorbide   mononitrate ER Tablet (IMDUR) 30 milliGRAM(s) Oral daily  levoFLOXacin  Tablet 500 milliGRAM(s) Oral every 48 hours  levothyroxine 50 MICROGram(s) Oral daily  pantoprazole    Tablet 40 milliGRAM(s) Oral before breakfast  polyethylene glycol 3350 17 Gram(s) Oral two times a day  predniSONE   Tablet 40 milliGRAM(s) Oral daily  senna 2 Tablet(s) Oral at bedtime  sertraline 50 milliGRAM(s) Oral daily  sevelamer hydrochloride 1600 milliGRAM(s) Oral every 8 hours  sodium bicarbonate 650 milliGRAM(s) Oral three times a day    MEDICATIONS  (PRN):  acetaminophen   Tablet .. 650 milliGRAM(s) Oral once PRN Moderate Pain (4 - 6)  acetaminophen   Tablet .. 650 milliGRAM(s) Oral every 6 hours PRN Moderate Pain (4 - 6)  ALBUTerol/ipratropium for Nebulization 3 milliLiter(s) Nebulizer every 6 hours PRN Shortness of Breath and/or Wheezing  dextrose 40% Gel 15 Gram(s) Oral once PRN Blood Glucose LESS THAN 70 milliGRAM(s)/deciliter  glucagon  Injectable 1 milliGRAM(s) IntraMuscular once PRN Glucose LESS THAN 70 milligrams/deciliter  ondansetron Injectable 4 milliGRAM(s) IV Push every 6 hours PRN Nausea and/or Vomiting      RADIOLOGY & ADDITIONAL STUDIES:

## 2018-12-04 NOTE — PROGRESS NOTE ADULT - SUBJECTIVE AND OBJECTIVE BOX
Hospital Day: 26  Patient is a 77y old  Female who presents with a chief complaint of difficulty breathing, consulted for tesio and angel (03 Dec 2018 17:19)    PAST MEDICAL & SURGICAL HISTORY:  Dyslipidemia  Coronary artery disease  Diabetes mellitus  COPD (chronic obstructive pulmonary disease)  Dyslipidemia  Chronic kidney disease  Hypertension  H/O hernia repair  S/P CABG (coronary artery bypass graft)      Events of the Last 24h:none  Vital Signs Last 24 Hrs  T(C): 36.4 (03 Dec 2018 22:20), Max: 36.7 (03 Dec 2018 14:31)  T(F): 97.5 (03 Dec 2018 22:20), Max: 98 (03 Dec 2018 14:31)  HR: 63 (03 Dec 2018 22:20) (60 - 73)  BP: 154/79 (03 Dec 2018 22:20) (154/79 - 185/-)  BP(mean): --  RR: 20 (03 Dec 2018 22:20) (20 - 20)  SpO2: --        Diet, DASH/TLC:   Sodium & Cholesterol Restricted (18 @ 21:23)      I&O's Summary    02 Dec 2018 07:01  -  03 Dec 2018 07:00  --------------------------------------------------------  IN: 1440 mL / OUT: 1040 mL / NET: 400 mL    03 Dec 2018 07:01  -  04 Dec 2018 02:36  --------------------------------------------------------  IN: 820 mL / OUT: 980 mL / NET: -160 mL     I&O's Detail    02 Dec 2018 07:  -  03 Dec 2018 07:00  --------------------------------------------------------  IN:    Oral Fluid: 1440 mL  Total IN: 1440 mL    OUT:    Voided: 1040 mL  Total OUT: 1040 mL    Total NET: 400 mL      03 Dec 2018 07:01  -  04 Dec 2018 02:36  --------------------------------------------------------  IN:    Oral Fluid: 820 mL  Total IN: 820 mL    OUT:    Voided: 980 mL  Total OUT: 980 mL    Total NET: -160 mL          MEDICATIONS  (STANDING):  ALBUTerol/ipratropium for Nebulization 3 milliLiter(s) Nebulizer every 4 hours  amLODIPine   Tablet 5 milliGRAM(s) Oral daily  aspirin enteric coated 81 milliGRAM(s) Oral daily  buDESOnide 160 MICROgram(s)/formoterol 4.5 MICROgram(s) Inhaler 2 Puff(s) Inhalation two times a day  carvedilol 12.5 milliGRAM(s) Oral every 12 hours  dextrose 5%. 1000 milliLiter(s) (50 mL/Hr) IV Continuous <Continuous>  dextrose 50% Injectable 12.5 Gram(s) IV Push once  dextrose 50% Injectable 25 Gram(s) IV Push once  dextrose 50% Injectable 25 Gram(s) IV Push once  docusate sodium 100 milliGRAM(s) Oral two times a day  epoetin ileana Injectable 5000 Unit(s) SubCutaneous every 7 days  ferrous    sulfate 325 milliGRAM(s) Oral every 8 hours  furosemide    Tablet 40 milliGRAM(s) Oral every 12 hours  guaiFENesin  milliGRAM(s) Oral every 12 hours  heparin  Injectable 5000 Unit(s) SubCutaneous every 8 hours  influenza   Vaccine 0.5 milliLiter(s) IntraMuscular once  insulin glargine Injectable (LANTUS) 32 Unit(s) SubCutaneous at bedtime  insulin lispro (HumaLOG) corrective regimen sliding scale   SubCutaneous three times a day before meals  insulin lispro Injectable (HumaLOG) 7 Unit(s) SubCutaneous three times a day before meals  isosorbide   mononitrate ER Tablet (IMDUR) 30 milliGRAM(s) Oral daily  levoFLOXacin  Tablet 500 milliGRAM(s) Oral every 48 hours  levothyroxine 50 MICROGram(s) Oral daily  pantoprazole    Tablet 40 milliGRAM(s) Oral before breakfast  polyethylene glycol 3350 17 Gram(s) Oral two times a day  predniSONE   Tablet 40 milliGRAM(s) Oral daily  senna 2 Tablet(s) Oral at bedtime  sertraline 50 milliGRAM(s) Oral daily  sevelamer hydrochloride 800 milliGRAM(s) Oral every 8 hours  sodium bicarbonate 650 milliGRAM(s) Oral three times a day    MEDICATIONS  (PRN):  acetaminophen   Tablet .. 650 milliGRAM(s) Oral once PRN Moderate Pain (4 - 6)  acetaminophen   Tablet .. 650 milliGRAM(s) Oral every 6 hours PRN Moderate Pain (4 - 6)  ALBUTerol/ipratropium for Nebulization 3 milliLiter(s) Nebulizer every 6 hours PRN Shortness of Breath and/or Wheezing  dextrose 40% Gel 15 Gram(s) Oral once PRN Blood Glucose LESS THAN 70 milliGRAM(s)/deciliter  glucagon  Injectable 1 milliGRAM(s) IntraMuscular once PRN Glucose LESS THAN 70 milligrams/deciliter  ondansetron Injectable 4 milliGRAM(s) IV Push every 6 hours PRN Nausea and/or Vomiting      PHYSICAL EXAM:    GENERAL: NAD    HEENT: NCAT    CHEST/LUNGS: CTAB    HEART: RRR,  No murmurs, rubs, or gallops    ABDOMEN: SNTND +BS    EXTREMITIES:  FROM, No clubbing, cyanosis, or edema, palpable pulse    NEURO: No focal neurological deficits    SKIN: No rashes or lesions    INCISION/WOUNDS:                          8.5    18.60 )-----------( 140      ( 03 Dec 2018 07:40 )             26.9        CBC Full  -  ( 03 Dec 2018 07:40 )  WBC Count : 18.60 K/uL  Hemoglobin : 8.5 g/dL  Hematocrit : 26.9 %  Platelet Count - Automated : 140 K/uL  Mean Cell Volume : 83.3 fL  Mean Cell Hemoglobin : 26.3 pg  Mean Cell Hemoglobin Concentration : 31.6 g/dL  Auto Neutrophil # : x  Auto Lymphocyte # : x  Auto Monocyte # : x  Auto Eosinophil # : x  Auto Basophil # : x  Auto Neutrophil % : x  Auto Lymphocyte % : x  Auto Monocyte % : x  Auto Eosinophil % : x  Auto Basophil % : x               141   |  102   |  130                Ca: 9.1    BMP:   ----------------------------< 164    M.6   (18 @ 07:40)             5.1    |  26    | 5.2                Ph: x        LFT:     TPro: 5.1 / Alb: 2.6 / TBili: <0.2 / DBili: x / AST: 16 / ALT: 25 / AlkPhos: 117   (18 @ 08:03)        CARDIAC MARKERS ( 03 Dec 2018 01:24 )  x     / 0.01 ng/mL / 23 U/L / x     / 5.2 ng/mL            IMAGING:    PATHOLOGY:      SPECTRA:

## 2018-12-04 NOTE — PROGRESS NOTE ADULT - ATTENDING COMMENTS
I was Physically Present for the key portions of the evaluation   I agree with the above History  , Physical examination Assessment and plan   I have Reviewed , Modified or appended where appropriate.  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
Patient seen and examined independently.
above noted abdomen soft neck no mass no need for tessio as per renal
I was Physically Present for the key portions of the evaluation   I agree with the above History  , Physical examination Assessment and plan   I have Reviewed , Modified or appended where appropriate.  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

## 2018-12-04 NOTE — DISCHARGE NOTE ADULT - PLAN OF CARE
medically optimize condition take medications as prescribed and follow up with PMD in a week take medications as prescribed and follow up with PMD in a week to see the need for Teso placement and the need for HD take medications as prescribed.  follow up with Lida Carrero  in a week.

## 2018-12-04 NOTE — DIETITIAN INITIAL EVALUATION ADULT. - ENERGY NEEDS
Energy needs: 7632-1570 kcal/day - 25-27kcal/kg  Protein needs: 53-60g/day - 0.8-0.9g/kg as pt MARTIN on CKD stage 5, not on HD  fluid needs: per LIP <1L/day

## 2018-12-04 NOTE — DISCHARGE NOTE ADULT - MEDICATION SUMMARY - MEDICATIONS TO TAKE
I will START or STAY ON the medications listed below when I get home from the hospital:    predniSONE 20 mg oral tablet  -- 2 tab(s) by mouth once a day  -- Indication: For COPD (chronic obstructive pulmonary disease)    Aspirin Enteric Coated 81 mg oral delayed release tablet  -- 1 tab(s) by mouth once a day  -- Indication: For S/P CABG (coronary artery bypass graft)    sodium bicarbonate 650 mg oral tablet  -- 1 tab(s) by mouth 3 times a day  -- Indication: For Chronic kidney disease    isosorbide mononitrate 30 mg oral tablet, extended release  -- 1 tab(s) by mouth once a day (in the morning)  -- Indication: For S/P CABG (coronary artery bypass graft)    sertraline 50 mg oral tablet  -- 1 tab(s) by mouth once a day  -- Indication: For Depression    SITagliptin 25 mg oral tablet  -- 1 tab(s) by mouth once a day  -- Indication: For DM    glipiZIDE 5 mg oral tablet  -- Indication: For DM    insulin glargine 100 units/mL subcutaneous solution  -- Indication: For DM    carvedilol 12.5 mg oral tablet  -- 1 tab(s) by mouth 2 times a day  -- Indication: For HTN    budesonide-formoterol 160 mcg-4.5 mcg/inh inhalation aerosol  -- 2 puff(s) inhaled 2 times a day  -- Indication: For COPD (chronic obstructive pulmonary disease)    tiotropium 18 mcg inhalation capsule  -- 1 cap(s) inhaled once a day  -- Indication: For COPD (chronic obstructive pulmonary disease)    amLODIPine 10 mg oral tablet  -- 1 tab(s) by mouth once a day  -- Indication: For HTN    furosemide 20 mg oral tablet  -- 1 tab(s) by mouth once a day  -- Indication: For HTN    FeroSul 325 mg (65 mg elemental iron) oral tablet  -- 1 tab(s) by mouth 3 times a day  -- Indication: For CKD    docusate sodium 100 mg oral capsule  -- 1 cap(s) by mouth 2 times a day  -- Indication: For CKD    sevelamer hydrochloride 800 mg oral tablet  -- 2 tab(s) by mouth every 8 hours  -- Indication: For CKD    levothyroxine 50 mcg (0.05 mg) oral tablet  -- 1 tab(s) by mouth once a day  -- Indication: For Hypothyroid    calcitriol 0.25 mcg oral capsule  -- 1 cap(s) by mouth once a day  -- Indication: For CKD

## 2018-12-04 NOTE — DIETITIAN INITIAL EVALUATION ADULT. - PERTINENT MEDS FT
heparin, norvasc, renagel, prednisone, lasix, lantus, procrit, HeSO4, albuterol, colace, humalog, miralax, senna, symbicort, sodium bicarb, zofran, coreg, imdur, synthroid, protonix

## 2018-12-04 NOTE — DIETITIAN INITIAL EVALUATION ADULT. - MD RECOMMEND
recs: 1. consider carb consistent renal diet (w/ evening snack) w/ 60g Protein restriction. 2. If HD initiated, D/C protein restriction 3. per LIP fluid restriction <1L

## 2018-12-04 NOTE — PROGRESS NOTE ADULT - PROVIDER SPECIALTY LIST ADULT
Hospitalist
Internal Medicine
Nephrology
Pulmonology
Surgery
Internal Medicine
Internal Medicine
Nephrology
Hospitalist
Pulmonology

## 2018-12-04 NOTE — PROGRESS NOTE ADULT - SUBJECTIVE AND OBJECTIVE BOX
seen and examined  no distress  on o2      Standing Inpatient Medications  ALBUTerol/ipratropium for Nebulization 3 milliLiter(s) Nebulizer every 4 hours  amLODIPine   Tablet 5 milliGRAM(s) Oral daily  aspirin enteric coated 81 milliGRAM(s) Oral daily  buDESOnide 160 MICROgram(s)/formoterol 4.5 MICROgram(s) Inhaler 2 Puff(s) Inhalation two times a day  carvedilol 12.5 milliGRAM(s) Oral every 12 hours  dextrose 5%. 1000 milliLiter(s) IV Continuous <Continuous>  dextrose 50% Injectable 12.5 Gram(s) IV Push once  dextrose 50% Injectable 25 Gram(s) IV Push once  dextrose 50% Injectable 25 Gram(s) IV Push once  docusate sodium 100 milliGRAM(s) Oral two times a day  epoetin ileana Injectable 5000 Unit(s) SubCutaneous every 7 days  ferrous    sulfate 325 milliGRAM(s) Oral every 8 hours  furosemide    Tablet 40 milliGRAM(s) Oral every 12 hours  guaiFENesin  milliGRAM(s) Oral every 12 hours  heparin  Injectable 5000 Unit(s) SubCutaneous every 8 hours  influenza   Vaccine 0.5 milliLiter(s) IntraMuscular once  insulin glargine Injectable (LANTUS) 32 Unit(s) SubCutaneous at bedtime  insulin lispro (HumaLOG) corrective regimen sliding scale   SubCutaneous three times a day before meals  insulin lispro Injectable (HumaLOG) 7 Unit(s) SubCutaneous three times a day before meals  isosorbide   mononitrate ER Tablet (IMDUR) 30 milliGRAM(s) Oral daily  levoFLOXacin  Tablet 500 milliGRAM(s) Oral every 48 hours  levothyroxine 50 MICROGram(s) Oral daily  pantoprazole    Tablet 40 milliGRAM(s) Oral before breakfast  polyethylene glycol 3350 17 Gram(s) Oral two times a day  predniSONE   Tablet 40 milliGRAM(s) Oral daily  senna 2 Tablet(s) Oral at bedtime  sertraline 50 milliGRAM(s) Oral daily  sevelamer hydrochloride 800 milliGRAM(s) Oral every 8 hours  sodium bicarbonate 650 milliGRAM(s) Oral three times a day        VITALS/PHYSICAL EXAM  --------------------------------------------------------------------------------  T(C): 36.3 (12-04-18 @ 06:26), Max: 36.7 (12-03-18 @ 14:31)  HR: 68 (12-04-18 @ 06:26) (60 - 69)  BP: 160/74 (12-04-18 @ 06:26) (154/79 - 185/-)  RR: 20 (12-04-18 @ 06:26) (20 - 20)  SpO2: --  Wt(kg): --        12-03-18 @ 07:01  -  12-04-18 @ 07:00  --------------------------------------------------------  IN: 820 mL / OUT: 980 mL / NET: -160 mL      Physical Exam:  	Gen: NAD, on o2  	Pulm:  B/L ronchi at bases   	CV:  S1S2; no rub  	Abd: +distended  	LE: no edema      LABS/STUDIES  --------------------------------------------------------------------------------              8.5    18.60 >-----------<  140      [12-03-18 @ 07:40]              26.9     141  |  102  |  130  ----------------------------<  164      [12-03-18 @ 07:40]  5.1   |  26  |  5.2        Ca     9.1     [12-03-18 @ 07:40]      Mg     1.6     [12-03-18 @ 07:40]          Troponin 0.01      [12-03-18 @ 01:24]  CK 23      [12-03-18 @ 01:24]    Creatinine Trend:  SCr 5.2 [12-03 @ 07:40]  SCr 5.4 [12-02 @ 09:01]  SCr 5.0 [12-01 @ 07:00]  SCr 5.3 [11-30 @ 09:01]  SCr 5.5 [11-29 @ 08:03]        Iron 61, TIBC 217, %sat 28      [11-28-18 @ 07:33]  Ferritin 198      [11-28-18 @ 07:33]  PTH -- (Ca 9.0)      [11-28-18 @ 07:33]   114  HbA1c 5.8      [11-26-18 @ 09:30]

## 2018-12-04 NOTE — PROGRESS NOTE ADULT - ASSESSMENT
76 yo female with PMHx COPD on home oxygen, HTN, HLD, DM, history of bypass, CKD not on dialysis presents for shortness of breath, congestion, productive cough x 4 days.    1.	COPD exacerbation/Ac. Hypercapnic and Hypoxic resp failure  2.	MARTIN/CKD-V  3.	DM-2  4.	HTN/DL  5.	Hyperkalemia  6.	Anemia of Ch. disease.         PLAN:    ·	Pulm F/U noted  ·	Cont Prednisone 40 mg po q 24h for 5 days  ·	cont Symbicort twice a day  ·	D/C Levaquin  ·	Cont NaHCO3  ·	Care D/W the renal. Called pt's daughter and care D/W her. Would prefer to start HD with her own doctors in [facility].  ·	Plan of care D/W the pt and her daughter on telephone  ·	D/C planning    * Med rec reviewed with the intern. Plan of care D/W the pt. Time spent40 minuts.

## 2018-12-04 NOTE — DIETITIAN INITIAL EVALUATION ADULT. - OTHER INFO
Pt presents w/ difficulty breathing, dx PNA. SOB continues but is improving. Pt w/ acute on chronic hypoxic respiratory failure 2/2 COPD exacerbation + fluid overload 2/2 CKD- pulm following, on lasix, on bipap at night and PRN. MARTIN on CKD stage V, may need HD, fluid restriction <1L, daily BMP to avoid diuresis. Normocytic anemia- etiology includes anemia of chronic disease, likely CKD; no iron deficiency. HTN; DM2; CAD s/p CABG; hypothyroidism- c/w meds.

## 2018-12-04 NOTE — PROGRESS NOTE ADULT - ASSESSMENT
respiratory failure/copd/? MARTIN/stage 5 ckd :  # creatinine stable  # non oliguric  # sono : no hydro  # continue  lasix  40 q 12  # check bladder scan for PVR  # increase  renagel to  2 tablets q 8  # if BP remain elevated, increase  norvasc  to 10   # continue sodium bicarbonate  # h/h noted, on feso4 po q 8 ,and procrit 5000 units s/c weekly   # before placing tesio, please call palliative care evaluation   # no indication for RRT, however if no improvement might need HD this admission   # will follow

## 2018-12-04 NOTE — DIETITIAN INITIAL EVALUATION ADULT. - SOURCE
patient/Pt reports good appetite and PO- pt usually eats 5-6 small meals throughout the day, per PCA pt w/ ~100% PO consistently. PTA pt supplements MVI+minerals. NKFA. Pt reports # however pt w/ fluid overload 2/2 CKD, current wt 158#.  Pt last BM 12/4- previous constipation resolved, pt sometimes w/ nausea after meds. No chewing/swallowing difficulty. Pt w/ pressure injury suspected DTI sacral spine. Pt denies d/c education and further diet education, however, will provide pt w/ renal nutrition therapy as pt possibly to receive HD and currently w/ elevated K lab values. current BMI: 26. IBW: 125#. alert/awake/oriented to person, place, time/situation

## 2018-12-04 NOTE — DISCHARGE NOTE ADULT - HOSPITAL COURSE
78 yo female with PMHx COPD on home oxygen, HTN, HLD, DM, history of bypass, CKD not on dialysis presents for shortness of breath, congestion, productive cough x 4 days.  In ED, pt was found to be saturating at high 70's on 4L of NC and she was placed on bi pap - saturating at 100%.   pt was admitted for COPD exacerbation. pulm was consulted. copd stabilized. pt developed MARTIN on CKD with cr around 5 since admission with baseline around 2.5 per PMD.   nephrology consulted. no need for HD at this time but pt may need HD soon. Final Decision made to discharge pt as she is clinically improved and clinically stable for discharge to follow up with PMD and nephro as out pt to see the need for HD.

## 2018-12-04 NOTE — PROGRESS NOTE ADULT - ASSESSMENT
IMPRESSION:    Acute hypercapnic respiratory failure/ COPD exacerbation/ fluid overlaod  multiple co morbidities  CP    PLAN:    -  prednisone 40 q 24h for 5 days, 20 for 5 dasy  - cardio eval  - OOB TO chair  - HD KEEP NEG BALANCE  - BIPAP ar nite and as needed  - neb q 6 h  - DVT prophylaxis  - poor prognosis  - DNR/ DNI  dc planning

## 2018-12-04 NOTE — DISCHARGE NOTE ADULT - PATIENT PORTAL LINK FT
You can access the [facility] Patient Portal, offered by [facility], by registering with the following website: [email or web address]

## 2018-12-04 NOTE — PROGRESS NOTE ADULT - REASON FOR ADMISSION
difficulty breathing

## 2018-12-12 DIAGNOSIS — E78.5 HYPERLIPIDEMIA, UNSPECIFIED: ICD-10-CM

## 2018-12-12 DIAGNOSIS — E11.51 TYPE 2 DIABETES MELLITUS WITH DIABETIC PERIPHERAL ANGIOPATHY WITHOUT GANGRENE: ICD-10-CM

## 2018-12-12 DIAGNOSIS — I27.20 PULMONARY HYPERTENSION, UNSPECIFIED: ICD-10-CM

## 2018-12-12 DIAGNOSIS — N18.5 CHRONIC KIDNEY DISEASE, STAGE 5: ICD-10-CM

## 2018-12-12 DIAGNOSIS — I12.0 HYPERTENSIVE CHRONIC KIDNEY DISEASE WITH STAGE 5 CHRONIC KIDNEY DISEASE OR END STAGE RENAL DISEASE: ICD-10-CM

## 2018-12-12 DIAGNOSIS — N17.9 ACUTE KIDNEY FAILURE, UNSPECIFIED: ICD-10-CM

## 2018-12-12 DIAGNOSIS — J44.1 CHRONIC OBSTRUCTIVE PULMONARY DISEASE WITH (ACUTE) EXACERBATION: ICD-10-CM

## 2018-12-12 DIAGNOSIS — J18.9 PNEUMONIA, UNSPECIFIED ORGANISM: ICD-10-CM

## 2018-12-12 DIAGNOSIS — Z99.81 DEPENDENCE ON SUPPLEMENTAL OXYGEN: ICD-10-CM

## 2018-12-12 DIAGNOSIS — D63.1 ANEMIA IN CHRONIC KIDNEY DISEASE: ICD-10-CM

## 2018-12-12 DIAGNOSIS — E11.22 TYPE 2 DIABETES MELLITUS WITH DIABETIC CHRONIC KIDNEY DISEASE: ICD-10-CM

## 2018-12-12 DIAGNOSIS — E87.2 ACIDOSIS: ICD-10-CM

## 2018-12-12 DIAGNOSIS — J96.02 ACUTE RESPIRATORY FAILURE WITH HYPERCAPNIA: ICD-10-CM

## 2018-12-12 DIAGNOSIS — I25.5 ISCHEMIC CARDIOMYOPATHY: ICD-10-CM

## 2018-12-12 DIAGNOSIS — Z87.891 PERSONAL HISTORY OF NICOTINE DEPENDENCE: ICD-10-CM

## 2018-12-12 DIAGNOSIS — Z95.1 PRESENCE OF AORTOCORONARY BYPASS GRAFT: ICD-10-CM

## 2018-12-12 DIAGNOSIS — E86.0 DEHYDRATION: ICD-10-CM

## 2022-11-04 NOTE — PROGRESS NOTE ADULT - SUBJECTIVE AND OBJECTIVE BOX
ANA HINTON  77y Female    CHIEF COMPLAINT:    Patient is a 77y old  Female who presents with a chief complaint of difficulty breathing (29 Nov 2018 18:43)      INTERVAL HPI/OVERNIGHT EVENTS:    Patient seen and examined. Still having sob and wheezing. Also C/O cough. Very little improvement. No fever.    ROS: All other systems are negative.    Vital Signs:    T(F): 97.3 (11-30-18 @ 04:33), Max: 97.3 (11-30-18 @ 04:33)  HR: 71 (11-30-18 @ 04:33) (69 - 78)  BP: 157/73 (11-30-18 @ 04:33) (156/69 - 174/82)  RR: 20 (11-30-18 @ 04:33) (18 - 20)  SpO2: --  I&O's Summary    29 Nov 2018 07:01  -  30 Nov 2018 07:00  --------------------------------------------------------  IN: 540 mL / OUT: 250 mL / NET: 290 mL      Daily     Daily   CAPILLARY BLOOD GLUCOSE      POCT Blood Glucose.: 306 mg/dL (30 Nov 2018 11:13)  POCT Blood Glucose.: 226 mg/dL (30 Nov 2018 07:44)  POCT Blood Glucose.: 198 mg/dL (29 Nov 2018 21:01)  POCT Blood Glucose.: 281 mg/dL (29 Nov 2018 16:29)      PHYSICAL EXAM:    GENERAL:  NAD  SKIN: No rashes or lesions  HENT: Atrumatic. Normocephalic. PERRL. Moist membranes.  NECK: Supple, No JVD. No lymphadenopathy.  PULMONARY: B/L wheezing  CVS: Normal S1, S2. Rate and Rythm are regular. No murmurs.  ABDOMEN/GI: Soft, Nontender, Nondistended; BS present  EXTREMITIES: Peripheral pulses intact. No edema B/L LE.  NEUROLOGIC:  No motor or sensory deficit.  PSYCH: Alert & oriented x 3    Consultant(s) Notes Reviewed:  [x ] YES  [ ] NO  Care Discussed with Consultants/Other Providers [ x] YES  [ ] NO      LABS:                        8.7    11.67 )-----------( 137      ( 30 Nov 2018 09:01 )             28.3     11-30    142  |  103  |  112<HH>  ----------------------------<  250<H>   Creatinine Trend: 5.3<--, 5.5<--, 5.4<--, 5.4<--, 5.3<--, 4.9<--  5.1<H>   |  22  |  5.3<HH>    Ca    9.3      30 Nov 2018 09:01  Mg     1.8     11-30    TPro  5.1<L>  /  Alb  2.6<L>  /  TBili  <0.2  /  DBili  x   /  AST  16  /  ALT  25  /  AlkPhos  117<H>  11-29      Serum Pro-Brain Natriuretic Peptide: 52010 pg/mL (11-26-18 @ 01:00)    Trop <0.01, CKMB 2.9, CK 18, 11-27-18 @ 12:49        RADIOLOGY & ADDITIONAL TESTS:      Imaging or report Personally Reviewed:  [ ] YES  [ ] NO    Medications:  Standing  ALBUTerol/ipratropium for Nebulization 3 milliLiter(s) Nebulizer every 4 hours  aspirin enteric coated 81 milliGRAM(s) Oral daily  buDESOnide 160 MICROgram(s)/formoterol 4.5 MICROgram(s) Inhaler 2 Puff(s) Inhalation two times a day  carvedilol 12.5 milliGRAM(s) Oral every 12 hours  dextrose 5%. 1000 milliLiter(s) IV Continuous <Continuous>  dextrose 50% Injectable 12.5 Gram(s) IV Push once  dextrose 50% Injectable 25 Gram(s) IV Push once  dextrose 50% Injectable 25 Gram(s) IV Push once  furosemide    Tablet 20 milliGRAM(s) Oral daily  guaiFENesin  milliGRAM(s) Oral every 12 hours  heparin  Injectable 5000 Unit(s) SubCutaneous every 8 hours  influenza   Vaccine 0.5 milliLiter(s) IntraMuscular once  insulin glargine Injectable (LANTUS) 15 Unit(s) SubCutaneous at bedtime  insulin lispro (HumaLOG) corrective regimen sliding scale   SubCutaneous three times a day before meals  insulin lispro Injectable (HumaLOG) 5 Unit(s) SubCutaneous three times a day before meals  isosorbide   mononitrate ER Tablet (IMDUR) 30 milliGRAM(s) Oral daily  levoFLOXacin  Tablet 500 milliGRAM(s) Oral every 48 hours  levothyroxine 50 MICROGram(s) Oral daily  methylPREDNISolone sodium succinate Injectable 60 milliGRAM(s) IV Push every 8 hours  pantoprazole    Tablet 40 milliGRAM(s) Oral before breakfast  sertraline 50 milliGRAM(s) Oral daily  sevelamer hydrochloride 800 milliGRAM(s) Oral every 8 hours  sodium bicarbonate 650 milliGRAM(s) Oral three times a day    PRN Meds  acetaminophen   Tablet .. 650 milliGRAM(s) Oral every 6 hours PRN  ALBUTerol/ipratropium for Nebulization 3 milliLiter(s) Nebulizer every 6 hours PRN  dextrose 40% Gel 15 Gram(s) Oral once PRN  glucagon  Injectable 1 milliGRAM(s) IntraMuscular once PRN  ondansetron Injectable 4 milliGRAM(s) IV Push every 6 hours PRN      Case discussed with resident    Care discussed with pt/family present

## 2023-09-12 NOTE — PROGRESS NOTE ADULT - SUBJECTIVE AND OBJECTIVE BOX
OVERNIGHT EVENTS: still wheezing, cough           Vital Signs Last 24 Hrs  T(C): 36.1 (29 Nov 2018 14:02), Max: 36.3 (29 Nov 2018 06:15)  T(F): 97 (29 Nov 2018 14:02), Max: 97.4 (29 Nov 2018 06:15)  HR: 69 (29 Nov 2018 14:02) (68 - 81)  BP: 174/82 (29 Nov 2018 14:02) (132/62 - 174/82)  RR: 18 (29 Nov 2018 14:02) (18 - 18)  SpO2: 97% (29 Nov 2018 10:59) (97% - 97%)    PHYSICAL EXAMINATION:    GENERAL: The patient is awake and alert in no apparent distress.     HEENT: Head is normocephalic and atraumatic. Extraocular muscles are intact. Mucous membranes are moist.    NECK: Supple.    LUNGS: diffuse wheezing    HEART: Regular rate and rhythm without murmur.    ABDOMEN: Soft, nontender, and nondistended.      EXTREMITIES: Without any cyanosis, clubbing, rash, lesions or edema.    NEUROLOGIC: Grossly intact.    SKIN: No ulceration or induration present.      LABS:                        8.4    13.53 )-----------( 148      ( 29 Nov 2018 08:03 )             27.6     11-29    140  |  104  |  105<HH>  ----------------------------<  248<H>  5.3<H>   |  21  |  5.5<HH>    Ca    9.1      29 Nov 2018 08:03  Phos  6.3     11-28  Mg     1.8     11-29    TPro  5.1<L>  /  Alb  2.6<L>  /  TBili  <0.2  /  DBili  x   /  AST  16  /  ALT  25  /  AlkPhos  117<H>  11-29 11-28-18 @ 07:01  -  11-29-18 @ 07:00  --------------------------------------------------------  IN: 720 mL / OUT: 600 mL / NET: 120 mL    11-29-18 @ 07:01  -  11-29-18 @ 18:43  --------------------------------------------------------  IN: 540 mL / OUT: 250 mL / NET: 290 mL        MICROBIOLOGY:  Culture Results:   No growth to date. (11-26 @ 09:30)  Culture Results:   No growth to date. (11-26 @ 01:00)      MEDICATIONS  (STANDING):  ALBUTerol/ipratropium for Nebulization 3 milliLiter(s) Nebulizer every 4 hours  aspirin enteric coated 81 milliGRAM(s) Oral daily  buDESOnide 160 MICROgram(s)/formoterol 4.5 MICROgram(s) Inhaler 2 Puff(s) Inhalation two times a day  carvedilol 12.5 milliGRAM(s) Oral every 12 hours  dextrose 5%. 1000 milliLiter(s) (50 mL/Hr) IV Continuous <Continuous>  dextrose 50% Injectable 12.5 Gram(s) IV Push once  dextrose 50% Injectable 25 Gram(s) IV Push once  dextrose 50% Injectable 25 Gram(s) IV Push once  furosemide    Tablet 20 milliGRAM(s) Oral daily  guaiFENesin  milliGRAM(s) Oral every 12 hours  heparin  Injectable 5000 Unit(s) SubCutaneous every 8 hours  influenza   Vaccine 0.5 milliLiter(s) IntraMuscular once  insulin glargine Injectable (LANTUS) 15 Unit(s) SubCutaneous at bedtime  insulin lispro (HumaLOG) corrective regimen sliding scale   SubCutaneous three times a day before meals  insulin lispro Injectable (HumaLOG) 5 Unit(s) SubCutaneous three times a day before meals  isosorbide   mononitrate ER Tablet (IMDUR) 30 milliGRAM(s) Oral daily  levoFLOXacin  Tablet 500 milliGRAM(s) Oral every 48 hours  levothyroxine 50 MICROGram(s) Oral daily  methylPREDNISolone sodium succinate Injectable 60 milliGRAM(s) IV Push every 8 hours  pantoprazole    Tablet 40 milliGRAM(s) Oral before breakfast  sertraline 50 milliGRAM(s) Oral daily  sevelamer hydrochloride 800 milliGRAM(s) Oral every 8 hours  sodium bicarbonate 650 milliGRAM(s) Oral three times a day    MEDICATIONS  (PRN):  acetaminophen   Tablet .. 650 milliGRAM(s) Oral every 6 hours PRN Moderate Pain (4 - 6)  dextrose 40% Gel 15 Gram(s) Oral once PRN Blood Glucose LESS THAN 70 milliGRAM(s)/deciliter  glucagon  Injectable 1 milliGRAM(s) IntraMuscular once PRN Glucose LESS THAN 70 milligrams/deciliter  ondansetron Injectable 4 milliGRAM(s) IV Push every 6 hours PRN Nausea and/or Vomiting      RADIOLOGY & ADDITIONAL STUDIES: Drysol Pregnancy And Lactation Text: This medication is considered safe during pregnancy and breast feeding.